# Patient Record
Sex: FEMALE | Race: WHITE | Employment: OTHER | URBAN - METROPOLITAN AREA
[De-identification: names, ages, dates, MRNs, and addresses within clinical notes are randomized per-mention and may not be internally consistent; named-entity substitution may affect disease eponyms.]

---

## 2017-01-18 ENCOUNTER — HOSPITAL ENCOUNTER (OUTPATIENT)
Dept: PHYSICAL THERAPY | Age: 82
Discharge: HOME | End: 2017-01-18
Payer: MEDICARE

## 2017-01-18 PROCEDURE — G8979 MOBILITY GOAL STATUS: HCPCS

## 2017-01-18 PROCEDURE — G8978 MOBILITY CURRENT STATUS: HCPCS

## 2017-01-18 PROCEDURE — 97163 PT EVAL HIGH COMPLEX 45 MIN: CPT

## 2017-01-18 NOTE — PROGRESS NOTES
[facility]  [facility], [address]  Ph [phone]  Fx [phone]    Plan of Care/ Statement of Necessity for Physical Therapy Services    Patient name: David Hancock Start of Care: 2017   Referral source: Homero Germain MD : 1925    Medical Diagnosis: Weakness [R53.1], balance problems Onset Date: 2016   Treatment Diagnosis:  Significant fall risk, possible vestibular problems, generalized weakness, gait instability   Prior Hospitalization: see medical history Provider#: [id number]   Medications: Verified on Patient summary List    Comorbidities: UTI, HTN, visual impairment, hearing impairment, fall risk   Prior Level of Function: ambulating with rollator, pt requires assistance with ADLs except bathing and dressing       The Plan of Care and following information is based on the information from the initial evaluation. Assessment/ key information:    Pt's  reported that pt was hospitalized in 2016 for 4 days then entered rehab facility for 1 month after UTI. Pt reports weakness and unsteady gait, but also reports dizziness and vertigo-like symptoms. During exam today, pt displayed significant fall risk using rollator (Functional Gait Assessment (FGA): 10/30); strength of LEs grossly 4-/5 to 5/5 bilateral LEs. Pt is able to transfer and mobilize on treatment table independently. Pt has reported vertigo-like symptoms in the past several weeks and also reported mild vertigo during head turns while performing FGA. Pt would benefit from a PT assessment with a vestibular trained physical therapist to assess and treat if necessary. Please indicate below order for vestibular evaluation and treatment if you agree and pt will be transferred to the [address] [place] [address] clinic to be evaluated and receive treatment.     Evaluation Complexity History HIGH Complexity :3+ comorbidities / personal factors will impact the outcome/ POC ; Examination MEDIUM Complexity : 3 Standardized tests and measures addressing body structure, function, activity limitation and / or participation in recreation  ;Presentation MEDIUM Complexity : Evolving with changing characteristics  ; Clinical Decision Making Other outcome measures FGA  HIGH   Overall Complexity Rating: HIGH   Problem List: decrease strength, impaired gait/ balance, decrease ADL/ functional abilitiies, decrease activity tolerance, decrease flexibility/ joint mobility and decrease transfer abilities   Treatment Plan may include any combination of the following: Therapeutic exercise, Therapeutic activities, Neuromuscular re-education, Gait/balance training and Other: vestibular evaluation and treatment  Patient / Family readiness to learn indicated by: asking questions, trying to perform skills and interest  Persons(s) to be included in education: patient (P) and family support person (FSP);list   Barriers to Learning/Limitations: None  Patient Goal (s): \"I want to be able to walk and function without fear of falling. \"  Patient Self Reported Health Status: good  Rehabilitation Potential: good    hort Term Goals (To be achieved in 4 weeks)   1) Increase strength of bilateral LEs by 1/3 grade in order to improve ability to transfer from various surfaces to standing without assistance. Status at initial evaluation: strength of LEs grossly 4-/5 to 5/5 bilateral LEs   2) Pt will be independent and compliant with HEP to achieve other goals. Status at initial evaluation: pt is not independent with exercises. 3) Pt will undergo PT vestibular evaluation in order to determine need for vestibular treatment and vestibular treatments will be initiated based on clinical findings and assessment. Status at initial evaluation: awaiting doctor's order for evaluation and treatment.      Long Term Goals (To be achieved in 8 weeks)   1) Pt will be able to demonstrate ability to ambulate with least restrictive assistive device x 200 feet with SBA or better in order to mobilize more independently. Status at initial evaluation: pt ambulating with rollator with SBA   2) Pt will increase strength of bilateral LEs to 4+/5 or better in order to normalize function, climb stairs, and allow pt to independently transfer from various surfaces to standing without assistance. Status at initial evaluation: strength of LEs grossly 4-/5 to 5/5 bilateral LEs   3) Pt will be able to independently climb stairs x14 steps with reciprocal gait pattern with 1-2 hand rails without safety concerns in order to normalize mobility. Status at initial evaluation: pt ambulating with inconsistent reciprocal stepping pattern using 2 hand rails    4) FGA score will increase 4 points in order to indicate significant improvement in dynamic balance and decrease risk of falls. Status at initial evaluation: FGA: 10/30    Frequency / Duration: Patient to be seen 2 times per week for 8 weeks. Patient/ Caregiver education and instruction: Diagnosis, prognosis, self care and activity modification, plan of care   [x]  Plan of care has been reviewed with PTA    G-Codes (GP)  Mobility   Current  CL= 60-79%   Goal  CK= 40-59%  The severity rating is based on clinical judgment and the FGA score. Certification Period: 1/18/17 to 3/18/17  James He PT 1/18/2017 10:43 AM    ________________________________________________________________________    I certify that the above Therapy Services are being furnished while the patient is under my care. I agree with the treatment plan and certify that this therapy is necessary.     Physician's Signature:____________________  Date:____________Time: _________    Please sign and return to [facility]  2 Hasmukh Baez [address], [address] [facility]  Ph [phone]  Fx [phone]

## 2017-01-18 NOTE — PROGRESS NOTES
PT DAILY TREATMENT NOTE - [facility]     Patient Name: Nissa Escalante  Date:2017  : 1925  [x]  Patient  Verified  Payor: [place] MEDICARE / Plan: [place] MEDICARE PART A & B / Product Type: Medicare /    In time:10:20  Out time:11:12  Total Treatment Time (min): 52  Total Timed Codes (min): 0  1:1 Treatment Time ( [address] only): 46   Visit #: 1 of 16    Treatment Area: Weakness [R53.1]    SUBJECTIVE  Pain Level (0-10 scale): 0/10  Any medication changes, allergies to medications, adverse drug reactions, diagnosis change, or new procedure performed?: [x] No    [] Yes (see summary sheet for update)  Subjective functional status/changes:   [] No changes reported  1 year ago vertigo-like symptoms, unsteadiness; 2016 hospitalized for 4 days due to UTI then in rehab facility for 4 weeks   Denies any recent falls, but pt very cautious, denies LEs buckling  Hx: history of UTIs   Job: sedentary lifestyle due to imbalance,  drives, pt dresses and bathes self, but other ADLs provided by . Goals: \"I want to be able to walk and function without fear of falling. \"       OBJECTIVE    Modality rationale:    Min Type Additional Details    [] Estim:  []Unatt       []IFC  []Premod                        []Other:  []w/ice   []w/heat  Position:  Location:    [] Estim: []Att    []TENS instruct  []NMES                    []Other:  []w/US   []w/ice   []w/heat  Position:  Location:    []  Traction: [] Cervical       []Lumbar                       [] Prone          []Supine                       []Intermittent   []Continuous Lbs:  [] before manual  [] after manual    []  Ultrasound: []Continuous   [] Pulsed                           []1MHz   []3MHz W/cm2:  Location:    []  Iontophoresis with dexamethasone         Location: [] Take home patch   [] In clinic    []  Ice     []  heat  []  Ice massage  []  Laser   []  Anodyne Position:  Location:    []  Laser with stim  []  Other:  Position:  Location:    []  Vasopneumatic Device Pressure:       [] lo [] med [] hi   Temperature: [] lo [] med [] hi   [] Skin assessment post-treatment:  []intact []redness- no adverse reaction    []redness  adverse reaction:     52 min [x]Eval                  []Re-Eval        min Therapeutic Exercise:  [] See flow sheet :        min Therapeutic Activity:  []  See flow sheet :         min Neuromuscular Re-education:  []  See flow sheet :        min Manual Therapy:          min Gait Training:  ___ feet with ___ device on level surfaces with ___ level of assist   Rationale: With   [] TE   [] TA   [] neuro   [] other: Patient Education: [x] Review HEP    [] Progressed/Changed HEP based on:   [] positioning   [] body mechanics   [] transfers   [] heat/ice application    [] other:      Other Objective/Functional Measures:   See evaluation and plan of care. Pain Level (0-10 scale) post treatment: 0/10    ASSESSMENT/Changes in Function:    Pt's  reported that pt was hospitalized in November 2016 for 4 days then entered rehab facility for 1 month after UTI. Pt reports weakness and unsteady gait, but also reports dizziness and vertigo-like symptoms. During exam today, pt displayed significant fall risk using rollator (Functional Gait Assessment (FGA): 10/30); strength of LEs grossly 4-/5 to 5/5 bilateral LEs. Pt is able to transfer and mobilize on treatment table independently. Pt has reported vertigo-like symptoms in the past several weeks and also reported mild vertigo during head turns while performing FGA. Pt would benefit from a PT assessment with a vestibular trained physical therapist to assess and treat if necessary. Please indicate below order for vestibular evaluation and treatment if you agree and pt will be transferred to the [address] [place] [address] clinic to be evaluated and receive treatment.     Patient will continue to benefit from skilled PT services to modify and progress therapeutic interventions, address functional mobility deficits, address strength deficits, analyze and cue movement patterns, analyze and modify body mechanics/ergonomics, assess and modify postural abnormalities, address imbalance/dizziness and instruct in home and community integration to attain remaining goals. []  See Plan of Care  []  See progress note/recertification  []  See Discharge Summary         Progress towards goals / Updated goals:  Short Term Goals (To be achieved in 4 weeks)   1) Increase strength of bilateral LEs by 1/3 grade in order to improve ability to transfer from various surfaces to standing without assistance. Status at initial evaluation: strength of LEs grossly 4-/5 to 5/5 bilateral LEs  Current status: not reassessed     2) Pt will be independent and compliant with HEP to achieve other goals. Status at initial evaluation: pt is not independent with exercises. Current status: not reassessed     3) Pt will undergo PT vestibular evaluation in order to determine need for vestibular treatment and vestibular treatments will be initiated based on clinical findings and assessment. Status at initial evaluation: awaiting doctor's order for evaluation and treatment. Current status: not reassessed     Long Term Goals (To be achieved in 8 weeks)   1) Pt will be able to demonstrate ability to ambulate with least restrictive assistive device x 200 feet with SBA or better in order to mobilize more independently. Status at initial evaluation: pt ambulating with rollator with SBA  Current status: not reassessed     2) Pt will increase strength of bilateral LEs to 4+/5 or better in order to normalize function, climb stairs, and allow pt to independently transfer from various surfaces to standing without assistance.   Status at initial evaluation: strength of LEs grossly 4-/5 to 5/5 bilateral LEs  Current status: not reassessed     3) Pt will be able to independently climb stairs x14 steps with reciprocal gait pattern with 1-2 hand rails without safety concerns in order to normalize mobility. Status at initial evaluation: pt ambulating with inconsistent reciprocal stepping pattern using 2 hand rails   Current status: not reassessed     4) FGA score will increase 4 points in order to indicate significant improvement in dynamic balance and decrease risk of falls. Status at initial evaluation: FGA: 10/30  Current status: not reassessed    PLAN  []  Upgrade activities as tolerated     []  Continue plan of care  []  Update interventions per flow sheet       []  Discharge due to:_  [x]  Other: recommending vestibular evaluation and treatment, LE strengthening/conditioning, balance/gait training     Cash Motta, PT 1/18/2017  11:13 AM    No future appointments.

## 2017-01-18 NOTE — PROGRESS NOTES
Physical Therapy Evaluation  Neurologic  1 year ago vertigo-like symptoms, unsteadiness,   Denies any recent falls, but pt very cautious, denies LEs buckling  Hx: history of UTIs,   Job: sedentary lifestyle due to imbalance,  drives, pt dresses and bathes self, but other ADLs provided by . Goals: \"I want to be able to walk and function without fear of falling. \"    Posture: [] Poor    [] Fair    [] Good    Describe:       Gait: [] Normal    [] Abnormal    Device:      Describe:    ROM:                             AROM    PROM   Shoulder Left Right Left Right   Flex       Ext       ABD       ER       IR                AROM    PROM   Knee Left Right Left Right   Ext       Flex                 AROM                           PROM  Hip Left Right Left Right   Flex       Ext       ABD       ER       IR                                                AROM      PROM   Ankle Left Right Left Right   Ext       Flex         Strength (MMT):  Shoulder L (1-5) R (1-5)   Shoulder Flexion     Shoulder Ext     Shoulder ABD     Shoulder ADD     Shoulder IR     Shoulder ER                                               Hip L (1-5) R (1-5)   Hip Flexion 4- 4-   Hip Ext 5 5   Hip ABD 4 4   Hip ADD 5 5   Hip ER 4 4   Hip IR 4 4-     Knee L (1-5) R (1-5)   Knee Flexion 4 4-   Knee Extension 4+ 4+   Ankle PF     Ankle DF 5 5   Other       Tone:    Motor Control:    Sensation:    Reflexes: [] Not Tested   Left Right   Biceps (C5)     Brachioradiais (C6)     Triceps (C7)     Knee Jerk (L4)     Ankle Jerk (SI)       Balance/ Equilibrium:              Left            Right  Tracks Across Midline      Reaches Across Midline           Sitting Balance: Static:  [] Good    [] Fair    [] Poor     Dynamic:   [] Good    [] Fair    [] Poor        Standing Balance: Static:   [] Good    [] Fair    [] Poor     Dynamic:   [] Good    [] Fair    [] Poor        Protective Extension:  [] Present    [] Delayed    [] Absent        Single Leg Stance: Eyes Open  Eyes Closed   L  L    R  R        Functional Mobility      Bed Mobility:      Scooting:        Rolling:       Sit-Supine:      Transfers:       Sit-Stand:       Floor-Stand:       Gait:       Tandem:       Backwards:       Braiding:      Elevations:       Curbs:      Ramps:      Stairs:    Behavior: [] Cooperative    [] Impulsive    [] Agitated    [] Perseverative    [] Confused   Oriented x:    Cognition: [] One Step Commands   [] Multiple Commands   [] Displays Neglect [] R  [] L    Other:       Impaired Judgement: [] Y    [] N      Impaired Vision:  [] Y    [] N      Safety Awareness Deficits  [] Y    [] N      Impaired Hearing  [] Y    [] N      Able to Express Needs [] Y    [] N    Optional Tests:       Dynamic Gait Index (24pt scale): Functional Gait Assessment (30pt scale): 10/30       Pryor Balance Scale (56pt scale):     Other test /comments:

## 2017-01-24 ENCOUNTER — HOSPITAL ENCOUNTER (OUTPATIENT)
Dept: PHYSICAL THERAPY | Age: 82
Discharge: HOME | End: 2017-01-24
Payer: MEDICARE

## 2017-01-24 PROCEDURE — 97530 THERAPEUTIC ACTIVITIES: CPT

## 2017-01-24 PROCEDURE — 97110 THERAPEUTIC EXERCISES: CPT

## 2017-01-24 NOTE — PROGRESS NOTES
PT DAILY TREATMENT NOTE/VESTIBULAR EVAL 3-16    Patient Name: Nissa Escalante  Date:2017  : 1925  [x]  Patient  Verified  Payor: [place] MEDICARE / Plan: VA MEDICARE PART A & B / Product Type: Medicare /    In time:935  Out time:1030  Total Treatment Time (min): 55  Total Timed Codes (min): 55  1:1 Treatment Time ([facility] only): 54   Visit #: 2 of 16    Treatment Area: Dizziness [R42]  Weakness [R53.1]    SUBJECTIVE  Pain Level (0-10 scale): 2 CS  []constant [x]intermittent []improving []worsening []no change since onset    Any medication changes, allergies to medications, adverse drug reactions, diagnosis change, or new procedure performed?: [x] No    [] Yes (see summary sheet for update)  Subjective functional status/changes: hospitalization due to UTI, followed by rehab for 4 weeks and HH PT. Always spend the smith up in [place]. Ambulatory with RW for about 2 years. Fall over the last 2 months. Reports dizziness with head movements, getting up in AM. Has had symptoms for many years. Never been treated for dizziness. Uncertain of steps taking during episodes of dizziness. Uses night light . Some pain with neck movements.         OBJECTIVE/EXAMINATION              30 min Reevaluation         min Therapeutic Exercise:  [] See flow sheet :       25 min Therapeutic Activity:  [x]  See flow sheet :instruction in HEP and POC,balance activities    Rationale: increase ROM, increase strength and improve balance  to improve ambulatory balance      min Neuromuscular Re-education:  []  See flow sheet :                 With   [] TE   [x] TA   [] neuro   [] other: Patient Education: [x] Review HEP    [] Progressed/Changed HEP based on:   [] positioning   [] body mechanics   [] transfers   [] heat/ice application    [] other:      Other Objective/Functional Measures: as per evaluation    Physical Therapy Evaluation - Vestibular    Posture:  [] WNL      [x] Forward head    [x] Protracted shoulders    [] Retracted shoulders  [] Kyphosis:  [] increased   [] decreased   [] Lordosis:   [] increased   [] decreased  Other:    C/S ROM: [] WFL    [x] Limited    Describe:SB 10, Ext 20, Flex 30, rot both 30 deg, increased pain with right rot. Pain with all AROM    Strength: [] WFL    [x] Limited    Describe:gross UE strength 4/5 MMT    Optional Tests:  Sensation:  [x] Intact [x] Diminished    Describe:occasional numbness in both hands due to cold    Proprioception: [x] Intact [] Diminished    Describe:    Coordination Testing:       Disdiadochokinesia [x] WFL    [] Impaired    Describe:       Heel - Apple  [] [facility]    [] Impaired    Describe:       FNF   [] WFL    [] Impaired    Describe: Toe Tap   [x] [facility]    [] Impaired    Describe:    Oculomotor Tests: (Fixation Not Blocked)       Ocular ROM:   [x] WFL    [] Limited    Describe:       Spontaneous Nystag. [x] Neg     [] Pos    [] Left    [] Right       Gaze Holding Nystag. [x] Neg     [] Pos    [] Left    [] Right        Smooth Pursuit  [x] Neg     [] Pos    [] Left    [] Right        Saccades   [x] Neg     [] Pos    [] Left    [] Right        VOR - Slow Head Mvmt [x] Neg     [] Pos    [] Left    [] Right        VOR - Fast Head Mvmt [x] Neg     [] Pos    [] Left    [] Right        Head Thrust  [x] Neg     [] Pos    [] Left    [] Right        Static Visual Acuity [x] Neg     [] Pos    [] Left    [] Right        Dynamic Visual Acuity [x] Neg     [] Pos    [] Left    [] Right     Oculomotor Tests: (Fixation Blocked)       Spontaneous Nystag. [x] Neg     [] Pos    [] Left    [] Right       Gaze Holding Nystag. [x] Neg     [] Pos    [] Left    [] Right        Head-Shaking Nystag.  [x] Neg     [] Pos    [] Left    [] Right    Other Special Tests:       Vertebral Artery Testing [] Neg     [] Pos    [] Left    [] Right       Hallpike-Street Maneuver [x] Neg     [] Pos    [x] Left    [x] Right       Roll Test   [] Neg     [] Pos    [] Left    [] Right       Pryor Balance Scale [] Neg     [] Pos Score:       Dynamic Gait Index [] Neg     [] Pos    Score:       Functional Gait Assess. [] Neg     [x] Pos    Score:10/30    Balance Standard Testing (Eyes Open/Eyes Closed - EO/EC)       Romberg   [x] [facility]    [] Pos    Describe: Increased sway with EC but able to maintain standing for 10 seconds, feet together/EC for max 3 seconds, uneasy with test and foot position          Stand on Foam  [] WFL    [] Pos    Describe:        Standing on Rail  [] WFL    [x] Pos    Describe: Unable to perform       Sharpened Romberg [] WFL    [x] Pos    Describe: Needs assist to maintain position with EO       Single Leg Stand  [] [facility]    [x] Pos    Describe:unable to perform > 1 second     Motion Sensitivity:  [] Neg     [] Pos    Describe:    Computerized Dynamic Posturography:        [x] Not Tested    [] WFL    Score: Other Tests:         Pain Level (0-10 scale) post treatment: 0    ASSESSMENT/Changes in Function: understanding of POC    Patient will continue to benefit from skilled PT services to address ROM deficits, address strength deficits, analyze and cue movement patterns and address imbalance/dizziness to attain remaining goals. []  See Plan of Care  []  See progress note/recertification  []  See Discharge Summary         Progress towards goals / Updated goals:  Good tolerance to activities, no reports of dizziness     hort Term Goals (To be achieved in 4 weeks)  1) Increase strength of bilateral LEs by 1/3 grade in order to improve ability to transfer from various surfaces to standing without assistance. Status at initial evaluation: strength of LEs grossly 4-/5 to 5/5 bilateral LEs  2) Pt will be independent and compliant with HEP to achieve other goals. Status at initial evaluation: pt is not independent with exercises.   3)Improved DHI score to ,or= 30 points as evidence of improved dizziness with ADL  Status at [facility]: [address] [place] [address] Term Goals (To be achieved in 8 weeks)  1) Pt will be able to demonstrate ability to ambulate with least restrictive assistive device x 200 feet with SBA or better in order to mobilize more independently. Status at initial evaluation: pt ambulating with rollator with SBA  2) Pt will increase strength of bilateral LEs to 4+/5 or better in order to normalize function, climb stairs, and allow pt to independently transfer from various surfaces to standing without assistance. Status at initial evaluation: strength of LEs grossly 4-/5 to 5/5 bilateral LEs  3) Pt will be able to independently climb stairs x14 steps with reciprocal gait pattern with 1-2 hand rails without safety concerns in order to normalize mobility. Status at initial evaluation: pt ambulating with inconsistent reciprocal stepping pattern using 2 hand rails   4) FGA score will increase 4 points in order to indicate significant improvement in dynamic balance and decrease risk of falls.   Status at initial evaluation: FGA: 10/30     PLAN  []  Upgrade activities as tolerated     [x]  Continue plan of care  []  Update interventions per flow sheet       []  Discharge due to:_  []  Other:_      Shweta Bush PT 1/24/2017  9:35 AM

## 2017-01-26 ENCOUNTER — HOSPITAL ENCOUNTER (OUTPATIENT)
Dept: PHYSICAL THERAPY | Age: 82
Discharge: HOME | End: 2017-01-26
Payer: MEDICARE

## 2017-01-26 PROCEDURE — 97110 THERAPEUTIC EXERCISES: CPT

## 2017-01-26 PROCEDURE — 97530 THERAPEUTIC ACTIVITIES: CPT

## 2017-01-26 PROCEDURE — 97140 MANUAL THERAPY 1/> REGIONS: CPT

## 2017-01-26 NOTE — PROGRESS NOTES
PT DAILY TREATMENT NOTE - [facility]     Patient Name: Evaristo Young  Date:2017  : 1925  [x]  Patient  Verified  Payor: [place] MEDICARE / Plan: [place] MEDICARE PART A & B / Product Type: Medicare /    In time:5  Out time:11  Total Treatment Time (min): 55  Total Timed Codes (min): 45  1:1 Treatment Time ( [address] only): 39   Visit #: 3 of 16    Treatment Area: Dizziness [R42]  Weakness [R53.1]    SUBJECTIVE  Pain Level (0-10 scale): 0  Any medication changes, allergies to medications, adverse drug reactions, diagnosis change, or new procedure performed?: [x] No    [] Yes (see summary sheet for update)  Subjective functional status/changes:   [x] No changes reported  Some neck pain with movements, dizziness with HEP    OBJECTIVE    Modality rationale: Pain control   Min Type Additional Details    [] Estim:  []Unatt       []IFC  []Premod                        []Other:  []w/ice   []w/heat  Position:  Location:    [] Estim: []Att    []TENS instruct  []NMES                    []Other:  []w/US   []w/ice   []w/heat  Position:  Location:    []  Traction: [] Cervical       []Lumbar                       [] Prone          []Supine                       []Intermittent   []Continuous Lbs:  [] before manual  [] after manual    []  Ultrasound: []Continuous   [] Pulsed                           []1MHz   []3MHz W/cm2:  Location:    []  Iontophoresis with dexamethasone         Location: [] Take home patch   [] In clinic   10 []  Ice     [x]  heat  []  Ice massage  []  Laser   []  Anodyne Position:supine 90/90  Location:CS    []  Laser with stim  []  Other:  Position:  Location:    []  Vasopneumatic Device Pressure:       [] lo [] med [] hi   Temperature: [] lo [] med [] hi   [] Skin assessment post-treatment:  []intact []redness- no adverse reaction    []redness  adverse reaction:      min []Eval                  []Re-Eval       15 min Therapeutic Exercise:  [x] See flow sheet :   Rationale: increase ROM, increase strength and improve balance to improve the patients ability to return to PLOF    15 min Therapeutic Activity:  [x]  See flow sheet :   Rationale: increase ROM, increase strength and improve balance  to improve the patients ability to return to PLOF      min Neuromuscular Re-education:  []  See flow sheet :   Rationale:   to improve the patients ability to     15 min Manual Therapy:  Functional massage and gentle facet gliding to CS in supine for increased mobility and ease to perform vestibular ex   Rationale: decrease pain, increase ROM, increase tissue extensibility, correct positional vertigo and increase postural awareness      min Gait Training:  ___ feet with ___ device on level surfaces with ___ level of assist   Rationale: With   [] TE   [] TA   [] neuro   [] other: Patient Education: [x] Review HEP    [] Progressed/Changed HEP based on:   [] positioning   [] body mechanics   [] transfers   [] heat/ice application    [] other:      Other Objective/Functional Measures: marked stiffness and limited mobility ,in CS,   Deficit in gaze stability, eyes drifting occasionally with VSE ex         Pain Level (0-10 scale) post treatment: 0    ASSESSMENT/Changes in Function: good tolerance to MT    Patient will continue to benefit from skilled PT services to address functional mobility deficits, address ROM deficits, address strength deficits, analyze and address soft tissue restrictions, analyze and cue movement patterns and analyze and modify body mechanics/ergonomics to attain remaining goals.      [x]  See Plan of Care  []  See progress note/recertification  []  See Discharge Summary         Progress towards goals / Updated goals:  Reduction in pain    PLAN  []  Upgrade activities as tolerated     [x]  Continue plan of care  []  Update interventions per flow sheet       []  Discharge due to:_  []  Other:_      Joy Adams, PT 1/26/2017  10:07 AM    Future Appointments  Date Time Provider Department [place]   1/31/2017 9:00 AM ASHU Alvarado THE [facility]   2/2/2017 9:30 AM ASHU Alvarado THE [facility]

## 2017-01-31 ENCOUNTER — APPOINTMENT (OUTPATIENT)
Dept: PHYSICAL THERAPY | Age: 82
End: 2017-01-31
Payer: MEDICARE

## 2017-02-01 ENCOUNTER — HOSPITAL ENCOUNTER (OUTPATIENT)
Dept: PHYSICAL THERAPY | Age: 82
Discharge: HOME | End: 2017-02-01
Payer: MEDICARE

## 2017-02-01 PROCEDURE — 97530 THERAPEUTIC ACTIVITIES: CPT

## 2017-02-01 NOTE — PROGRESS NOTES
PT DAILY TREATMENT NOTE - [facility]     Patient Name: Josh Urban  Date:2017  : 1925  [x]  Patient  Verified  Payor: [place] MEDICARE / Plan: [place] MEDICARE PART A & B / Product Type: Medicare /    In time:200  Out time:245  Total Treatment Time (min): 45  Total Timed Codes (min): 45  1:1 Treatment Time ( [address] only): 39   Visit #: 4 of 16    Treatment Area: Dizziness [R42]  Weakness [R53.1]    SUBJECTIVE  Pain Level (0-10 scale): 0  Any medication changes, allergies to medications, adverse drug reactions, diagnosis change, or new procedure performed?: [x] No    [] Yes (see summary sheet for update)  Subjective functional status/changes:   [] No changes reported  Reports some improvement with strength, has been active with vestibular eye exercises. OBJECTIVE    45 min Therapeutic Activity:  [x]  See flow sheet : standing pertubation's in all directions in standing    Rationale: increase ROM, increase strength and improve coordination            With   [] TE   [] TA   [] neuro   [] other: Patient Education: [x] Review HEP    [] Progressed/Changed HEP based on:   [] positioning   [] body mechanics   [] transfers   [] heat/ice application    [] other:      Other Objective/Functional Measures:    Trouble focusing on object with vertical head movements       Pain Level (0-10 scale) post treatment: 0    ASSESSMENT/Changes in Function:   Some difficulty change direction against pertubation's, required mod verbal cueing to shift weight in proper direction. Patient will continue to benefit from skilled PT services to modify and progress therapeutic interventions, address functional mobility deficits, address ROM deficits and address strength deficits to attain remaining goals.      [x]  See Plan of Care  []  See progress note/recertification  []  See Discharge Summary         Progress towards goals / Updated goals:  short Term Goals (To be achieved in 4 weeks)  1) Increase strength of bilateral LEs by 1/3 grade in order to improve ability to transfer from various surfaces to standing without assistance. Status at initial evaluation: strength of LEs grossly 4-/5 to 5/5 bilateral LEs  2) Pt will be independent and compliant with HEP to achieve other goals. Status at initial evaluation: pt is not independent with exercises. 3)Improved DHI score to ,or= 30 points as evidence of improved dizziness with ADL  Status at [facility]: [facility]  Long Term Goals (To be achieved in 8 weeks)  1) Pt will be able to demonstrate ability to ambulate with least restrictive assistive device x 200 feet with SBA or better in order to mobilize more independently. Status at initial evaluation: pt ambulating with rollator with SBA  2) Pt will increase strength of bilateral LEs to 4+/5 or better in order to normalize function, climb stairs, and allow pt to independently transfer from various surfaces to standing without assistance. Status at initial evaluation: strength of LEs grossly 4-/5 to 5/5 bilateral LEs  3) Pt will be able to independently climb stairs x14 steps with reciprocal gait pattern with 1-2 hand rails without safety concerns in order to normalize mobility. Status at initial evaluation: pt ambulating with inconsistent reciprocal stepping pattern using 2 hand rails   4) FGA score will increase 4 points in order to indicate significant improvement in dynamic balance and decrease risk of falls.   Status at initial evaluation: FGA: 10/30       PLAN  [x]  Upgrade activities as tolerated     [x]  Continue plan of care  []  Update interventions per flow sheet       []  Discharge due to:_  []  Other:_      Ulises [place] PTA 2/1/2017  2:55 PM    Future Appointments  Date Time Provider [place]   2/2/2017 9:30 AM ASHU Velazquez THE [facility]   2/6/2017 10:00 AM ASHU Velazquez [facility]   2/9/2017 11:00 AM DARYL Quispe THE [facility]

## 2017-02-02 ENCOUNTER — HOSPITAL ENCOUNTER (OUTPATIENT)
Dept: PHYSICAL THERAPY | Age: 82
Discharge: HOME | End: 2017-02-02
Payer: MEDICARE

## 2017-02-02 PROCEDURE — 97110 THERAPEUTIC EXERCISES: CPT

## 2017-02-02 PROCEDURE — 97116 GAIT TRAINING THERAPY: CPT

## 2017-02-02 NOTE — PROGRESS NOTES
PT DAILY TREATMENT NOTE - [facility]     Patient Name: Dario Shoemaker  Date:2017  : 1925  [x]  Patient  Verified  Payor: [place] MEDICARE / Plan: [place] MEDICARE PART A & B / Product Type: Medicare /    In time:945  Out time:1030  Total Treatment Time (min): 45  Total Timed Codes (min): 45  1:1 Treatment Time ([address] only): 39   Visit #: 5 of 16    Treatment Area: Dizziness [R42]  Weakness [R53.1]    SUBJECTIVE  Pain Level (0-10 scale): 0  Any medication changes, allergies to medications, adverse drug reactions, diagnosis change, or new procedure performed?: [x] No    [] Yes (see summary sheet for update)  Subjective functional status/changes:     [x] No changes reported      OBJECTIVE        35 min Therapeutic Exercise:  [] See flow sheet :   Rationale: increase ROM, increase strength and improve coordination    10 min Gait Training: focus on Weight shifting and equal stride length with SPC   Rationale: With   [x] TE   [] TA   [] neuro   [] other: Patient Education: [x] Review HEP  EC arms crossed balance  [] Progressed/Changed HEP based on:   [] positioning   [] body mechanics   [] transfers   [] heat/ice application    [] other:      Other Objective/Functional Measures:   Difficulty weight shifting while using SPC, improved with tactile cuing     Pain Level (0-10 scale) post treatment: 0    ASSESSMENT/Changes in Function:   Go understanding of HEP upon review. Min LOB with TE requiring external support. Improved gait pattern with spc with tactile and verbal cuing    Patient will continue to benefit from skilled PT services to modify and progress therapeutic interventions, address functional mobility deficits, address ROM deficits, address strength deficits and analyze and address soft tissue restrictions to attain remaining goals.      [x]  See Plan of Care  []  See progress note/recertification  []  See Discharge Summary         Progress towards goals / Updated goals:  short Term Goals (To be achieved in 4 weeks)  1) Increase strength of bilateral LEs by 1/3 grade in order to improve ability to transfer from various surfaces to standing without assistance. Status at initial evaluation: strength of LEs grossly 4-/5 to 5/5 bilateral LEs    2) Pt will be independent and compliant with HEP to achieve other goals. Status at initial evaluation: pt is not independent with exercises. 3)Improved DHI score to ,or= 30 points as evidence of improved dizziness with ADL  Status at [facility]: [facility]  Long Term Goals (To be achieved in 8 weeks)  1) Pt will be able to demonstrate ability to ambulate with least restrictive assistive device x 200 feet with SBA or better in order to mobilize more independently. Status at initial evaluation: pt ambulating with rollator with SBA    2) Pt will increase strength of bilateral LEs to 4+/5 or better in order to normalize function, climb stairs, and allow pt to independently transfer from various surfaces to standing without assistance. Status at initial evaluation: strength of LEs grossly 4-/5 to 5/5 bilateral LEs    3) Pt will be able to independently climb stairs x14 steps with reciprocal gait pattern with 1-2 hand rails without safety concerns in order to normalize mobility. Status at initial evaluation: pt ambulating with inconsistent reciprocal stepping pattern using 2 hand rails     4) FGA score will increase 4 points in order to indicate significant improvement in dynamic balance and decrease risk of falls.   Status at initial evaluation: FGA: 10/30          PLAN  [x]  Upgrade activities as tolerated     [x]  Continue plan of care  []  Update interventions per flow sheet       []  Discharge due to:_  []  Other:_      Roberth Mae PTA 2/2/2017  10:38 AM    Future Appointments  Date Time Provider [place]   2/6/2017 10:00 AM ASHU Muse THE [facility]   2/9/2017 11:00 AM DARYL Ortiz THE [facility]

## 2017-02-08 ENCOUNTER — APPOINTMENT (OUTPATIENT)
Dept: PHYSICAL THERAPY | Age: 82
End: 2017-02-08
Payer: MEDICARE

## 2017-02-09 ENCOUNTER — APPOINTMENT (OUTPATIENT)
Dept: PHYSICAL THERAPY | Age: 82
End: 2017-02-09
Payer: MEDICARE

## 2017-02-14 ENCOUNTER — APPOINTMENT (OUTPATIENT)
Dept: PHYSICAL THERAPY | Age: 82
End: 2017-02-14
Payer: MEDICARE

## 2017-02-17 ENCOUNTER — APPOINTMENT (OUTPATIENT)
Dept: PHYSICAL THERAPY | Age: 82
End: 2017-02-17
Payer: MEDICARE

## 2017-02-21 ENCOUNTER — HOSPITAL ENCOUNTER (OUTPATIENT)
Dept: PHYSICAL THERAPY | Age: 82
Discharge: HOME | End: 2017-02-21
Payer: MEDICARE

## 2017-02-21 PROCEDURE — G8979 MOBILITY GOAL STATUS: HCPCS

## 2017-02-21 PROCEDURE — 97530 THERAPEUTIC ACTIVITIES: CPT

## 2017-02-21 PROCEDURE — G8978 MOBILITY CURRENT STATUS: HCPCS

## 2017-02-21 PROCEDURE — 97140 MANUAL THERAPY 1/> REGIONS: CPT

## 2017-02-21 NOTE — PROGRESS NOTES
[facility] [address], [address] [place] [address]  Phone: [phone]      Fax:  [phone]    Medicare Progress Report      Patient name: Joan Tsai     Start of Care: 17  Referral source: Yolanda Bennett MD    : 1925  Medical/Treatment Diagnosis: Dizziness [R42]  Weakness [R53.1]  Onset Date:2016  Prior Hospitalization: see medical history   Provider#: [id number]  Comorbidities: UTI, HTN, visual impairment, hearing impairment, fall risk  Prior Level of Function:ambulation with rollator, requires assistance with ADLs except bathing and dressing  Medications: Verified on Patient Summary List    Visits from Start of Care: ambulating with rollator, requires assistance with ADLs except bathing and dressing    Missed Visits: 4  Reporting Period : 17 to 17    Subjective Reports: I am feeling stronger and my hip is not hurting as much any more                            Progress towards goals / Mary Kate Galeas Mrs. Jordon Will is showing slow progress in ambulatory balance, dizziness and general strength. She reports some improvement in subjective dizziness and ambulatory balance. Due to recent episode of acute hip pain she has been missing almost 2 weeks of therapy but she is ready to attend on a regular basis now since her hip pain has improved. She has met 1 of 7 goals. Recommend to continue with present treatment. Short Term Goals (To be achieved in 4 weeks)  1) Increase strength of bilateral LEs by 1/3 grade in order to improve ability to transfer from various surfaces to standing without assistance. Status at initial evaluation: strength of LEs grossly 4-/5 to 5/5 bilateral LEs  Current status: residual LE weakness (4-/5) and difficulty performing sit to stand without use of hands, progressing slowly    2) Pt will be independent and compliant with HEP to achieve other goals.   Status at initial evaluation: pt is not independent with exercises. Current status: daily compliance with HEP, goal met    3)Improved DHI score to ,or= 30 points as evidence of improved dizziness with ADL  Status at [facility]: [address] 56/100  Current status: reports 5% subjective improvement, DHI improved to 52 points, progressing slowly      Long Term Goals (To be achieved in 8 weeks)  1) Pt will be able to demonstrate ability to ambulate with least restrictive assistive device x 200 feet with SBA or better in order to mobilize more independently. Status at initial evaluation: pt ambulating with rollator with SBA  Current status: good endurance and ability to ambulate > 20 feet with CGA or rollator, reports fear of falling, unable to ambulate without assist, reports wall walking at home, progressing    2) Pt will increase strength of bilateral LEs to 4+/5 or better in order to normalize function, climb stairs, and allow pt to independently transfer from various surfaces to standing without assistance. Status at initial evaluation: strength of LEs grossly 4-/5 to 5/5 bilateral LEs  Current status: increased ease with ADL and ambulation, LE strength progressing slowly, gross strength 4-/5, progressing    3) Pt will be able to independently climb stairs x14 steps with reciprocal gait pattern with 1-2 hand rails without safety concerns in order to normalize mobility. Status at initial evaluation: pt ambulating with inconsistent reciprocal stepping pattern using 2 hand rails   Current status: able to perform stair ambulation/14 steps  with verbal cues with reciprocal gait pattern, using both HR, requires SBA for safety, progressing    4) FGA score will increase 4 points in order to indicate significant improvement in dynamic balance and decrease risk of falls.   Status at initial evaluation: FGA: 10/30  Current status: FGA unchanged, goal not met  Key functional changes: ambulatory balance      Problems/ barriers to goal attainment: weakness, chronic balance deficit     Assessment / Recommendations:continue with present treatment    Problem List: pain affecting function, decrease ROM, decrease strength, impaired gait/ balance, decrease ADL/ functional abilitiies, decrease activity tolerance, decrease flexibility/ joint mobility and decrease transfer abilities   Treatment Plan: Therapeutic exercise, Therapeutic activities, Neuromuscular re-education, Physical agent/modality, Gait/balance training, Manual therapy, Patient education and Other: vestibular ex        Frequency / Duration: Patient to be seen 2 times per week for 8 weeks as per initial POC:    G-Codes (GP)  Mobility  [id number] Current  CK= 40-59%   Goal  CJ= 20-39%  Position    Carry    Self Care      The severity rating is based on clinical judgement and the FOTO score.       Billie Kent, PT 2/21/2017 3:35 PM

## 2017-02-21 NOTE — PROGRESS NOTES
PT DAILY TREATMENT NOTE - [facility]     Patient Name: Tevin Moses  Date:2017  : 1925  [x]  Patient  Verified  Payor: [place] MEDICARE / Plan: VA MEDICARE PART A & B / Product Type: Medicare /    In time:11  Out time:1155  Total Treatment Time (min): 55  Total Timed Codes (min): 45  1:1 Treatment Time ( [address] only): 39   Visit #: 6 of 16    Treatment Area: Dizziness [R42]  Weakness [R53.1]    SUBJECTIVE  Pain Level (0-10 scale): 3 right hip  Any medication changes, allergies to medications, adverse drug reactions, diagnosis change, or new procedure performed?: [x] No    [] Yes (see summary sheet for update)  Subjective functional status/changes:     [] No changes reported  Patient reports that all of a sudden her right hip was hurting and she was barely able to get out of bed. Reports feeling better after taking prednisone. Last pill today. Mild residual pain in right buttock region. Due to hip pain she missed several visits. Ready to get back on track  Balance improving. Increased ease with ambulation. Using rollator at home and outside.       OBJECTIVE        30 min Therapeutic Activities:  [x] See flow sheet :reevaluation, static and dynamic balance activities, gait training   Rationale: increase ROM, increase strength and improve coordination    15 min Manual Therapy: including gentle CS distraction and suboccipital muscle release, gentle functional massage   Rationale:muscle relaxation and improved alignment to help with overall spinal alignment/posture during gait activities    10 min Moist Heat application to CS post therapy          With   [x] TE   [] TA   [] neuro   [] other: Patient Education: [x] Review HEP  EC arms crossed balance  [] Progressed/Changed HEP based on:   [] positioning   [] body mechanics   [] transfers   [] heat/ice application    [] other:      Other Objective/Functional Measures:   Needs HHA for ambulation without AD  Marked neck tightness and pain with AROM  Flexed posture with ambulation  FOTO 56/100 progressing  DHI improved from 56 to 52 points indicating minimal improvement in dizziness     Pain Level (0-10 scale) post treatment: 3 hip, 0 CS    ASSESSMENT/Changes in Function:   Patient motivated, mild pain in right hip after supine position    Patient will continue to benefit from skilled PT services to modify and progress therapeutic interventions, address functional mobility deficits, address ROM deficits, address strength deficits and analyze and address soft tissue restrictions to attain remaining goals. [x]  See Plan of Care  [x]  See progress note/recertification  []  See Discharge Summary         Progress towards goals / Roseanna Sanchez Mrs. Tobi Lakhani is showing slow progress in ambulatory balance, dizziness and general strength. She reports some improvement in subjective dizziness and ambulatory balance. Due to recent episode of acute hip pain she has been missing almost 2 weeks of therapy but she is ready to attend on a regular basis now since her hip pain has improved. She has met 1 of 7 goals. Recommend to continue with present treatment. Short Term Goals (To be achieved in 4 weeks)  1) Increase strength of bilateral LEs by 1/3 grade in order to improve ability to transfer from various surfaces to standing without assistance. Status at initial evaluation: strength of LEs grossly 4-/5 to 5/5 bilateral LEs  Current status: residual LE weakness (4-/5) and difficulty performing sit to stand without use of hands, progressing slowly    2) Pt will be independent and compliant with HEP to achieve other goals. Status at initial evaluation: pt is not independent with exercises.   Current status: daily compliance with HEP, goal met    3)Improved DHI score to ,or= 30 points as evidence of improved dizziness with ADL  Status at [facility]: [address] [place] [address] 56/100  Current status: reports 5% subjective improvement, DHI improved to 52 points, progressing slowly      Long Term Goals (To be achieved in 8 weeks)  1) Pt will be able to demonstrate ability to ambulate with least restrictive assistive device x 200 feet with SBA or better in order to mobilize more independently. Status at initial evaluation: pt ambulating with rollator with SBA  Current status: good endurance and ability to ambulate > 20 feet with CGA or rollator, reports fear of falling, unable to ambulate without assist, reports wall walking at home, progressing    2) Pt will increase strength of bilateral LEs to 4+/5 or better in order to normalize function, climb stairs, and allow pt to independently transfer from various surfaces to standing without assistance. Status at initial evaluation: strength of LEs grossly 4-/5 to 5/5 bilateral LEs  Current status: increased ease with ADL and ambulation, LE strength progressing slowly, gross strength 4-/5, progressing    3) Pt will be able to independently climb stairs x14 steps with reciprocal gait pattern with 1-2 hand rails without safety concerns in order to normalize mobility. Status at initial evaluation: pt ambulating with inconsistent reciprocal stepping pattern using 2 hand rails   Current status: able to perform stair ambulation/14 steps  with verbal cues with reciprocal gait pattern, using both HR, requires SBA for safety, progressing    4) FGA score will increase 4 points in order to indicate significant improvement in dynamic balance and decrease risk of falls. Status at initial evaluation: FGA: 10/30  Current status: FGA unchanged, goal not met          PLAN  [x]  Upgrade activities as tolerated     [x]  Continue plan of care with focus on general strength, postural alignment and ambulatory balance  []  Update interventions per flow sheet       []  Discharge due to:_  []  Other:_      Dylan Lazo, PT 2/21/2017  10:38 AM    No future appointments.

## 2017-02-23 ENCOUNTER — HOSPITAL ENCOUNTER (OUTPATIENT)
Dept: PHYSICAL THERAPY | Age: 82
Discharge: HOME | End: 2017-02-23
Payer: MEDICARE

## 2017-02-23 PROCEDURE — 97116 GAIT TRAINING THERAPY: CPT

## 2017-02-23 PROCEDURE — 97110 THERAPEUTIC EXERCISES: CPT

## 2017-02-23 NOTE — PROGRESS NOTES
PT DAILY TREATMENT NOTE - [facility]     Patient Name: Parul Romero  Date:2017  : 1925  [x]  Patient  Verified  Payor: [place] MEDICARE / Plan: [place] MEDICARE PART A & B / Product Type: Medicare /    In time:130  Out time:210  Total Treatment Time (min): 40  Total Timed Codes (min): 40  1:1 Treatment Time ([facility] only): 40   Visit #: 7 of 16    Treatment Area: Dizziness [R42]  Weakness [R53.1]    SUBJECTIVE  Pain Level (0-10 scale): 3hip 0 CS  Any medication changes, allergies to medications, adverse drug reactions, diagnosis change, or new procedure performed?: [x] No    [] Yes (see summary sheet for update)  Subjective functional status/changes:   [] No changes reported  Reports a decrease in hip pain. OBJECTIVE     35 min Therapeutic Exercise:  [] See flow sheet :   Rationale: increase ROM, increase strength, improve coordination and improve balance    10 min Gait:  Focus on stride and equal step length and keeping rollator close to person   Rationale: decrease pain, increase ROM and increase tissue extensibility        With   [] TE   [] TA   [] neuro   [] other: Patient Education: [x] Review HEP    [] Progressed/Changed HEP based on:   [] positioning   [] body mechanics   [] transfers   [] heat/ice application    [] other:      Other Objective/Functional Measures: Moderate cueing for proper gait with Rollator walker. Decreased step length with L LE  Tends to drag feet, flex trunk and reach arms       Pain Level (0-10 scale) post treatment: 0    ASSESSMENT/Changes in Function:   Good tolerance to TE today with decreased hip pain. Difficulty self correcting gait without cueing    Patient will continue to benefit from skilled PT services to modify and progress therapeutic interventions, address functional mobility deficits, address ROM deficits and address strength deficits to attain remaining goals.      [x]  See Plan of Care  []  See progress note/recertification  []  See Discharge Summary Progress towards goals / Updated goals:  Short Term Goals (To be achieved in 4 weeks)  1) Increase strength of bilateral LEs by 1/3 grade in order to improve ability to transfer from various surfaces to standing without assistance. Status at initial evaluation: strength of LEs grossly 4-/5 to 5/5 bilateral LEs  Current status: residual LE weakness (4-/5) and difficulty performing sit to stand without use of hands, progressing slowly     2) Pt will be independent and compliant with HEP to achieve other goals. Status at initial evaluation: pt is not independent with exercises. Current status: daily compliance with HEP, goal met     3)Improved DHI score to ,or= 30 points as evidence of improved dizziness with ADL  Status at [facility]: [address] [place] [address] 56/100  Current status: reports 5% subjective improvement, DHI improved to 52 points, progressing slowly      Long Term Goals (To be achieved in 8 weeks)  1) Pt will be able to demonstrate ability to ambulate with least restrictive assistive device x 200 feet with SBA or better in order to mobilize more independently. Status at initial evaluation: pt ambulating with rollator with SBA  Current status: good endurance and ability to ambulate > 20 feet with CGA or rollator, reports fear of falling, unable to ambulate without assist, reports wall walking at home, progressing     2) Pt will increase strength of bilateral LEs to 4+/5 or better in order to normalize function, climb stairs, and allow pt to independently transfer from various surfaces to standing without assistance. Status at initial evaluation: strength of LEs grossly 4-/5 to 5/5 bilateral LEs  Current status: increased ease with ADL and ambulation, LE strength progressing slowly, gross strength 4-/5, progressing     3) Pt will be able to independently climb stairs x14 steps with reciprocal gait pattern with 1-2 hand rails without safety concerns in order to normalize mobility.   Status at initial evaluation: pt ambulating with inconsistent reciprocal stepping pattern using 2 hand rails   Current status: able to perform stair ambulation/14 steps with verbal cues with reciprocal gait pattern, using both HR, requires SBA for safety, progressing     4) FGA score will increase 4 points in order to indicate significant improvement in dynamic balance and decrease risk of falls.   Status at initial evaluation: FGA: 10/30  Current status: FGA unchanged, goal not met    PLAN  [x]  Upgrade activities as tolerated     [x]  Continue plan of care  []  Update interventions per flow sheet       []  Discharge due to:_  []  Other:_      Jalen Chau PTA 2/23/2017  1:36 PM    Future Appointments  Date Time Provider Niraj Light   2/28/2017 1:00 PM Brenda COOPER [facility]   3/2/2017 2:30 PM Brenda COOPER THE [facility]

## 2017-02-27 ENCOUNTER — HOSPITAL ENCOUNTER (OUTPATIENT)
Dept: PHYSICAL THERAPY | Age: 82
Discharge: HOME | End: 2017-02-27
Payer: MEDICARE

## 2017-02-27 PROCEDURE — 97110 THERAPEUTIC EXERCISES: CPT

## 2017-02-27 NOTE — PROGRESS NOTES
PT DAILY TREATMENT NOTE - [facility]     Patient Name: Dario Shoemaker  Date:2017  : 1925  [x]  Patient  Verified  Payor: Eliza Zambrano / Plan: [place] MEDICARE PART A & B / Product Type: Medicare /    In time:105  Out time:150  Total Treatment Time (min): 45  Total Timed Codes (min): 35  1:1 Treatment Time ([address] only): 39   Visit #: 8 of 16    Treatment Area: Dizziness [R42]  Weakness [R53.1]    SUBJECTIVE  Pain Level (0-10 scale): 3  Any medication changes, allergies to medications, adverse drug reactions, diagnosis change, or new procedure performed?: [x] No    [] Yes (see summary sheet for update)  Subjective functional status/changes:   [] No changes reported  Hip pain persistent but is walking much better. OBJECTIVE      45 min Therapeutic Exercise:  [] See flow sheet :   Rationale: increase ROM, increase strength and improve coordination           With   [] TE   [] TA   [] neuro   [] other: Patient Education: [x] Review HEP    [] Progressed/Changed HEP based on:   [] positioning   [] body mechanics   [] transfers   [] heat/ice application    [] other:      Other Objective/Functional Measures:   none     Pain Level (0-10 scale) post treatment: 3    ASSESSMENT/Changes in Function:   No significant change in gait, requires moderate verbal cuing still in order to take equal step lengths and not to drag feet. Patient will continue to benefit from skilled PT services to modify and progress therapeutic interventions, address functional mobility deficits, address ROM deficits and address strength deficits to attain remaining goals. [x]  See Plan of Care  []  See progress note/recertification  []  See Discharge Summary         Progress towards goals / Updated goals:  Short Term Goals (To be achieved in 4 weeks)  1) Increase strength of bilateral LEs by 1/3 grade in order to improve ability to transfer from various surfaces to standing without assistance.   Status at initial evaluation: strength of LEs grossly 4-/5 to 5/5 bilateral LEs  Current status: residual LE weakness (4-/5) and difficulty performing sit to stand without use of hands, progressing slowly      2) Pt will be independent and compliant with HEP to achieve other goals. Status at initial evaluation: pt is not independent with exercises. Current status: daily compliance with HEP, goal met      3)Improved DHI score to ,or= 30 points as evidence of improved dizziness with ADL  Status at [facility]: [address] [place] [address] 56/100  Current status: reports 5% subjective improvement, DHI improved to 52 points, progressing slowly      Long Term Goals (To be achieved in 8 weeks)  1) Pt will be able to demonstrate ability to ambulate with least restrictive assistive device x 200 feet with SBA or better in order to mobilize more independently. Status at initial evaluation: pt ambulating with rollator with SBA  Current status: good endurance and ability to ambulate > 20 feet with CGA or rollator, reports fear of falling, unable to ambulate without assist, reports wall walking at home, progressing      2) Pt will increase strength of bilateral LEs to 4+/5 or better in order to normalize function, climb stairs, and allow pt to independently transfer from various surfaces to standing without assistance. Status at initial evaluation: strength of LEs grossly 4-/5 to 5/5 bilateral LEs  Current status: increased ease with ADL and ambulation, LE strength progressing slowly, gross strength 4-/5, progressing      3) Pt will be able to independently climb stairs x14 steps with reciprocal gait pattern with 1-2 hand rails without safety concerns in order to normalize mobility.   Status at initial evaluation: pt ambulating with inconsistent reciprocal stepping pattern using 2 hand rails   Current status: able to perform stair ambulation/14 steps with verbal cues with reciprocal gait pattern, using both HR, requires SBA for safety, progressing      4) FGA score will increase 4 points in order to indicate significant improvement in dynamic balance and decrease risk of falls.   Status at initial evaluation: FGA: 10/30  Current status: FGA unchanged, goal not met    PLAN  [x]  Upgrade activities as tolerated     [x]  Continue plan of care  []  Update interventions per flow sheet       []  Discharge due to:_  []  Other:_      Farhad Liu, ASHU 2/27/2017  2:27 PM    Future Appointments  Date Time Provider Niraj Light   3/2/2017 2:30 PM Silvia COOPER THE [facility]

## 2017-02-28 ENCOUNTER — APPOINTMENT (OUTPATIENT)
Dept: PHYSICAL THERAPY | Age: 82
End: 2017-02-28
Payer: MEDICARE

## 2017-03-02 ENCOUNTER — HOSPITAL ENCOUNTER (OUTPATIENT)
Dept: PHYSICAL THERAPY | Age: 82
Discharge: HOME | End: 2017-03-02
Payer: MEDICARE

## 2017-03-02 PROCEDURE — 97110 THERAPEUTIC EXERCISES: CPT

## 2017-03-02 NOTE — PROGRESS NOTES
PT DAILY TREATMENT NOTE - [facility]     Patient Name: Jose A Orozco  Date:3/2/2017  : 1925  [x]  Patient  Verified  Payor: Cody Selby / Plan: VA MEDICARE PART A & B / Product Type: Medicare /    In time:145  Out time:230  Total Treatment Time (min): 45  Total Timed Codes (min): 45  1:1 Treatment Time ( [address] only): 45   Visit #: 9 of 16    Treatment Area: Dizziness [R42]  Weakness [R53.1]    SUBJECTIVE  Pain Level (0-10 scale): 2  Any medication changes, allergies to medications, adverse drug reactions, diagnosis change, or new procedure performed?: [x] No    [] Yes (see summary sheet for update)  Subjective functional status/changes:     [x] No changes reported    OBJECTIVE  45 min Therapeutic Exercise:  [] See flow sheet :   Rationale: increase ROM, increase strength and improve coordination          With   [] TE   [] TA   [] neuro   [] other: Patient Education: [x] Review HEP    [] Progressed/Changed HEP based on:   [] positioning   [] body mechanics   [] transfers   [] heat/ice application    [] other:      Other Objective/Functional Measures:   Cueing still with proper stride and step length  difficulty performing head turns while ambulating      Pain Level (0-10 scale) post treatment: 2    ASSESSMENT/Changes in Function:   Focused on vertical and horizontal head turns while ambulating with rollator    Patient will continue to benefit from skilled PT services to modify and progress therapeutic interventions, address functional mobility deficits, address ROM deficits and address strength deficits to attain remaining goals. [x]  See Plan of Care  []  See progress note/recertification  []  See Discharge Summary         Progress towards goals / Updated goals:  Short Term Goals (To be achieved in 4 weeks)  1) Increase strength of bilateral LEs by 1/3 grade in order to improve ability to transfer from various surfaces to standing without assistance.   Status at initial evaluation: strength of LEs grossly 4-/5 to 5/5 bilateral LEs  Current status: residual LE weakness (4-/5) and difficulty performing sit to stand without use of hands, progressing slowly      2) Pt will be independent and compliant with HEP to achieve other goals. Status at initial evaluation: pt is not independent with exercises. Current status: daily compliance with HEP, goal met      3)Improved DHI score to ,or= 30 points as evidence of improved dizziness with ADL  Status at [facility]: [address] [place] [address] 56/100  Current status: reports 5% subjective improvement, DHI improved to 52 points, progressing slowly      Long Term Goals (To be achieved in 8 weeks)  1) Pt will be able to demonstrate ability to ambulate with least restrictive assistive device x 200 feet with SBA or better in order to mobilize more independently. Status at initial evaluation: pt ambulating with rollator with SBA  Current status: good endurance and ability to ambulate > 20 feet with CGA or rollator, reports fear of falling, unable to ambulate without assist, reports wall walking at home, progressing      2) Pt will increase strength of bilateral LEs to 4+/5 or better in order to normalize function, climb stairs, and allow pt to independently transfer from various surfaces to standing without assistance. Status at initial evaluation: strength of LEs grossly 4-/5 to 5/5 bilateral LEs  Current status: increased ease with ADL and ambulation, LE strength progressing slowly, gross strength 4-/5, progressing      3) Pt will be able to independently climb stairs x14 steps with reciprocal gait pattern with 1-2 hand rails without safety concerns in order to normalize mobility.   Status at initial evaluation: pt ambulating with inconsistent reciprocal stepping pattern using 2 hand rails   Current status: able to perform stair ambulation/14 steps with verbal cues with reciprocal gait pattern, using both HR, requires SBA for safety, progressing      4) FGA score will increase 4 points in order to indicate significant improvement in dynamic balance and decrease risk of falls.   Status at initial evaluation: FGA: 10/30  Current status: FGA unchanged, goal not met    PLAN  [x]  Upgrade activities as tolerated     [x]  Continue plan of care  []  Update interventions per flow sheet       []  Discharge due to:_  []  Other:_      Allan Stover PTA 3/2/2017  2:39 PM    Future Appointments  Date Time Provider Niraj Light   3/7/2017 3:00 PM ASHU Anderson [facility]   3/9/2017 3:30 PM ASHU Anderson [facility]   3/13/2017 2:00 PM Amanda Bond, PT [facility]   3/16/2017 3:00 PM ASHU Anderson THE [facility]

## 2017-03-07 ENCOUNTER — HOSPITAL ENCOUNTER (OUTPATIENT)
Dept: PHYSICAL THERAPY | Age: 82
Discharge: HOME | End: 2017-03-07
Payer: MEDICARE

## 2017-03-07 PROCEDURE — 97110 THERAPEUTIC EXERCISES: CPT

## 2017-03-07 NOTE — PROGRESS NOTES
PT DAILY TREATMENT NOTE - [facility]     Patient Name: Basilio Bocanegra  Date:3/7/2017  : 1925  [x]  Patient  Verified  Payor: Nohemi Bhardwaj / Plan: [place] MEDICARE PART A & B / Product Type: Medicare /    In time:310  Out time:350  Total Treatment Time (min): 40  Total Timed Codes (min): 40  1:1 Treatment Time ( [address] only): 40   Visit #: 10 of 16    Treatment Area: Dizziness [R42]  Weakness [R53.1]    SUBJECTIVE  Pain Level (0-10 scale): 2  Any medication changes, allergies to medications, adverse drug reactions, diagnosis change, or new procedure performed?: [x] No    [] Yes (see summary sheet for update)  Subjective functional status/changes:   [] No changes reported  States not being active with HEP over the past week, has been fatigued    OBJECTIVE      40 min Therapeutic Exercise:  [] See flow sheet :   Rationale: increase ROM, increase strength and improve coordination           With   [] TE   [] TA   [] neuro   [] other: Patient Education: [x] Review HEP    [] Progressed/Changed HEP based on:   [] positioning   [] body mechanics   [] transfers   [] heat/ice application    [] other:      Other Objective/Functional Measures:  Thigh pain with squats  Moderate cuing to maintain focus and form with TE. Frequent rest breaks today       Pain Level (0-10 scale) post treatment: 2    ASSESSMENT/Changes in Function:   Pt sates she will be more active with HEP and use of recumbent bike at home. Pt advised about the importance of consistency with HEP to see improvement in function     Patient will continue to benefit from skilled PT services to modify and progress therapeutic interventions, address functional mobility deficits, address ROM deficits and address strength deficits to attain remaining goals.      [x]  See Plan of Care  []  See progress note/recertification  []  See Discharge Summary         Progress towards goals / Updated goals:  Short Term Goals (To be achieved in 4 weeks)  1) Increase strength of bilateral LEs by 1/3 grade in order to improve ability to transfer from various surfaces to standing without assistance. Status at initial evaluation: strength of LEs grossly 4-/5 to 5/5 bilateral LEs  Current status: residual LE weakness (4-/5) and difficulty performing sit to stand without use of hands, progressing slowly      2) Pt will be independent and compliant with HEP to achieve other goals. Status at initial evaluation: pt is not independent with exercises. Current status: daily compliance with HEP, goal met      3)Improved DHI score to ,or= 30 points as evidence of improved dizziness with ADL  Status at [facility]: [address] [place] [address] 56/100  Current status: reports 5% subjective improvement, DHI improved to 52 points, progressing slowly      Long Term Goals (To be achieved in 8 weeks)  1) Pt will be able to demonstrate ability to ambulate with least restrictive assistive device x 200 feet with SBA or better in order to mobilize more independently. Status at initial evaluation: pt ambulating with rollator with SBA  Current status: good endurance and ability to ambulate > 20 feet with CGA or rollator, reports fear of falling, unable to ambulate without assist, reports wall walking at home, progressing      2) Pt will increase strength of bilateral LEs to 4+/5 or better in order to normalize function, climb stairs, and allow pt to independently transfer from various surfaces to standing without assistance. Status at initial evaluation: strength of LEs grossly 4-/5 to 5/5 bilateral LEs  Current status: increased ease with ADL and ambulation, LE strength progressing slowly, gross strength 4-/5, progressing      3) Pt will be able to independently climb stairs x14 steps with reciprocal gait pattern with 1-2 hand rails without safety concerns in order to normalize mobility.   Status at initial evaluation: pt ambulating with inconsistent reciprocal stepping pattern using 2 hand rails   Current status: able to perform stair ambulation/14 steps with verbal cues with reciprocal gait pattern, using both HR, requires SBA for safety, progressing      4) FGA score will increase 4 points in order to indicate significant improvement in dynamic balance and decrease risk of falls.   Status at initial evaluation: FGA: 10/30  Current status: FGA unchanged, goal not met       PLAN  [x]  Upgrade activities as tolerated     [x]  Continue plan of care  []  Update interventions per flow sheet       []  Discharge due to:_  []  Other:_      Marisa Chun PTA 3/7/2017  5:19 PM    Future Appointments  Date Time Provider Niraj Light   3/9/2017 3:30 PM ASHU [facility]BAM [facility]   3/13/2017 2:00 PM Amanda COOPER THE [facility]   3/16/2017 3:00 PM Marisa Chun PTA [facility]

## 2017-03-09 ENCOUNTER — HOSPITAL ENCOUNTER (OUTPATIENT)
Dept: PHYSICAL THERAPY | Age: 82
Discharge: HOME | End: 2017-03-09
Payer: MEDICARE

## 2017-03-09 PROCEDURE — 97530 THERAPEUTIC ACTIVITIES: CPT

## 2017-03-09 NOTE — PROGRESS NOTES
PT DAILY TREATMENT NOTE - [facility]     Patient Name: Loida Ibarra  Date:3/9/2017  : 1925  [x]  Patient  Verified  Payor: Linder Cheadle / Plan: [place] MEDICARE PART A & B / Product Type: Medicare /    In time:230  Out time:315  Total Treatment Time (min): 45  Total Timed Codes (min): 45  1:1 Treatment Time ([address] only): 45   Visit #: 11 of 16    Treatment Area: Dizziness [R42]  Weakness [R53.1]    SUBJECTIVE  Pain Level (0-10 scale): Any medication changes, allergies to medications, adverse drug reactions, diagnosis change, or new procedure performed?: [x] No    [] Yes (see summary sheet for update)  Subjective functional status/changes:   [] No changes reported  Reports increased use of recumbent bike at home    OBJECTIVE      45 min Therapeutic Act:  [] See flow sheet :   Rationale: increase ROM, increase strength and improve coordination          With   [] TE   [] TA   [] neuro   [] other: Patient Education: [x] Review HEP    [] Progressed/Changed HEP based on:   [] positioning   [] body mechanics   [] transfers   [] heat/ice application    [] other:      Other Objective/Functional Measures:   Improved standing stability  Use of hands with NBOS activties     Pain Level (0-10 scale) post treatment: 0    ASSESSMENT/Changes in Function:   Pt making slow progress towards goals with no significant chnages at this time. Overall decrease hip pain over the past week and imroved coordination with TE. Constant cueing still required for proper gait with rollator. Patient will continue to benefit from skilled PT services to modify and progress therapeutic interventions, address functional mobility deficits, address ROM deficits and address strength deficits to attain remaining goals.      [x]  See Plan of Care  []  See progress note/recertification  []  See Discharge Summary         Progress towards goals / Updated goals:  Short Term Goals (To be achieved in 4 weeks)  1) Increase strength of bilateral LEs by 1/3 grade in order to improve ability to transfer from various surfaces to standing without assistance. Status at initial evaluation: strength of LEs grossly 4-/5 to 5/5 bilateral LEs  Current status: residual LE weakness (4-/5) and difficulty performing sit to stand without use of hands, progressing slowly      2) Pt will be independent and compliant with HEP to achieve other goals. Status at initial evaluation: pt is not independent with exercises. Current status: daily compliance with HEP, goal met      3)Improved DHI score to ,or= 30 points as evidence of improved dizziness with ADL  Status at [facility]: [address] [place] [address] 56/100  Current status: reports 5% subjective improvement, DHI improved to 52 points, progressing slowly      Long Term Goals (To be achieved in 8 weeks)  1) Pt will be able to demonstrate ability to ambulate with least restrictive assistive device x 200 feet with SBA or better in order to mobilize more independently. Status at initial evaluation: pt ambulating with rollator with SBA  Current status: good endurance and ability to ambulate > 20 feet with CGA or rollator, reports fear of falling, unable to ambulate without assist, reports wall walking at home, progressing      2) Pt will increase strength of bilateral LEs to 4+/5 or better in order to normalize function, climb stairs, and allow pt to independently transfer from various surfaces to standing without assistance. Status at initial evaluation: strength of LEs grossly 4-/5 to 5/5 bilateral LEs  Current status: increased ease with ADL and ambulation, LE strength progressing slowly, gross strength 4-/5, progressing      3) Pt will be able to independently climb stairs x14 steps with reciprocal gait pattern with 1-2 hand rails without safety concerns in order to normalize mobility.   Status at initial evaluation: pt ambulating with inconsistent reciprocal stepping pattern using 2 hand rails   Current status: able to perform stair ambulation/14 steps with verbal cues with reciprocal gait pattern, using both HR, requires SBA for safety, progressing      4) FGA score will increase 4 points in order to indicate significant improvement in dynamic balance and decrease risk of falls.   Status at initial evaluation: FGA: 10/30  Current status: FGA unchanged, goal not met    PLAN  [x]  Upgrade activities as tolerated     [x]  Continue plan of care  []  Update interventions per flow sheet       []  Discharge due to:_  []  Other:_      Malorie Gonzalez PTA 3/9/2017  3:21 PM    Future Appointments  Date Time Provider Niraj Light   3/13/2017 2:00 PM [facility]   3/16/2017 3:00 PM [facility]

## 2017-03-13 ENCOUNTER — HOSPITAL ENCOUNTER (OUTPATIENT)
Dept: PHYSICAL THERAPY | Age: 82
Discharge: HOME | End: 2017-03-13
Payer: MEDICARE

## 2017-03-13 PROCEDURE — 97530 THERAPEUTIC ACTIVITIES: CPT

## 2017-03-13 PROCEDURE — 97110 THERAPEUTIC EXERCISES: CPT

## 2017-03-13 PROCEDURE — G8978 MOBILITY CURRENT STATUS: HCPCS

## 2017-03-13 PROCEDURE — G8979 MOBILITY GOAL STATUS: HCPCS

## 2017-03-14 NOTE — PROGRESS NOTES
PT DAILY TREATMENT NOTE - [facility]     Patient Name: Viji Oh  Date:3/13/2017  : 1925  [x]  Patient  Verified  Payor: Germaine Mendoza / Plan: [place] MED[place]ARE PART A & B / Product Type: Medicare /    In time:2  Out time:245  Total Treatment Time (min): 45  Total Timed Codes (min): 45  1:1 Treatment Time ( [address] only): 40   Visit #: 12 of 16    Treatment Area: Dizziness [R42]  Weakness [R53.1]    SUBJECTIVE  Pain Level (0-10 scale): 2  Any medication changes, allergies to medications, adverse drug reactions, diagnosis change, or new procedure performed?: [x] No    [] Yes (see summary sheet for update)  Subjective functional status/changes:   [x] No changes reported  No pain, fear of falling, ambulating with rollator    OBJECTIVE      15 min Therapeutic Exercise:  [x] See flow sheet :   Rationale: increase ROM, increase strength and improve coordination     30 min Therapeutic Activity including reevaluation, balance activities, gait training without AD with min assist including obstacle course           With   [] TE   [] TA   [] neuro   [] other: Patient Education: [x] Review HEP    [] Progressed/Changed HEP based on:   [] positioning   [] body mechanics   [] transfers   [] heat/ice application    [] other:      Other Objective/Functional Measures:  No reports of pain with TA  Needs CGA for most activities,   FOTO 40, regressed but DHI 46 improved       Pain Level (0-10 scale) post treatment: 0    ASSESSMENT/Changes in Function:   Patient motivated, needs assist with obstacle course     Patient will continue to benefit from skilled PT services to modify and progress therapeutic interventions, address functional mobility deficits, address ROM deficits and address strength deficits to attain remaining goals. [x]  See Plan of Care  [x]  See progress note/recertification  []  See Discharge Summary         Progress towards goals / Updated goals:Patient is showing slow progress and has met 2 goals.  I recommend continuation of present treatment to address remaining goals. Short Term Goals (To be achieved in 4 weeks)  1) Increase strength of bilateral LEs by 1/3 grade in order to improve ability to transfer from various surfaces to standing without assistance. Status at initial evaluation: strength of LEs grossly 4-/5 to 5/5 bilateral LEs  Current status: residual LE weakness (4-/5) and difficulty performing sit to stand without use of hands, progressing      2) Pt will be independent and compliant with HEP to achieve other goals. Status at initial evaluation: pt is not independent with exercises. Current status: daily compliance with HEP, goal met      3)Improved DHI score to <or= 30 points as evidence of improved dizziness with ADL  Status at [facility]: [address] [place] [address] 56/100  Current status: DHI 46 points, progressing      Long Term Goals (To be achieved in 8 weeks)  1) Pt will be able to demonstrate ability to ambulate with least restrictive assistive device x 200 feet with SBA or better in order to mobilize more independently. Status at initial evaluation: pt ambulating with rollator with SBA  Current status: good endurance and ability to ambulate > 20 feet with CGA or rollator, reports fear of falling, unable to ambulate without assist, reports wall walking at home, progressing      2) Pt will increase strength of bilateral LEs to 4+/5 or better in order to normalize function, climb stairs, and allow pt to independently transfer from various surfaces to standing without assistance. Status at initial evaluation: strength of LEs grossly 4-/5 to 5/5 bilateral LEs  Current status: increased ease with ADL and ambulation, LE strength progressing slowly, gross strength 4-/5, progressing      3) Pt will be able to independently climb stairs x14 steps with reciprocal gait pattern with 1-2 hand rails without safety concerns in order to normalize mobility.   Status at initial evaluation: pt ambulating with inconsistent reciprocal stepping pattern using 2 hand rails   Current status: able to perform stair ambulation/14 steps with verbal cues with reciprocal gait pattern, using both HR, goal met      4) FGA score will increase 4 points in order to indicate significant improvement in dynamic balance and decrease risk of falls.   Status at initial evaluation: FGA: 10/30  Current status: FGA unchanged, goal not met       PLAN  [x]  Upgrade activities as tolerated     [x]  Continue plan of care for additional 4 weeks to address remaining goals  []  Update interventions per flow sheet       []  Discharge due to:_  []  Other:_      Billie Kent, PT 3/13/2017  5:19 PM    Future Appointments  Date Time Provider Niraj Light   3/16/2017 3:00 PM Margot Peace PTA [facility]

## 2017-03-14 NOTE — PROGRESS NOTES
[facility] [address] [place], [facility] [address]  Phone: [phone]      Fax:  [phone]    Continued Plan of Care/ Re-certification for Physical Therapy Services    Patient name: Joan Tsai Start of Care: 17   Referral source: Yolanda Bennett MD : 1925   Medical/Treatment Diagnosis: Dizziness [R42]  Weakness [R53.1] Onset Date:2016     Prior Hospitalization: see medical history Provider#: [id number]   Medications: Verified on Patient Summary List    Comorbidities: UTI, HTN, visual impairment, hearing impairment, fall risk  Prior Level of Function:ambulation with rollator, requires assistance with ADLs except bathing and dressing    Visits from Start of Care: 12    Missed Visits: 2    The Plan of Care and following information is based on the patient's current status:    Progress towards goals / Updated goals:Patient is showing slow progress and has met 2 goals. I recommend continuation of present treatment to address remaining goals. Short Term Goals (To be achieved in 4 weeks)  1) Increase strength of bilateral LEs by 1/3 grade in order to improve ability to transfer from various surfaces to standing without assistance. Status at initial evaluation: strength of LEs grossly 4-/5 to 5/5 bilateral LEs  Current status: residual LE weakness (4-/5) and difficulty performing sit to stand without use of hands, progressing      2) Pt will be independent and compliant with HEP to achieve other goals. Status at initial evaluation: pt is not independent with exercises.   Current status: daily compliance with HEP, goal met      3)Improved DHI score to <or= 30 points as evidence of improved dizziness with ADL  Status at [facility]: [address] [place] [address] 56/100  Current status: DHI 46 points, progressing      Long Term Goals (To be achieved in 8 weeks)  1) Pt will be able to demonstrate ability to ambulate with least restrictive assistive device x 200 feet with SBA or better in order to mobilize more independently. Status at initial evaluation: pt ambulating with rollator with SBA  Current status: good endurance and ability to ambulate > 20 feet with CGA or rollator, reports fear of falling, unable to ambulate without assist, reports wall walking at home, progressing      2) Pt will increase strength of bilateral LEs to 4+/5 or better in order to normalize function, climb stairs, and allow pt to independently transfer from various surfaces to standing without assistance. Status at initial evaluation: strength of LEs grossly 4-/5 to 5/5 bilateral LEs  Current status: increased ease with ADL and ambulation, LE strength progressing slowly, gross strength 4-/5, progressing      3) Pt will be able to independently climb stairs x14 steps with reciprocal gait pattern with 1-2 hand rails without safety concerns in order to normalize mobility. Status at initial evaluation: pt ambulating with inconsistent reciprocal stepping pattern using 2 hand rails   Current status: able to perform stair ambulation/14 steps with verbal cues with reciprocal gait pattern, using both HR, goal met      4) FGA score will increase 4 points in order to indicate significant improvement in dynamic balance and decrease risk of falls.   Status at initial evaluation: FGA: 10/30  Current status: FGA unchanged, goal not met    Key functional changes: increased ease with transfers      Problems/ barriers to goal attainment: fear of falling, fall risk, deficit in ambulatory balance     Problem List: impaired gait/ balance, decrease ADL/ functional abilitiies, decrease activity tolerance, decrease flexibility/ joint mobility and decrease transfer abilities    Treatment Plan: Therapeutic exercise, Therapeutic activities, Neuromuscular re-education, Physical agent/modality, Gait/balance training, Manual therapy and Patient education         Frequency / Duration: Patient to be seen 2 times per week for 4 weeks:    Assessment / Recommendations:continue with present treatment, focus on ambulatory balance and general strength    G-Codes (GP)  Mobility  [id number] Current  CL= 60-79%   Goal  CJ= 20-39%  Position    Carry    Self Care      The severity rating is based on clinical judgment and the FOTO score. Certification Period: 3/18/17-4/17/17    Isabelle Quach, PT 3/13/2017 9:50 PM    ________________________________________________________________________  I certify that the above Therapy Services are being furnished while the patient is under my care. I agree with the treatment plan and certify that this therapy is necessary. [] I have read the above and request that my patient continue as recommended. [] I have read the above report and request that my patient continue therapy with the following changes/special instructions: ______________________________________  [] I have read the above report and request that my patient be discharged from therapy    Physician's Signature:_______________________________Date:___________Time:__________    Please sign and return to   [facility] in [address]  Phone: [phone]      Fax:  [phone]

## 2017-03-16 ENCOUNTER — HOSPITAL ENCOUNTER (OUTPATIENT)
Dept: PHYSICAL THERAPY | Age: 82
Discharge: HOME | End: 2017-03-16
Payer: MEDICARE

## 2017-03-16 PROCEDURE — 97530 THERAPEUTIC ACTIVITIES: CPT

## 2017-03-16 NOTE — PROGRESS NOTES
PT DAILY TREATMENT NOTE - [facility]     Patient Name: Josh Urban  Date:3/16/2017  : 1925  [x]  Patient  Verified  Payor: [place] MEDICARE / Plan: [place] MEDICARE PART A & B / Product Type: Medicare /    In time:300  Out time:355  Total Treatment Time (min): 55  Total Timed Codes (min): 55  1:1 Treatment Time ([facility] only): 40   Visit #: 13 of 16    Treatment Area: Dizziness [R42]  Weakness [R53.1]    SUBJECTIVE  Pain Level (0-10 scale): 0  Any medication changes, allergies to medications, adverse drug reactions, diagnosis change, or new procedure performed?: [x] No    [] Yes (see summary sheet for update)  Subjective functional status/changes:   [] No changes reported  Has been active with Vestibular exercises     OBJECTIVE    55 min Therapeutic Activity:  []  See flow sheet :   Rationale: increase ROM, increase strength and improve coordination           With   [] TE   [] TA   [] neuro   [] other: Patient Education: [x] Review HEP    [] Progressed/Changed HEP based on:   [] positioning   [] body mechanics   [] transfers   [] heat/ice application    [] other:      Other Objective/Functional Measures:   External support required for negotiating obstacles     Pain Level (0-10 scale) post treatment: 0    ASSESSMENT/Changes in Function:   Showing overall improved coordination  with balance related activities    Patient will continue to benefit from skilled PT services to modify and progress therapeutic interventions, address functional mobility deficits, address ROM deficits and address strength deficits to attain remaining goals. [x]  See Plan of Care  []  See progress note/recertification  []  See Discharge Summary         Progress towards goals / Updated goals:  Short Term Goals (To be achieved in 4 weeks)  1) Increase strength of bilateral LEs by 1/3 grade in order to improve ability to transfer from various surfaces to standing without assistance.   Status at initial evaluation: strength of LEs grossly 4-/5 to 5/5 bilateral LEs  Current status: residual LE weakness (4-/5) and difficulty performing sit to stand without use of hands, progressing      2) Pt will be independent and compliant with HEP to achieve other goals. Status at initial evaluation: pt is not independent with exercises. Current status: daily compliance with HEP, goal met      3)Improved DHI score to <or= 30 points as evidence of improved dizziness with ADL  Status at [facility]: [address] [place] [address] 56/100  Current status: DHI 46 points, progressing      Long Term Goals (To be achieved in 8 weeks)  1) Pt will be able to demonstrate ability to ambulate with least restrictive assistive device x 200 feet with SBA or better in order to mobilize more independently. Status at initial evaluation: pt ambulating with rollator with SBA  Current status: good endurance and ability to ambulate > 20 feet with CGA or rollator, reports fear of falling, unable to ambulate without assist, reports wall walking at home, progressing      2) Pt will increase strength of bilateral LEs to 4+/5 or better in order to normalize function, climb stairs, and allow pt to independently transfer from various surfaces to standing without assistance. Status at initial evaluation: strength of LEs grossly 4-/5 to 5/5 bilateral LEs  Current status: increased ease with ADL and ambulation, LE strength progressing slowly, gross strength 4-/5, progressing      3) Pt will be able to independently climb stairs x14 steps with reciprocal gait pattern with 1-2 hand rails without safety concerns in order to normalize mobility. Status at initial evaluation: pt ambulating with inconsistent reciprocal stepping pattern using 2 hand rails   Current status: able to perform stair ambulation/14 steps with verbal cues with reciprocal gait pattern, using both HR, goal met      4) FGA score will increase 4 points in order to indicate significant improvement in dynamic balance and decrease risk of falls.   Status at initial evaluation: FGA: 10/30  Current status: FGA unchanged, goal not met     Key functional changes: increased ease with transfers      Problems/ barriers to goal attainment: fear of falling, fall risk, deficit in ambulatory balance      Problem List: impaired gait/ balance, decrease ADL/ functional abilitiies, decrease activity tolerance, decrease flexibility/ joint mobility and decrease transfer abilities     Treatment Plan: Therapeutic exercise, Therapeutic activities, Neuromuscular re-education, Physical agent/modality, Gait/balance training, Manual therapy and Patient education     PLAN  [x]  Upgrade activities as tolerated     [x]  Continue plan of care  []  Update interventions per flow sheet       []  Discharge due to:_  []  Other:_      Marisa Harness, PTA 3/16/2017  4:18 PM    Future Appointments  Date Time Provider Niraj Light   3/21/2017 1:00 PM Marisa Harness, PTA [facility]   3/23/2017 2:30 PM Marisa Harness, PTA [facility]   3/27/2017 1:30 PM Marisa Harness, PTA [facility]   3/30/2017 2:30 PM Marisa Harness, PTA [facility]

## 2017-03-21 ENCOUNTER — HOSPITAL ENCOUNTER (OUTPATIENT)
Dept: PHYSICAL THERAPY | Age: 82
Discharge: HOME | End: 2017-03-21
Payer: MEDICARE

## 2017-03-21 PROCEDURE — 97110 THERAPEUTIC EXERCISES: CPT

## 2017-03-21 NOTE — PROGRESS NOTES
PT DAILY TREATMENT NOTE - [facility]     Patient Name: Austin Almendarez  Date:3/21/2017  : 1925  [x]  Patient  Verified  Payor: [place] MEDICARE / Plan: [place] MEDICARE PART A & B / Product Type: Medicare /    In time:110  Out time:155  Total Treatment Time (min): 45  Total Timed Codes (min): 45  1:1 Treatment Time ( [address] only): 39   Visit #: 14 of 16    Treatment Area: Dizziness [R42]  Weakness [R53.1]    SUBJECTIVE  Pain Level (0-10 scale): 0  Any medication changes, allergies to medications, adverse drug reactions, diagnosis change, or new procedure performed?: [x] No    [] Yes (see summary sheet for update)  Subjective functional status/changes:   [] No changes reported  Reports not being very active at home. Feels generally fatigued. \"feel like matthew take 3 steps forward and 2 steps back\"    OBJECTIVE    []redness  adverse reaction:       45 min Therapeutic Exercise:  [] See flow sheet :   Rationale: increase ROM, increase strength and improve coordination    With   [] TE   [] TA   [] neuro   [] other: Patient Education: [x] Review HEP    [] Progressed/Changed HEP based on:   [] positioning   [] body mechanics   [] transfers   [] heat/ice application    [] other:      Other Objective/Functional Measures:   Requires use of hands with most WS TE       Pain Level (0-10 scale) post treatment: 0    ASSESSMENT/Changes in Function:   No pain with TE. Increased reps today with no adverse effects. LOB still with NBOS MSR    Patient will continue to benefit from skilled PT services to modify and progress therapeutic interventions, address functional mobility deficits, address ROM deficits and address strength deficits to attain remaining goals.      [x]  See Plan of Care  []  See progress note/recertification  []  See Discharge Summary         Progress towards goals / Updated goals:  Short Term Goals (To be achieved in 4 weeks)  1) Increase strength of bilateral LEs by 1/3 grade in order to improve ability to transfer from various surfaces to standing without assistance. Status at initial evaluation: strength of LEs grossly 4-/5 to 5/5 bilateral LEs  Current status: residual LE weakness (4-/5) and difficulty performing sit to stand without use of hands, progressing      2) Pt will be independent and compliant with HEP to achieve other goals. Status at initial evaluation: pt is not independent with exercises. Current status: daily compliance with HEP, goal met      3)Improved DHI score to <or= 30 points as evidence of improved dizziness with ADL  Status at [facility]: [address] [place] [address] 56/100  Current status: DHI 46 points, progressing      Long Term Goals (To be achieved in 8 weeks)  1) Pt will be able to demonstrate ability to ambulate with least restrictive assistive device x 200 feet with SBA or better in order to mobilize more independently. Status at initial evaluation: pt ambulating with rollator with SBA  Current status: good endurance and ability to ambulate > 20 feet with CGA or rollator, reports fear of falling, unable to ambulate without assist, reports wall walking at home, progressing      2) Pt will increase strength of bilateral LEs to 4+/5 or better in order to normalize function, climb stairs, and allow pt to independently transfer from various surfaces to standing without assistance. Status at initial evaluation: strength of LEs grossly 4-/5 to 5/5 bilateral LEs  Current status: increased ease with ADL and ambulation, LE strength progressing slowly, gross strength 4-/5, progressing, no further change      3) Pt will be able to independently climb stairs x14 steps with reciprocal gait pattern with 1-2 hand rails without safety concerns in order to normalize mobility.   Status at initial evaluation: pt ambulating with inconsistent reciprocal stepping pattern using 2 hand rails   Current status: able to perform stair ambulation/14 steps with verbal cues with reciprocal gait pattern, using both HR, goal met      4) FGA score will increase 4 points in order to indicate significant improvement in dynamic balance and decrease risk of falls.   Status at initial evaluation: FGA: 10/30  Current status: FGA unchanged, goal not met      Key functional changes: increased ease with transfers       Problems/ barriers to goal attainment: fear of falling, fall risk, deficit in ambulatory balance       Problem List: impaired gait/ balance, decrease ADL/ functional abilitiies, decrease activity tolerance, decrease flexibility/ joint mobility and decrease transfer abilities      Treatment Plan: Therapeutic exercise, Therapeutic activities, Neuromuscular re-education, Physical agent/modality, Gait/balance training, Manual therapy and Patient education     PLAN  [x]  Upgrade activities as tolerated     [x]  Continue plan of care  []  Update interventions per flow sheet       []  Discharge due to:_  []  Other:_      Viviana Leon PTA 3/21/2017  1:13 PM    Future Appointments  Date Time Provider Niraj Light   3/23/2017 2:30 PM ASHU [address] [facility]   3/27/2017 1:30 PM ASHU [address] [facility]   3/30/2017 2:30 PM ASUH [address] [facility]

## 2017-03-23 ENCOUNTER — APPOINTMENT (OUTPATIENT)
Dept: PHYSICAL THERAPY | Age: 82
End: 2017-03-23
Payer: MEDICARE

## 2017-03-27 ENCOUNTER — APPOINTMENT (OUTPATIENT)
Dept: PHYSICAL THERAPY | Age: 82
End: 2017-03-27
Payer: MEDICARE

## 2017-03-28 ENCOUNTER — APPOINTMENT (OUTPATIENT)
Dept: PHYSICAL THERAPY | Age: 82
End: 2017-03-28
Payer: MEDICARE

## 2017-03-30 ENCOUNTER — HOSPITAL ENCOUNTER (OUTPATIENT)
Dept: PHYSICAL THERAPY | Age: 82
Discharge: HOME | End: 2017-03-30
Payer: MEDICARE

## 2017-03-30 PROCEDURE — 97110 THERAPEUTIC EXERCISES: CPT

## 2017-03-30 NOTE — PROGRESS NOTES
PT DAILY TREATMENT NOTE - [facility]     Patient Name: Alka Dsouza  Date:3/30/2017  : 1925  [x]  Patient  Verified  Payor: [place] MEDICARE / Plan: [place] MEDICARE PART A & B / Product Type: Medicare /    In time:230  Out time:315  Total Treatment Time (min): 44  Total Timed Codes (min): 45  1:1 Treatment Time ([address] only): 39   Visit #: 15 of 16    Treatment Area: Dizziness [R42]  Weakness [R53.1]    SUBJECTIVE  Pain Level (0-10 scale): 0  Any medication changes, allergies to medications, adverse drug reactions, diagnosis change, or new procedure performed?: [x] No    [] Yes (see summary sheet for update)  Subjective functional status/changes:   [] No changes reported  Partially active with HEP. OBJECTIVE    45 min Therapeutic Exercise:  [] See flow sheet :   Rationale: increase ROM, increase strength and improve coordination           With   [] TE   [] TA   [] neuro   [] other: Patient Education: [x] Review HEP    [] Progressed/Changed HEP based on:   [] positioning   [] body mechanics   [] transfers   [] heat/ice application    [] other:      Other Objective/Functional Measures: Moderate verbal cuing with ambulation SPC and CGA. Decrease step length with LE. Cuing  Not to drag feet    Pain Level (0-10 scale) post treatment: 0    ASSESSMENT/Changes in Function:   Pt showing per poor carry over as of late with verbal cuing during gait. Has been inconsistent with HEP. Patient will continue to benefit from skilled PT services to modify and progress therapeutic interventions, address functional mobility deficits and address ROM deficits to attain remaining goals. [x]  See Plan of Care  []  See progress note/recertification  []  See Discharge Summary         Progress towards goals / Updated goals:  Short Term Goals (To be achieved in 4 weeks)  1) Increase strength of bilateral LEs by 1/3 grade in order to improve ability to transfer from various surfaces to standing without assistance.   Status at initial evaluation: strength of LEs grossly 4-/5 to 5/5 bilateral LEs  Current status: residual LE weakness (4-/5) and difficulty performing sit to stand without use of hands, progressing      2) Pt will be independent and compliant with HEP to achieve other goals. Status at initial evaluation: pt is not independent with exercises. Current status: daily compliance with HEP, goal met      3)Improved DHI score to <or= 30 points as evidence of improved dizziness with ADL  Status at [facility]: [address] [place] [address] 56/100  Current status: DHI 46 points, progressing      Long Term Goals (To be achieved in 8 weeks)  1) Pt will be able to demonstrate ability to ambulate with least restrictive assistive device x 200 feet with SBA or better in order to mobilize more independently. Status at initial evaluation: pt ambulating with rollator with SBA  Current status: good endurance and ability to ambulate > 20 feet with CGA or rollator, reports fear of falling, unable to ambulate without assist, reports wall walking at home, progressing      2) Pt will increase strength of bilateral LEs to 4+/5 or better in order to normalize function, climb stairs, and allow pt to independently transfer from various surfaces to standing without assistance. Status at initial evaluation: strength of LEs grossly 4-/5 to 5/5 bilateral LEs  Current status: increased ease with ADL and ambulation, LE strength progressing slowly, gross strength 4-/5, progressing, no further change      3) Pt will be able to independently climb stairs x14 steps with reciprocal gait pattern with 1-2 hand rails without safety concerns in order to normalize mobility.   Status at initial evaluation: pt ambulating with inconsistent reciprocal stepping pattern using 2 hand rails   Current status: able to perform stair ambulation/14 steps with verbal cues with reciprocal gait pattern, using both HR, goal met      4) FGA score will increase 4 points in order to indicate significant improvement in dynamic balance and decrease risk of falls.   Status at initial evaluation: FGA: 10/30  Current status: FGA unchanged, goal not met      Key functional changes: increased ease with transfers       Problems/ barriers to goal attainment: fear of falling, fall risk, deficit in ambulatory balance       Problem List: impaired gait/ balance, decrease ADL/ functional abilitiies, decrease activity tolerance, decrease flexibility/ joint mobility and decrease transfer abilities      Treatment Plan: Therapeutic exercise, Therapeutic activities, Neuromuscular re-education, Physical agent/modality, Gait/balance training, Manual therapy and Patient education        PLAN  [x]  Upgrade activities as tolerated     [x]  Continue plan of care  []  Update interventions per flow sheet       []  Discharge due to:_  []  Other:_      Ajit Dunn PTA 3/30/2017  4:08 PM    Future Appointments  Date Time Provider Niraj Light   4/3/2017 2:30 PM ASHU BhatBAM [facility]   4/10/2017 1:30 PM DARYL SnyderHPBAM [facility]   4/13/2017 2:30 PM Amanda Lee, DARYL RESENDIZHPBAM [facility]

## 2017-04-03 ENCOUNTER — HOSPITAL ENCOUNTER (OUTPATIENT)
Dept: PHYSICAL THERAPY | Age: 82
Discharge: HOME | End: 2017-04-03
Payer: MEDICARE

## 2017-04-03 PROCEDURE — G8978 MOBILITY CURRENT STATUS: HCPCS

## 2017-04-03 PROCEDURE — 97110 THERAPEUTIC EXERCISES: CPT

## 2017-04-03 PROCEDURE — G8979 MOBILITY GOAL STATUS: HCPCS

## 2017-04-10 ENCOUNTER — HOSPITAL ENCOUNTER (OUTPATIENT)
Dept: PHYSICAL THERAPY | Age: 82
Discharge: HOME | End: 2017-04-10
Payer: MEDICARE

## 2017-04-10 PROCEDURE — 97110 THERAPEUTIC EXERCISES: CPT

## 2017-04-10 PROCEDURE — 97116 GAIT TRAINING THERAPY: CPT

## 2017-04-10 NOTE — PROGRESS NOTES
PT DAILY TREATMENT NOTE - [facility]     Patient Name: Calli Peguero  Date:4/10/2017  : 1925  [x]  Patient  Verified  Payor: [place] MEDICARE / Plan: [place] MEDICARE PART A & B / Product Type: Medicare /    In time:1:32  Out time:2:24  Total Treatment Time (min): 52  Total Timed Codes (min): 52  1:1 Treatment Time ( [address] only): 46   Visit #: 17 of 20    Treatment Area: Dizziness [R42]  Weakness [R53.1]    SUBJECTIVE  Pain Level (0-10 scale): 0/10  Any medication changes, allergies to medications, adverse drug reactions, diagnosis change, or new procedure performed?: [x] No    [] Yes (see summary sheet for update)  Subjective functional status/changes:   [] No changes reported  \"Im fine, just a tad dizzy\"     OBJECTIVE    Modality rationale:    Min Type Additional Details    [] Estim:  []Unatt       []IFC  []Premod                        []Other:  []w/ice   []w/heat  Position:  Location:    [] Estim: []Att    []TENS instruct  []NMES                    []Other:  []w/US   []w/ice   []w/heat  Position:  Location:    []  Traction: [] Cervical       []Lumbar                       [] Prone          []Supine                       []Intermittent   []Continuous Lbs:  [] before manual  [] after manual    []  Ultrasound: []Continuous   [] Pulsed                           []1MHz   []3MHz W/cm2:  Location:    []  Iontophoresis with dexamethasone         Location: [] Take home patch   [] In clinic    []  Ice     []  heat  []  Ice massage  []  Laser   []  Anodyne Position:  Location:    []  Laser with stim  []  Other:  Position:  Location:    []  Vasopneumatic Device Pressure:       [] lo [] med [] hi   Temperature: [] lo [] med [] hi   [] Skin assessment post-treatment:  []intact []redness- no adverse reaction    []redness  adverse reaction:      min []Eval                  []Re-Eval       27  1:1 min Therapeutic Exercise:  [] See flow sheet :   Rationale: increase ROM, increase strength, improve coordination, improve balance and increase proprioception to improve the patients ability to safely and more independently perform ADLs      min Therapeutic Activity:  []  See flow sheet :         min Neuromuscular Re-education:  []  See flow sheet :        min Manual Therapy:         25  1:1 min Gait Trainin feet with rollator on level surfaces with SBA level of assist   Rationale: With   [] TE   [] TA   [] neuro   [] other: Patient Education: [x] Review HEP    [] Progressed/Changed HEP based on:   [] positioning   [] body mechanics   [] transfers   [] heat/ice application    [] other:      Other Objective/Functional Measures:   LE strength  Hip flexion: 4-/5 bilaterally  Hip extension: 4/5 bilaterally  Hip adduction: 5/5 bilaterally  Hip abduction: 4+/5 bilaterally  Knee flexion: 4+/5 on right, 4-/5 on left  Knee extension: 4+/5 bilaterally  Ankle DF: 4+/5 bilaterally  Ankle PF: 4/5 on right, 4-/5 on left      Pain Level (0-10 scale) post treatment: 0/10    ASSESSMENT/Changes in Function:   Pt required several seated rest breaks to complete session. During gait training, she required VCs to stay within the base of support of her rollator. She also required VCs to avoid obstacles and maintain upright posture. Patient will continue to benefit from skilled PT services to modify and progress therapeutic interventions, address functional mobility deficits and address ROM deficits to attain remaining goals.      [] See Plan of Care  [x] See progress note/recertification  [] See Discharge Summary      Progress towards goals / Updated goals:   Long Term Goals (To be achieved in 4 weeks)  1) Increase strength of bilateral LEs by 1/3 grade in order to improve ability to transfer from various surfaces to standing without assistance.   Status at initial evaluation: strength of LEs grossly 4-/5 to 5/5 bilateral LEs  Status at last progress note (3/13/17): residual LE weakness (4-/5) and difficulty performing sit to stand without use of hands, progressing  Current Status: Progressing ( Hip flexion: 4-/5 bilaterally, Hip extension: 4/5 bilaterally, Hip adduction: 5/5 bilaterally, Hip abduction: 4+/5 bilaterally, Knee flexion: 4+/5 on right, 4-/5 on left, Knee extension: 4+/5 bilaterally, Ankle DF: 4+/5 bilaterally, Ankle PF: 4/5 on right, 4-/5 on left) 4/10/17        2)Improved DHI score to <or= 30 points as evidence of improved dizziness with ADL  Status at Eval: [address] [place] [address] 56/100  Status at last progress note (3/13/17): [address] [place] [address] 46 points, progressing  Current status: not reassessed       3) Pt will be able to demonstrate ability to ambulate with least restrictive assistive device x 200 feet with SBA or better in order to mobilize more independently. Status at initial evaluation: pt ambulating with rollator with SBA  Status at last progress note (3/13/17): good endurance and ability to ambulate > 20 feet with CGA or rollator, reports fear of falling, unable to ambulate without assist, reports wall walking at home, progressing  Current Status: MET (pt able to ambulate 230 feet using a rollator and SBA) 4/10/17       4) Pt will increase strength of bilateral LEs to 4+/5 or better in order to normalize function, climb stairs, and allow pt to independently transfer from various surfaces to standing without assistance.   Status at initial evaluation: strength of LEs grossly 4-/5 to 5/5 bilateral LEs  Status at last progress note (3/13/17): increased ease with ADL and ambulation, LE strength progressing slowly, gross strength 4-/5, progressing, no further change  Current Status: Progressing ( Hip flexion: 4-/5 bilaterally, Hip extension: 4/5 bilaterally, Hip adduction: 5/5 bilaterally, Hip abduction: 4+/5 bilaterally, Knee flexion: 4+/5 on right, 4-/5 on left, Knee extension: 4+/5 bilaterally, Ankle DF: 4+/5 bilaterally, Ankle PF: 4/5 on right, 4-/5 on left) 4/10/17        5) FGA score will increase 4 points in order to indicate significant improvement in dynamic balance and decrease risk of falls. Status at initial evaluation: FGA: 10/30  Status at last progress note (3/13/17):  FGA unchanged, goal not met  Current Status: not reassessed     PLAN  []  Upgrade activities as tolerated     [x]  Continue plan of care  []  Update interventions per flow sheet       []  Discharge due to:_  []  Other:_      Daniel Perez 4/10/2017  2:36 PM    Future Appointments  Date Time Provider Niraj Light   4/13/2017 2:30 PM Ty Moody PT [facility]

## 2017-04-13 ENCOUNTER — HOSPITAL ENCOUNTER (OUTPATIENT)
Dept: PHYSICAL THERAPY | Age: 82
Discharge: HOME | End: 2017-04-13
Payer: MEDICARE

## 2017-04-13 PROCEDURE — G8978 MOBILITY CURRENT STATUS: HCPCS

## 2017-04-13 PROCEDURE — G8979 MOBILITY GOAL STATUS: HCPCS

## 2017-04-13 PROCEDURE — 97530 THERAPEUTIC ACTIVITIES: CPT

## 2017-04-13 NOTE — PROGRESS NOTES
PT DAILY TREATMENT NOTE - [facility]     Patient Name: Melburn Do  Date:2017  : 1925  [x]  Patient  Verified  Payor: [place] MEDICARE / Plan: VA MEDICARE PART A & B / Product Type: Medicare /    In time:235  Out time:345  Total Treatment Time (min): 70  Total Timed Codes (min): 70  1:1 Treatment Time ( [address] only): 46   Visit #: 17 of 20    Treatment Area: Dizziness [R42]  Weakness [R53.1]    SUBJECTIVE  Pain Level (0-10 scale): 0/10  Any medication changes, allergies to medications, adverse drug reactions, diagnosis change, or new procedure performed?: [x] No    [] Yes (see summary sheet for update)  Subjective functional status/changes:   [] No changes reported  \"Im fine, just a tad dizzy\"     OBJECTIVE    Modality rationale:    Min Type Additional Details    [] Estim:  []Unatt       []IFC  []Premod                        []Other:  []w/ice   []w/heat  Position:  Location:    [] Estim: []Att    []TENS instruct  []NMES                    []Other:  []w/US   []w/ice   []w/heat  Position:  Location:    []  Traction: [] Cervical       []Lumbar                       [] Prone          []Supine                       []Intermittent   []Continuous Lbs:  [] before manual  [] after manual    []  Ultrasound: []Continuous   [] Pulsed                           []1MHz   []3MHz W/cm2:  Location:    []  Iontophoresis with dexamethasone         Location: [] Take home patch   [] In clinic    []  Ice     []  heat  []  Ice massage  []  Laser   []  Anodyne Position:  Location:    []  Laser with stim  []  Other:  Position:  Location:    []  Vasopneumatic Device Pressure:       [] lo [] med [] hi   Temperature: [] lo [] med [] hi   [] Skin assessment post-treatment:  []intact []redness- no adverse reaction    []redness  adverse reaction:      min []Eval                  []Re-Eval       40 min Therapeutic Exercise:  [] See flow sheet :   Rationale: increase ROM, increase strength, improve coordination, improve balance and increase proprioception to improve the patients ability to safely and more independently perform ADLs     30 min Therapeutic Activity:  [x]  See flow sheet :balance activities, gait training, transfer training         min Neuromuscular Re-education:  []  See flow sheet :        min Manual Therapy:                   With   [] TE   [] TA   [] neuro   [] other: Patient Education: [x] Review HEP    [] Progressed/Changed HEP based on:   [] positioning   [] body mechanics   [] transfers   [] heat/ice application    [] other:      Other Objective/Functional Measures:   LE strength  Hip flexion: 4-/5 bilaterally  Hip extension: 4/5 bilaterally  Hip adduction: 5/5 bilaterally  Hip abduction: 4+/5 bilaterally  Knee flexion: 4+/5 on right, 4-/5 on left  Knee extension: 4+/5 bilaterally  Ankle DF: 4+/5 bilaterally  Ankle PF: 4/5 on right, 4-/5 on left      Pain Level (0-10 scale) post treatment: 0/10    ASSESSMENT/Changes in Function:   Pt required several seated rest breaks to complete session. During gait training, she required VCs to stay within the base of support of her rollator. She also required VCs to avoid obstacles and maintain upright posture. Patient will continue to benefit from skilled PT services to modify and progress therapeutic interventions, address functional mobility deficits and address ROM deficits to attain remaining goals.      [] See Plan of Care  [x] See progress note/recertification  [] See Discharge Summary      Progress towards goals / Updated goals:   Long Term Goals (To be achieved in 4 weeks)  1) Increase strength of bilateral LEs by 1/3 grade in order to improve ability to transfer from various surfaces to standing without assistance.   Status at initial evaluation: strength of LEs grossly 4-/5 to 5/5 bilateral LEs  Status at last progress note (3/13/17): residual LE weakness (4-/5) and difficulty performing sit to stand without use of hands, progressing  Current Status: Progressing ( Hip flexion: 4-/5 bilaterally, Hip extension: 4/5 bilaterally, Hip adduction: 5/5 bilaterally, Hip abduction: 4+/5 bilaterally, Knee flexion: 4+/5 on right, 4-/5 on left, Knee extension: 4+/5 bilaterally, Ankle DF: 4+/5 bilaterally, Ankle PF: 4/5 on right, 4-/5 on left) 4/10/17        2)Improved DHI score to <or= 30 points as evidence of improved dizziness with ADL  Status at Eval: [address] [place] [address] 56/100  Status at last progress note (3/13/17): [address] [place] [address] 46 points, progressing  Current status: not reassessed       3) Pt will be able to demonstrate ability to ambulate with least restrictive assistive device x 200 feet with SBA or better in order to mobilize more independently. Status at initial evaluation: pt ambulating with rollator with SBA  Status at last progress note (3/13/17): good endurance and ability to ambulate > 20 feet with CGA or rollator, reports fear of falling, unable to ambulate without assist, reports wall walking at home, progressing  Current Status: MET (pt able to ambulate 230 feet using a rollator and SBA) 4/10/17       4) Pt will increase strength of bilateral LEs to 4+/5 or better in order to normalize function, climb stairs, and allow pt to independently transfer from various surfaces to standing without assistance. Status at initial evaluation: strength of LEs grossly 4-/5 to 5/5 bilateral LEs  Status at last progress note (3/13/17): increased ease with ADL and ambulation, LE strength progressing slowly, gross strength 4-/5, progressing, no further change  Current Status: Progressing ( Hip flexion: 4-/5 bilaterally, Hip extension: 4/5 bilaterally, Hip adduction: 5/5 bilaterally, Hip abduction: 4+/5 bilaterally, Knee flexion: 4+/5 on right, 4-/5 on left, Knee extension: 4+/5 bilaterally, Ankle DF: 4+/5 bilaterally, Ankle PF: 4/5 on right, 4-/5 on left) 4/10/17        5) FGA score will increase 4 points in order to indicate significant improvement in dynamic balance and decrease risk of falls.   Status at initial evaluation: FGA: 10/30  Status at last progress note (3/13/17): FGA unchanged, goal not met  Current Status: not reassessed     PLAN  []  Upgrade activities as tolerated     [x]  Continue plan of care  []  Update interventions per flow sheet       []  Discharge due to:_  []  Other:_      Pham Bain, PT 4/13/2017  2:36 PM    No future appointments.

## 2017-04-18 ENCOUNTER — APPOINTMENT (OUTPATIENT)
Dept: PHYSICAL THERAPY | Age: 82
End: 2017-04-18
Payer: MEDICARE

## 2017-04-20 ENCOUNTER — APPOINTMENT (OUTPATIENT)
Dept: PHYSICAL THERAPY | Age: 82
End: 2017-04-20
Payer: MEDICARE

## 2017-04-24 ENCOUNTER — HOSPITAL ENCOUNTER (OUTPATIENT)
Dept: PHYSICAL THERAPY | Age: 82
Discharge: HOME | End: 2017-04-24
Payer: MEDICARE

## 2017-04-24 PROCEDURE — 97530 THERAPEUTIC ACTIVITIES: CPT

## 2017-04-24 NOTE — PROGRESS NOTES
PT DAILY TREATMENT NOTE - [facility]     Patient Name: Abdulkadir Nguyen  Date:2017  : 1925  [x]  Patient  Verified  Payor: [place] MEDICARE / Plan: [place] MEDICARE PART A & B / Product Type: Medicare /    In time:1:25  Out time:225  Total Treatment Time (min): 60  Total Timed Codes (min): 60  1:1 Treatment Time ( only): 30   Visit #: 19 of 20 + 6    Treatment Area: Dizziness [R42]  Weakness [R53.1]    SUBJECTIVE  Pain Level (0-10 scale): 0/10  Any medication changes, allergies to medications, adverse drug reactions, diagnosis change, or new procedure performed?: [x] No    [] Yes (see summary sheet for update)  Subjective functional status/changes:   [x] No changes reported  I have been feeling weak since I was sick    OBJECTIVE    Modality rationale:    Min Type Additional Details    [] Estim:  []Unatt       []IFC  []Premod                        []Other:  []w/ice   []w/heat  Position:  Location:    [] Estim: []Att    []TENS instruct  []NMES                    []Other:  []w/US   []w/ice   []w/heat  Position:  Location:    []  Traction: [] Cervical       []Lumbar                       [] Prone          []Supine                       []Intermittent   []Continuous Lbs:  [] before manual  [] after manual    []  Ultrasound: []Continuous   [] Pulsed                           []1MHz   []3MHz W/cm2:  Location:    []  Iontophoresis with dexamethasone         Location: [] Take home patch   [] In clinic    []  Ice     []  heat  []  Ice massage  []  Laser   []  Anodyne Position:  Location:    []  Laser with stim  []  Other:  Position:  Location:    []  Vasopneumatic Device Pressure:       [] lo [] med [] hi   Temperature: [] lo [] med [] hi   [] Skin assessment post-treatment:  []intact []redness- no adverse reaction    []redness  adverse reaction:      min []Eval                  []Re-Eval       30 min Therapeutic Exercise:  [] See flow sheet :   Rationale: increase ROM, increase strength, improve coordination, improve balance and increase proprioception to improve the patients ability to safely and more independently perform ADLs     30 min Therapeutic Activity:  [x]  See flow sheet :ambulatory balance, static balance activities,          min Neuromuscular Re-education:  []  See flow sheet :        min Manual Therapy:                   With   [] TE   [] TA   [] neuro   [] other: Patient Education: [x] Review HEP    [] Progressed/Changed HEP based on:   [] positioning   [] body mechanics   [] transfers   [] heat/ice application    [] other:      Other Objective/Functional Measures:   LE strength  Hip flexion: 4-/5 bilaterally  Hip extension: 4/5 bilaterally  Hip adduction: 5/5 bilaterally  Hip abduction: 4+/5 bilaterally  Knee flexion: 4+/5 on right, 4-/5 on left  Knee extension: 4+/5 bilaterally  Ankle DF: 4+/5 bilaterally  Ankle PF: 4/5 on right, 4-/5 on left   Still walking with RW  Mild difficulty with bed transfers but still I  CS stiffness, no significant pain   strength testing: right 25#, left 33#     Pain Level (0-10 scale) post treatment: 0/10    ASSESSMENT/Changes in Function:   Pt required several seated rest breaks to complete session. During gait training, she required VCs to stay within the base of support of her rollator. She also required VCs to avoid obstacles and maintain upright posture. Patient will continue to benefit from skilled PT services to modify and progress therapeutic interventions, address functional mobility deficits and address ROM deficits to attain remaining goals.      [] See Plan of Care  [x] See progress note/recertification  [] See Discharge Summary      Progress towards goals / Updated goals:   Long Term Goals (To be achieved in 4 weeks)  1) Increase strength of bilateral LEs by 1/3 grade in order to improve ability to transfer from various surfaces to standing without assistance.   Status at initial evaluation: strength of LEs grossly 4-/5 to 5/5 bilateral LEs  Status at last progress note (3/13/17): residual LE weakness (4-/5) and difficulty performing sit to stand without use of hands, progressing  Current Status: Progressing ( Hip flexion: 4-/5 bilaterally, Hip extension: 4/5 bilaterally, Hip adduction: 5/5 bilaterally, Hip abduction: 4+/5 bilaterally, Knee flexion: 4+/5 on right, 4-/5 on left, Knee extension: 4+/5 bilaterally, Ankle DF: 4+/5 bilaterally, Ankle PF: 4/5 on right, 4-/5 on left) 4/10/17        2)Improved DHI score to <or= 30 points as evidence of improved dizziness with ADL  Status at Eval: [address] [place] [address] 56/100  Status at last progress note (3/13/17): [address] [place] [address] 46 points, progressing  Recertification: DHI 52, slight regression       3) Pt will be able to demonstrate ability to ambulate with least restrictive assistive device x 200 feet with SBA or better in order to mobilize more independently. Status at initial evaluation: pt ambulating with rollator with SBA  Status at last progress note (3/13/17): good endurance and ability to ambulate > 20 feet with CGA or rollator, reports fear of falling, unable to ambulate without assist, reports wall walking at home, progressing  Current Status: MET (pt able to ambulate 230 feet using a rollator and SBA) 4/10/17       4) Pt will increase strength of bilateral LEs to 4+/5 or better in order to normalize function, climb stairs, and allow pt to independently transfer from various surfaces to standing without assistance.   Status at initial evaluation: strength of LEs grossly 4-/5 to 5/5 bilateral LEs  Status at last progress note (3/13/17): increased ease with ADL and ambulation, LE strength progressing slowly, gross strength 4-/5, progressing, no further change  Current Status: Progressing ( Hip flexion: 4-/5 bilaterally, Hip extension: 4/5 bilaterally, Hip adduction: 5/5 bilaterally, Hip abduction: 4+/5 bilaterally, Knee flexion: 4+/5 on right, 4-/5 on left, Knee extension: 4+/5 bilaterally, Ankle DF: 4+/5 bilaterally, Ankle PF: 4/5 on right, 4-/5 on left) 4/10/17        5) FGA score will increase 4 points in order to indicate significant improvement in dynamic balance and decrease risk of falls. Status at initial evaluation: FGA: 10/30  Status at last progress note (3/13/17):  FGA unchanged, goal not met  Recert.: NT    PLAN  []  Upgrade activities as tolerated     [x]  Continue plan of care  []  Update interventions per flow sheet       []  Discharge due to:_  []  Other:_      Alexandra Montilla, PT 4/24/2017  2:36 PM    Future Appointments  Date Time Provider Niraj Light   4/27/2017 2:00 PM ASHU Jack THE [facility]

## 2017-04-27 ENCOUNTER — HOSPITAL ENCOUNTER (OUTPATIENT)
Dept: PHYSICAL THERAPY | Age: 82
Discharge: HOME | End: 2017-04-27
Payer: MEDICARE

## 2017-04-27 PROCEDURE — 97110 THERAPEUTIC EXERCISES: CPT

## 2017-04-27 NOTE — PROGRESS NOTES
PT DAILY TREATMENT NOTE - [facility]     Patient Name: Rob López  Date:2017  : 1925  [x]  Patient  Verified  Payor: VA MEDICARE / Plan: VA MEDICARE PART A & B / Product Type: Medicare /    In time:215  Out time:300  Total Treatment Time (min): 45  Total Timed Codes (min): 45  1:1 Treatment Time ([address] only): 39   Visit #: 20 of 20    Treatment Area: Dizziness [R42]  Weakness [R53.1]    SUBJECTIVE  Pain Level (0-10 scale): 0  Any medication changes, allergies to medications, adverse drug reactions, diagnosis change, or new procedure performed?: [x] No    [] Yes (see summary sheet for update)  Subjective functional status/changes:   [] No changes reported  Still low energy, feeling weak and easily fatigued    OBJECTIVE        45 min Therapeutic Exercise:  [] See flow sheet :   Rationale: increase ROM, increase strength and improve coordination           With   [] TE   [] TA   [] neuro   [] other: Patient Education: [x] Review HEP    [] Progressed/Changed HEP based on:   [] positioning   [] body mechanics   [] transfers   [] heat/ice application    [] other:      Other Objective/Functional Measures:   none     Pain Level (0-10 scale) post treatment: 0    ASSESSMENT/Changes in Function  No progress towards goals, constant verbal cuing for standing activities, difficulty coordinating movements. Chief c/o fatigue    Patient will continue to benefit from skilled PT services to modify and progress therapeutic interventions, address functional mobility deficits, address ROM deficits and address strength deficits to attain remaining goals. [x]  See Plan of Care  []  See progress note/recertification  []  See Discharge Summary         Progress towards goals / Updated goals:  Long Term Goals (To be achieved in 4 weeks)  1) Increase strength of bilateral LEs by 1/3 grade in order to improve ability to transfer from various surfaces to standing without assistance.   Status at initial evaluation: strength of LEs grossly 4-/5 to 5/5 bilateral LEs  Status at last progress note (3/13/17): residual LE weakness (4-/5) and difficulty performing sit to stand without use of hands, progressing  Current Status: Progressing ( Hip flexion: 4-/5 bilaterally, Hip extension: 4/5 bilaterally, Hip adduction: 5/5 bilaterally, Hip abduction: 4+/5 bilaterally, Knee flexion: 4+/5 on right, 4-/5 on left, Knee extension: 4+/5 bilaterally, Ankle DF: 4+/5 bilaterally, Ankle PF: 4/5 on right, 4-/5 on left) 4/10/17       2)Improved DHI score to <or= 30 points as evidence of improved dizziness with ADL  Status at Eval: [address] [place] [address] 56/100  Status at last progress note (3/13/17): [address] [place] [address] 46 points, progressing  Current status: not reassessed       3) Pt will be able to demonstrate ability to ambulate with least restrictive assistive device x 200 feet with SBA or better in order to mobilize more independently. Status at initial evaluation: pt ambulating with rollator with SBA  Status at last progress note (3/13/17): good endurance and ability to ambulate > 20 feet with CGA or rollator, reports fear of falling, unable to ambulate without assist, reports wall walking at home, progressing  Current Status: MET (pt able to ambulate 230 feet using a rollator and SBA) 4/10/17       4) Pt will increase strength of bilateral LEs to 4+/5 or better in order to normalize function, climb stairs, and allow pt to independently transfer from various surfaces to standing without assistance.   Status at initial evaluation: strength of LEs grossly 4-/5 to 5/5 bilateral LEs  Status at last progress note (3/13/17): increased ease with ADL and ambulation, LE strength progressing slowly, gross strength 4-/5, progressing, no further change  Current Status: Progressing ( Hip flexion: 4-/5 bilaterally, Hip extension: 4/5 bilaterally, Hip adduction: 5/5 bilaterally, Hip abduction: 4+/5 bilaterally, Knee flexion: 4+/5 on right, 4-/5 on left, Knee extension: 4+/5 bilaterally, Ankle DF: 4+/5 bilaterally, Ankle PF: 4/5 on right, 4-/5 on left) 4/10/17       5) FGA score will increase 4 points in order to indicate significant improvement in dynamic balance and decrease risk of falls. Status at initial evaluation: FGA: 10/30  Status at last progress note (3/13/17): FGA unchanged, goal not met  Current Status: not reassessed        PLAN  [x]  Upgrade activities as tolerated     [x]  Continue plan of care  []  Update interventions per flow sheet       []  Discharge due to:_  []  Other:_      Anila Asencio, ASHU 4/27/2017  3:38 PM    No future appointments.

## 2017-05-11 ENCOUNTER — HOSPITAL ENCOUNTER (OUTPATIENT)
Dept: PHYSICAL THERAPY | Age: 82
Discharge: HOME | End: 2017-05-11
Payer: MEDICARE

## 2017-05-11 PROCEDURE — 97164 PT RE-EVAL EST PLAN CARE: CPT

## 2017-05-11 PROCEDURE — G8979 MOBILITY GOAL STATUS: HCPCS

## 2017-05-11 PROCEDURE — 97530 THERAPEUTIC ACTIVITIES: CPT

## 2017-05-11 PROCEDURE — G8978 MOBILITY CURRENT STATUS: HCPCS

## 2017-05-11 NOTE — PROGRESS NOTES
PT DAILY TREATMENT NOTE - [facility]     Patient Name: Bashir Latham  Date:2017  : 1925  [x]  Patient  Verified  Payor: VA MEDICARE / Plan: VA MEDICARE PART A & B / Product Type: Medicare /    In time:233  Out time:348  Total Treatment Time (min): 75  Total Timed Codes (min): 60  1:1 Treatment Time ( [address] only): 30   Visit #: 21 of 20 + 21    Treatment Area: Dizziness [R42]  Weakness [R53.1]    SUBJECTIVE  Pain Level (0-10 scale): 0  Any medication changes, allergies to medications, adverse drug reactions, diagnosis change, or new procedure performed?: [x] No    [] Yes (see summary sheet for update)  Subjective functional status/changes:   [] No changes reported  Would like to continue with PT. Reports overall improvement, increased ease with ambulation and balance. Still needs to improve transfers, walking,   \"Nothing is easy\"    OBJECTIVE        30 min Therapeutic Exercise:  [x] See flow sheet :   Rationale: increase ROM, increase strength and improve coordination     15 min Therapeutic Activities including static and dynamic balance training, ankle strategies, gait activities, transfer training    15 min Reevaluation of function, strength, balance, FOTO          With   [] TE   [] TA   [] neuro   [] other: Patient Education: [x] Review HEP    [] Progressed/Changed HEP based on:   [] positioning   [] body mechanics   [] transfers   [] heat/ice application    [] other:      Other Objective/Functional Measures:   FOTO overall improved from 43 to 50/100  Walking with rollator, wall walks at home  FGA: NT due to deterioration of gait       Pain Level (0-10 scale) post treatment: 0    ASSESSMENT/Changes in Function  No progress towards goals, constant verbal cuing for standing activities, difficulty coordinating movements.  Chief c/o fatigue    Patient will continue to benefit from skilled PT services to modify and progress therapeutic interventions, address functional mobility deficits, address ROM deficits and address strength deficits to attain remaining goals. [x]  See Plan of Care  [x]  See progress note/recertification  []  See Discharge Summary         Progress towards goals / Updated goals:Mrs. Elmo Kamara has not been able to attend from 4/27/17 until 5/11/17 and is presenting with overall reduced ambulatory balance and subjective feeling of fatigue/weakness. She has met 1 of 6 LTG's and her self reported FOTO score has improved from 40 to 50/100 points indicating improved function. I recommend continuation of therapy to address residual deficits in strength and ambulatory balance but would highly recommend for her to attend therapy 2x weekly to assure functional progress. Long Term Goals (To be achieved in 4 weeks)  1) Increase strength of bilateral LEs by 1/3 grade in order to improve ability to transfer from various surfaces to standing without assistance. Status at initial evaluation: strength of LEs grossly 4-/5 to 5/5 bilateral LEs  Status at last progress note (3/13/17): residual LE weakness (4-/5) and difficulty performing sit to stand without use of hands, progressing  Current Status: Patient continues with functional LE strength deficit and difficulty with sit to stand transfers, improved performance post facilitation of improved movement pattern with transfer task, progressing       2)Improved DHI score to <or= 30 points as evidence of improved dizziness with ADL  Status at Eval: [facility] [address] 56/100  Status at last progress note (3/13/17): [facility] [address] 46 points, progressing  Current status: DHI 50, minimal improvement, sometimes spinning with sit to supine transfer, slowing down process helps, progressing      3) Pt will be able to demonstrate ability to ambulate with least restrictive assistive device x 200 feet with SBA or better in order to mobilize more independently.   Status at initial evaluation: pt ambulating with rollator with SBA  Status at last progress note (3/13/17): good endurance and ability to ambulate > 20 feet with CGA or rollator, reports fear of falling, unable to ambulate without assist, reports wall walking at home, progressing  Current Status: MET (pt able to ambulate 230 feet using a rollator and SBA) 4/10/17       4) Pt will increase strength of bilateral LEs to 4+/5 or better in order to normalize function, climb stairs, and allow pt to independently transfer from various surfaces to standing without assistance. Status at initial evaluation: strength of LEs grossly 4-/5 to 5/5 bilateral LEs  Status at last progress note (3/13/17): increased ease with ADL and ambulation, LE strength progressing slowly, gross strength 4-/5, progressing, no further change  Current Status: Progressing ( Hip flexion: 4-/5 bilaterally, Hip extension: 4/5 bilaterally, Hip adduction: 5/5 bilaterally, Hip abduction: 4+/5 bilaterally, Knee flexion: 4+/5 on right, 4-/5 on left, Knee extension: 4+/5 bilaterally, Ankle DF: 4+/5 bilaterally, Ankle PF: 4/5 on right, 4-/5 on left) 4/10/17  Current status: LE strength ranging from 4- to 5/5 , no significant progress      5) FGA score will increase 4 points (>or= 14/30) in order to indicate significant improvement in dynamic balance and decrease risk of falls. Status at initial evaluation: FGA: 10/30  Status at last progress note (3/13/17): FGA unchanged, goal not met  Current status:Not tested today due to patient being very unsteady, retest NV    6. Improved SLS ability to >or= 3/10 for increased ease with negotiation of stairs/curb, return to normal gait pattern with ambulation on stairs using HR   Current status as of 5/10/17:SLS 1 second, frequent LOB, no progress      PLAN  [x]  Upgrade activities as tolerated     [x]  Continue plan of care  []  Update interventions per flow sheet       []  Discharge due to:_  []  Other:_      Yvonne Vang, PT 5/11/2017  3:38 PM    Future Appointments  Date Time Provider Niraj Light   5/12/2017 1:30 [facility] 5/16/2017 2:[facility]   5/24/2017 2:30 PM DARYL Lyn THE [facility]   5/26/2017 2:00 PM DARYL Fried THE [facility]

## 2017-05-12 ENCOUNTER — HOSPITAL ENCOUNTER (OUTPATIENT)
Dept: PHYSICAL THERAPY | Age: 82
Discharge: HOME | End: 2017-05-12
Payer: MEDICARE

## 2017-05-12 PROCEDURE — 97110 THERAPEUTIC EXERCISES: CPT

## 2017-05-12 PROCEDURE — 97112 NEUROMUSCULAR REEDUCATION: CPT

## 2017-05-12 NOTE — PROGRESS NOTES
PT DAILY TREATMENT NOTE - [facility]    Patient Name: Daisy Centeno  Date:2017  : 1925  [x]  Patient  Verified  Payor: VA MEDICARE / Plan: VA MEDICARE PART A & B / Product Type: Medicare /    In time:1:28  Out time:2:32  Total Treatment Time (min): 64  Total Timed Codes (min): 64  1:1 Treatment Time ([facility] only): 50   Visit #: 21 of 21 +12 = 33    Treatment Area: Dizziness [R42]  Weakness [R53.1]    SUBJECTIVE  Pain Level (0-10 scale): 4/10  Any medication changes, allergies to medications, adverse drug reactions, diagnosis change, or new procedure performed?: [x] No    [] Yes (see summary sheet for update)  Subjective functional status/changes:   [] No changes reported  \"I fell kind of extra off today. It might be the weather. \"    OBJECTIVE      44 min Therapeutic Exercise:  [x] See flow sheet :   Rationale: increase ROM, increase strength and improve coordination to improve the patients ability to amb safely throughout daily activities. 20 min Neuromuscular Re-education:  [x]  See flow sheet :   Rationale: increase ROM, increase strength and improve coordination  to improve the patients ability to amb safely throughout daily activities. With   [] TE   [] TA   [] neuro   [] other: Patient Education: [x] Review HEP    [] Progressed/Changed HEP based on:   [] positioning   [] body mechanics   [] transfers   [] heat/ice application    [] other:      Other Objective/Functional Measures:      Pain Level (0-10 scale) post treatment: 0/10    ASSESSMENT/Changes in Function: Pt requires CGA and VC for proper sit-stand transfer strategies. Pt was able to amb with 180 feet with SPC and CGA. Pt was able to perform 3-point step through without VC. Pt reported greatly increased fatigue post tx.      Patient will continue to benefit from skilled PT services to modify and progress therapeutic interventions, address functional mobility deficits, address ROM deficits, address strength deficits, analyze and address soft tissue restrictions, analyze and cue movement patterns and analyze and modify body mechanics/ergonomics to attain remaining goals. []  See Plan of Care  []  See progress note/recertification  []  See Discharge Summary         Progress towards goals / Updated goals:  Long Term Goals (To be achieved in 4 weeks)  1) Increase strength of bilateral LEs by 1/3 grade in order to improve ability to transfer from various surfaces to standing without assistance. Status at initial evaluation: strength of LEs grossly 4-/5 to 5/5 bilateral LEs  Status at last progress note (3/13/17): residual LE weakness (4-/5) and difficulty performing sit to stand without use of hands, progressing  Current Status: Progressing ( Hip flexion: 4-/5 bilaterally, Hip extension: 4/5 bilaterally, Hip adduction: 5/5 bilaterally, Hip abduction: 4+/5 bilaterally, Knee flexion: 4+/5 on right, 4-/5 on left, Knee extension: 4+/5 bilaterally, Ankle DF: 4+/5 bilaterally, Ankle PF: 4/5 on right, 4-/5 on left) 4/10/17       2)Improved DHI score to <or= 30 points as evidence of improved dizziness with ADL  Status at Eval: [address] [place] [address] 56/100  Status at last progress note (3/13/17): [address] [place] [address] 46 points, progressing  Current status: not reassessed       3) Pt will be able to demonstrate ability to ambulate with least restrictive assistive device x 200 feet with SBA or better in order to mobilize more independently.   Status at initial evaluation: pt ambulating with rollator with SBA  Status at last progress note (3/13/17): good endurance and ability to ambulate > 20 feet with CGA or rollator, reports fear of falling, unable to ambulate without assist, reports wall walking at home, progressing  Current Status: MET (pt able to ambulate 230 feet using a rollator and SBA) 4/10/17       4) Pt will increase strength of bilateral LEs to 4+/5 or better in order to normalize function, climb stairs, and allow pt to independently transfer from various surfaces to standing without assistance. Status at initial evaluation: strength of LEs grossly 4-/5 to 5/5 bilateral LEs  Status at last progress note (3/13/17): increased ease with ADL and ambulation, LE strength progressing slowly, gross strength 4-/5, progressing, no further change  Current Status: Progressing ( Hip flexion: 4-/5 bilaterally, Hip extension: 4/5 bilaterally, Hip adduction: 5/5 bilaterally, Hip abduction: 4+/5 bilaterally, Knee flexion: 4+/5 on right, 4-/5 on left, Knee extension: 4+/5 bilaterally, Ankle DF: 4+/5 bilaterally, Ankle PF: 4/5 on right, 4-/5 on left) 4/10/17       5) FGA score will increase 4 points in order to indicate significant improvement in dynamic balance and decrease risk of falls. Status at initial evaluation: FGA: 10/30  Status at last progress note (3/13/17):  FGA unchanged, goal not met  Current Status: not reassessed     PLAN  []  Upgrade activities as tolerated     []  Continue plan of care  []  Update interventions per flow sheet       []  Discharge due to:_  []  Other:_      Roseline Salines 5/12/2017  2:43 PM

## 2017-05-16 ENCOUNTER — HOSPITAL ENCOUNTER (OUTPATIENT)
Dept: PHYSICAL THERAPY | Age: 82
Discharge: HOME | End: 2017-05-16
Payer: MEDICARE

## 2017-05-16 PROCEDURE — G8978 MOBILITY CURRENT STATUS: HCPCS

## 2017-05-16 PROCEDURE — G8979 MOBILITY GOAL STATUS: HCPCS

## 2017-05-16 PROCEDURE — 97530 THERAPEUTIC ACTIVITIES: CPT

## 2017-05-16 NOTE — PROGRESS NOTES
[facility]. Meghan Olivas, [address] [address]  Phone: [phone] Fax: [phone]    Continued Plan of Care/ Re-certification for Physical Therapy Services    Patient name: Teo Parikh Start of Care: 17   Referral source: Virginia Boudreaux MD : 1925   Medical/Treatment Diagnosis: Dizziness [R42]  Weakness [R53.1] Onset Date: 2016     Prior Hospitalization: see medical history Provider#: [id number]   Medications: Verified on Patient Summary List    Comorbidities: UTI, HTN, visual impairment, hearing impairment, fall risk  Prior Level of Function:ambulation with rollator, requires assistance with ADLs except bathing and dressing  Visits from Start of Care: 23    Missed Visits: 3    The Plan of Care and following information is based on the patient's current status:  Long Term Goals (To be achieved in 4 weeks)  1) Increase strength of bilateral LEs by 1/3 grade in order to improve ability to transfer from various surfaces to standing without assistance. Status at initial evaluation: strength of LEs grossly 4-/5 to 5/5 bilateral LEs  Status at last progress note (3/13/17): residual LE weakness (4-/5) and difficulty performing sit to stand without use of hands, progressing  Current Status: Progressing ( Hip flexion: 4-/5 bilaterally, Hip extension: 4/5 bilaterally, Hip adduction: 5/5 bilaterally, Hip abduction: 4+/5 bilaterally, Knee flexion: 4+/5 on right, 4-/5 on left, Knee extension: 4+/5 bilaterally, Ankle DF: 4+/5 bilaterally, Ankle PF: 4/5 on right, 4-/5 on left)      2)Improved DHI score to <or= 30 points as evidence of improved dizziness with ADL  Status at Eval: [address] [place] [address] 56/100  Status at last progress note (3/13/17): [address] [place] [address] 46 points, progressing  Current status: not reassessed       3) Pt will be able to demonstrate ability to ambulate with least restrictive assistive device x 200 feet with SBA or better in order to mobilize more independently.   Status at initial evaluation: pt ambulating with rollator with SBA  Status at last progress note (3/13/17): good endurance and ability to ambulate > 20 feet with CGA or rollator, reports fear of falling, unable to ambulate without assist, reports wall walking at home, progressing  Current Status: MET (pt able to ambulate 230 feet using a rollator and SBA)      4) Pt will increase strength of bilateral LEs to 4+/5 or better in order to normalize function, climb stairs, and allow pt to independently transfer from various surfaces to standing without assistance. Status at initial evaluation: strength of LEs grossly 4-/5 to 5/5 bilateral LEs  Status at last progress note (3/13/17): increased ease with ADL and ambulation, LE strength progressing slowly, gross strength 4-/5, progressing, no further change  Current Status: Progressing ( Hip flexion: 4-/5 bilaterally, Hip extension: 4/5 bilaterally, Hip adduction: 5/5 bilaterally, Hip abduction: 4+/5 bilaterally, Knee flexion: 4+/5 on right, 4-/5 on left, Knee extension: 4+/5 bilaterally, Ankle DF: 4+/5 bilaterally, Ankle PF: 4/5 on right, 4-/5 on left)      5) FGA score will increase 4 points in order to indicate significant improvement in dynamic balance and decrease risk of falls. Status at initial evaluation: FGA: 10/30  Status at last progress note (3/13/17): FGA unchanged, goal not met  Current Status: FGA: 11/30 (improved 1 point since [facility])     Key functional changes:    Pt's FGA score improved only 1 point since [facility]. Pt reported feeling increased fatigue today at start of treatment. Pt displayed 3-4 episodes of sudden stopping of LEs when ambulating. Pt's progress has been very slow due to pt attending only 3 PT treatments over the past 4 weeks. Pt has been reporting increased difficulty ambulating with increased fatigue over the past 4 weeks. Pt has shown little improvement requiring constant verbal cuing for standing activities, difficulty coordinating movements. Problems/ barriers to goal attainment: lack of consistent attendance     Problem List: impaired gait/ balance, decrease ADL/ functional abilitiies, decrease activity tolerance, decrease flexibility/ joint mobility and decrease transfer abilities     Treatment Plan: Therapeutic exercise, Therapeutic activities, Neuromuscular re-education, Physical agent/modality, Gait/balance training, Manual therapy and Patient education     Patient Goal (s) has been updated and includes: \"I want to be able to walk and function without fear of falling. \"     Goals for this certification period to be accomplished in 4 weeks:   1) Continue with unmet goals above. Frequency / Duration: Patient to be seen 2 times per week for 4 weeks:    Assessment / Recommendations:   Pt may benefit from additional skilled PT emphasizing compliance with PT attendance. G-Codes (GP)  Mobility  [id number] Current  CK= 40-59%  [id number] Goal  CJ= 20-39%  The severity rating is based on clinical judgment and the FOTO score. Certification Period: 5/17/17 to 6/15/17    Geena Eastman, PT 5/16/2017 6:48 PM    ________________________________________________________________________  I certify that the above Therapy Services are being furnished while the patient is under my care. I agree with the treatment plan and certify that this therapy is necessary. [] I have read the above and request that my patient continue as recommended. [] I have read the above report and request that my patient continue therapy with the following changes/special instructions: _______________________________________  [] I have read the above report and request that my patient be discharged from therapy    Physician's Signature:________________________________Date:___________Time:__________    Please sign and return to [facility].  Gwendalyn [address]  Phone: [phone] Fax: [phone]

## 2017-05-16 NOTE — PROGRESS NOTES
PT DAILY TREATMENT NOTE - [facility]     Patient Name: Sadiq Denson  Date:2017  : 1925  [x]  Patient  Verified  [place]or: [place] MED[place] / Plan: [place] MEDICARE PART A & B / Product Type: Medicare /    In time:2:38  Out time:3:15  Total Treatment Time (min): 37  Total Timed Codes (min): 37  1:1 Treatment Time ( [address] only): 40   Visit #: 22 of 33    Treatment Area: Dizziness [R42]  Weakness [R53.1]    SUBJECTIVE  Pain Level (0-10 scale): 0/10  Any medication changes, allergies to medications, adverse drug reactions, diagnosis change, or new procedure performed?: [x] No    [] Yes (see summary sheet for update)  Subjective functional status/changes:   [] No changes reported  \"I'm feeling really tired and I forgot my hearing aids. \"    OBJECTIVE    Modality rationale:    Min Type Additional Details    [] Estim:  []Unatt       []IFC  []Premod                        []Other:  []w/ice   []w/heat  Position:  Location:    [] Estim: []Att    []TENS instruct  []NMES                    []Other:  []w/US   []w/ice   []w/heat  Position:  Location:    []  Traction: [] Cervical       []Lumbar                       [] Prone          []Supine                       []Intermittent   []Continuous Lbs:  [] before manual  [] after manual    []  Ultrasound: []Continuous   [] Pulsed                           []1MHz   []3MHz W/cm2:  Location:    []  Iontophoresis with dexamethasone         Location: [] Take home patch   [] In clinic    []  Ice     []  heat  []  Ice massage  []  Laser   []  Anodyne Position:  Location:    []  Laser with stim  []  Other:  Position:  Location:    []  Vasopneumatic Device Pressure:       [] lo [] med [] hi   Temperature: [] lo [] med [] hi   [] Skin assessment post-treatment:  []intact []redness- no adverse reaction    []redness  adverse reaction:      min []Eval                  []Re-Eval        min Therapeutic Exercise:  [x] See flow sheet :       37 min Therapeutic Activity:  []  See flow sheet :  FGA Rationale: increase strength, improve coordination, improve balance and increase proprioception to improve the patients ability to mobilize independently and safely. min Neuromuscular Re-education:  []  See flow sheet :        min Manual Therapy:          min Gait Training:  ___ feet with ___ device on level surfaces with ___ level of assist   Rationale: With   [] TE   [] TA   [] neuro   [] other: Patient Education: [x] Review HEP    [] Progressed/Changed HEP based on:   [] positioning   [] body mechanics   [] transfers   [] heat/ice application    [] other:      Other Objective/Functional Measures:   FGA: 11/30     Pain Level (0-10 scale) post treatment: 0/10    ASSESSMENT/Changes in Function:    Pt's FGA score improved only 1 point since [facility]. Pt reported feeling increased fatigue today at start of treatment. Pt displayed 3-4 episodes of sudden stopping of LEs when ambulating. Pt's progress has been very slow due to pt attending only 3 PT treatments over the past 4 weeks. Pt has been reporting increased difficulty ambulating with increased fatigue over the past 4 weeks. Pt has shown little improvement requiring constant verbal cuing for standing activities, difficulty coordinating movements. Pt may benefit from additional skilled PT emphasizing compliance with PT attendance. Patient will continue to benefit from skilled PT services to modify and progress therapeutic interventions, address functional mobility deficits, address ROM deficits, address strength deficits, analyze and address soft tissue restrictions, analyze and cue movement patterns and analyze and modify body mechanics/ergonomics to attain remaining goals.      []  See Plan of Care  []  See progress note/recertification  []  See Discharge Summary         Progress towards goals / Updated goals:  Long Term Goals (To be achieved in 4 weeks)  1) Increase strength of bilateral LEs by 1/3 grade in order to improve ability to transfer from various surfaces to standing without assistance. Status at initial evaluation: strength of LEs grossly 4-/5 to 5/5 bilateral LEs  Status at last progress note (3/13/17): residual LE weakness (4-/5) and difficulty performing sit to stand without use of hands, progressing  Current Status: Progressing ( Hip flexion: 4-/5 bilaterally, Hip extension: 4/5 bilaterally, Hip adduction: 5/5 bilaterally, Hip abduction: 4+/5 bilaterally, Knee flexion: 4+/5 on right, 4-/5 on left, Knee extension: 4+/5 bilaterally, Ankle DF: 4+/5 bilaterally, Ankle PF: 4/5 on right, 4-/5 on left) 4/10/17       2)Improved DHI score to <or= 30 points as evidence of improved dizziness with ADL  Status at Eval: [address] [place] [address] 56/100  Status at last progress note (3/13/17): [address] [place] [address] 46 points, progressing  Current status: not reassessed       3) Pt will be able to demonstrate ability to ambulate with least restrictive assistive device x 200 feet with SBA or better in order to mobilize more independently. Status at initial evaluation: pt ambulating with rollator with SBA  Status at last progress note (3/13/17): good endurance and ability to ambulate > 20 feet with CGA or rollator, reports fear of falling, unable to ambulate without assist, reports wall walking at home, progressing  Current Status: MET (pt able to ambulate 230 feet using a rollator and SBA) 4/10/17       4) Pt will increase strength of bilateral LEs to 4+/5 or better in order to normalize function, climb stairs, and allow pt to independently transfer from various surfaces to standing without assistance.   Status at initial evaluation: strength of LEs grossly 4-/5 to 5/5 bilateral LEs  Status at last progress note (3/13/17): increased ease with ADL and ambulation, LE strength progressing slowly, gross strength 4-/5, progressing, no further change  Current Status: Progressing ( Hip flexion: 4-/5 bilaterally, Hip extension: 4/5 bilaterally, Hip adduction: 5/5 bilaterally, Hip abduction: 4+/5 bilaterally, Knee flexion: 4+/5 on right, 4-/5 on left, Knee extension: 4+/5 bilaterally, Ankle DF: 4+/5 bilaterally, Ankle PF: 4/5 on right, 4-/5 on left) 4/10/17       5) FGA score will increase 4 points in order to indicate significant improvement in dynamic balance and decrease risk of falls. Status at initial evaluation: FGA: 10/30  Status at last progress note (3/13/17): FGA unchanged, goal not met  Current Status: FGA: 11/30 (improved 1 point since [facility]) 5/16/17    PLAN  []  Upgrade activities as tolerated     []  Continue plan of care  []  Update interventions per flow sheet       [x]  Discharge due to: lack of significant progress.   []  Other:_      Priscila Olivares PT 5/16/2017  6:26 PM    Future Appointments  Date Time Provider Niraj Light   5/24/2017 2:30 PM Milly Briceter, [address] [place] [address] [facility]   5/26/2017 2:00 PM Priscila Olivares PT [facility]   5/30/2017 2:30 PM Priscila Olivares PT [facility]   6/1/2017 2:00 PM Amanda guzman, PT [facility]   6/2/2017 2:00 PM DARYL Aguir[facility]

## 2017-05-24 ENCOUNTER — HOSPITAL ENCOUNTER (OUTPATIENT)
Dept: PHYSICAL THERAPY | Age: 82
Discharge: HOME | End: 2017-05-24
Payer: MEDICARE

## 2017-05-24 PROCEDURE — 97530 THERAPEUTIC ACTIVITIES: CPT

## 2017-05-24 NOTE — PROGRESS NOTES
PT DAILY TREATMENT NOTE - [facility] 3-16    Patient Name: Nader Hirsch  Date:2017  : 1925  [x]  Patient  Verified  Payor: [place] MEDICARE / Plan: [place] MEDICARE PART A & B / Product Type: Medicare /    In time:235  Out time:315  Total Treatment Time (min): 40  Total Timed Codes (min): 40  1:1 Treatment Time ( [address] only): 40   Visit #: 24 of 21 +12 = 33    Treatment Area: Dizziness [R42]  Weakness [R53.1]    SUBJECTIVE  Pain Level (0-10 scale): 0/10  Any medication changes, allergies to medications, adverse drug reactions, diagnosis change, or new procedure performed?: [x] No    [] Yes (see summary sheet for update)  Subjective functional status/changes:   [] No changes reported  Feeling good. Did not want to comi in today. Feeling tired from the flight. OBJECTIVE      15 min Therapeutic Exercise:  [x] See flow sheet :   Rationale: increase ROM, increase strength and improve coordination to improve the patients ability to amb safely throughout daily activities. 25 min Therapeutic Activities:  [x]  See flow sheet :static and dynamic balance activities   Rationale: increase ROM, increase strength and improve coordination  to improve the patients ability to amb safely throughout daily activities.            With   [] TE   [] TA   [] neuro   [] other: Patient Education: [x] Review HEP    [] Progressed/Changed HEP based on:   [] positioning   [] body mechanics   [] transfers   [] heat/ice application    [] other:      Other Objective/Functional Measures:   Patient needs guidance with all TA  Deficit in ambulatory balance  Needs reminders for postural correction     Pain Level (0-10 scale) post treatment: 0/10    ASSESSMENT/Changes in Function: fatigue with activities, motivated    Patient will continue to benefit from skilled PT services to modify and progress therapeutic interventions, address functional mobility deficits, address ROM deficits, address strength deficits, analyze and address soft tissue restrictions, analyze and cue movement patterns and analyze and modify body mechanics/ergonomics to attain remaining goals. [x]  See Plan of Care  []  See progress note/recertification  []  See Discharge Summary         Progress towards goals / Updated goals:  Long Term Goals (To be achieved in 4 weeks)  1) Increase strength of bilateral LEs by 1/3 grade in order to improve ability to transfer from various surfaces to standing without assistance. Status at initial evaluation: strength of LEs grossly 4-/5 to 5/5 bilateral LEs  Status at last progress note (3/13/17): residual LE weakness (4-/5) and difficulty performing sit to stand without use of hands, progressing  Current Status: Progressing ( Hip flexion: 4-/5 bilaterally, Hip extension: 4/5 bilaterally, Hip adduction: 5/5 bilaterally, Hip abduction: 4+/5 bilaterally, Knee flexion: 4+/5 on right, 4-/5 on left, Knee extension: 4+/5 bilaterally, Ankle DF: 4+/5 bilaterally, Ankle PF: 4/5 on right, 4-/5 on left) 4/10/17       2)Improved DHI score to <or= 30 points as evidence of improved dizziness with ADL  Status at Eval: [address] [place] [address] 56/100  Status at last progress note (3/13/17): [address] [place] [address] 46 points, progressing  Current status: 50 points, fluctuating      3) Pt will be able to demonstrate ability to ambulate with least restrictive assistive device x 200 feet with SBA or better in order to mobilize more independently.   Status at initial evaluation: pt ambulating with rollator with SBA  Status at last progress note (3/13/17): good endurance and ability to ambulate > 20 feet with CGA or rollator, reports fear of falling, unable to ambulate without assist, reports wall walking at home, progressing  Current Status: MET (pt able to ambulate 230 feet using a rollator and SBA) 4/10/17       4) Pt will increase strength of bilateral LEs to 4+/5 or better in order to normalize function, climb stairs, and allow pt to independently transfer from various surfaces to standing without assistance. Status at initial evaluation: strength of LEs grossly 4-/5 to 5/5 bilateral LEs  Status at last progress note (3/13/17): increased ease with ADL and ambulation, LE strength progressing slowly, gross strength 4-/5, progressing, no further change  Current Status: Progressing ( Hip flexion: 4-/5 bilaterally, Hip extension: 4/5 bilaterally, Hip adduction: 5/5 bilaterally, Hip abduction: 4+/5 bilaterally, Knee flexion: 4+/5 on right, 4-/5 on left, Knee extension: 4+/5 bilaterally, Ankle DF: 4+/5 bilaterally, Ankle PF: 4/5 on right, 4-/5 on left) 4/10/17       5) FGA score will increase 4 points in order to indicate significant improvement in dynamic balance and decrease risk of falls. Status at initial evaluation: FGA: 10/30  Status at last progress note (3/13/17):  FGA unchanged, goal not met  Current Status: 11/30, slow progress    PLAN  []  Upgrade activities as tolerated     [x]  Continue plan of care  []  Update interventions per flow sheet       []  Discharge due to:_  []  Other:_      Pham Bian, PT 5/24/2017  2:43 PM

## 2017-05-26 ENCOUNTER — HOSPITAL ENCOUNTER (OUTPATIENT)
Dept: PHYSICAL THERAPY | Age: 82
Discharge: HOME | End: 2017-05-26
Payer: MEDICARE

## 2017-05-26 PROCEDURE — 97110 THERAPEUTIC EXERCISES: CPT

## 2017-05-26 NOTE — PROGRESS NOTES
PT DAILY TREATMENT NOTE - [facility]     Patient Name: Teo Parikh  Date:2017  : 1925  [x]  Patient  Verified  Payor: Angelina Car / Plan: [place] MEDICARE PART A & B / Product Type: Medicare /    In time:2:05  Out time:2:55  Total Treatment Time (min): 50  Total Timed Codes (min): 50  1:1 Treatment Time ( [address] only): 50   Visit #: 25 of 30    Treatment Area: Dizziness [R42]  Weakness [R53.1]    SUBJECTIVE  Pain Level (0-10 scale): 0/10  Any medication changes, allergies to medications, adverse drug reactions, diagnosis change, or new procedure performed?: [x] No    [] Yes (see summary sheet for update)  Subjective functional status/changes:   [] No changes reported  \"I just got back from being away visiting family and will remain here until  then OI will be leaving again for 5 days. \"    OBJECTIVE    Modality rationale:    Min Type Additional Details    [] Estim:  []Unatt       []IFC  []Premod                        []Other:  []w/ice   []w/heat  Position:  Location:    [] Estim: []Att    []TENS instruct  []NMES                    []Other:  []w/US   []w/ice   []w/heat  Position:  Location:    []  Traction: [] Cervical       []Lumbar                       [] Prone          []Supine                       []Intermittent   []Continuous Lbs:  [] before manual  [] after manual    []  Ultrasound: []Continuous   [] Pulsed                           []1MHz   []3MHz W/cm2:  Location:    []  Iontophoresis with dexamethasone         Location: [] Take home patch   [] In clinic    []  Ice     []  heat  []  Ice massage  []  Laser   []  Anodyne Position:  Location:    []  Laser with stim  []  Other:  Position:  Location:    []  Vasopneumatic Device Pressure:       [] lo [] med [] hi   Temperature: [] lo [] med [] hi   [] Skin assessment post-treatment:  []intact []redness- no adverse reaction    []redness  adverse reaction:      min []Eval                  []Re-Eval       50 min Therapeutic Exercise:  [x] See flow sheet :  Increased laod of leg press to 25 lbs. Rationale: increase strength, improve coordination, improve balance and increase proprioception to improve the patients ability to mobilize independently and safely. min Therapeutic Activity:  []  See flow sheet :         min Neuromuscular Re-education:  []  See flow sheet :        min Manual Therapy:          min Gait Training:  ___ feet with ___ device on level surfaces with ___ level of assist   Rationale: With   [] TE   [] TA   [] neuro   [] other: Patient Education: [x] Review HEP    [] Progressed/Changed HEP based on:   [] positioning   [] body mechanics   [] transfers   [] heat/ice application    [] other:      Other Objective/Functional Measures:    Pt mobilizes with RW slowly and transfers with HHA. Pt's movements are very slow today. Pain Level (0-10 scale) post treatment: 0/10    ASSESSMENT/Changes in Function:    Pt requires close supervision to properly perform exercises. Pt requires hand hold assist for transfers from RW to exercise machine. Pt performs exercises very slowly. Patient will continue to benefit from skilled PT services to modify and progress therapeutic interventions, address functional mobility deficits, address ROM deficits, address strength deficits, analyze and address soft tissue restrictions, analyze and cue movement patterns and analyze and modify body mechanics/ergonomics to attain remaining goals. []  See Plan of Care  []  See progress note/recertification  []  See Discharge Summary         Progress towards goals / Updated goals:  Long Term Goals (To be achieved in 4 weeks)  1) Increase strength of bilateral LEs by 1/3 grade in order to improve ability to transfer from various surfaces to standing without assistance.   Status at initial evaluation: strength of LEs grossly 4-/5 to 5/5 bilateral LEs  Status at last progress note (5/17/17): Progressing ( Hip flexion: 4-/5 bilaterally, Hip extension: 4/5 bilaterally, Hip adduction: 5/5 bilaterally, Hip abduction: 4+/5 bilaterally, Knee flexion: 4+/5 on right, 4-/5 on left, Knee extension: 4+/5 bilaterally, Ankle DF: 4+/5 bilaterally, Ankle PF: 4/5 on right, 4-/5 on left)  Current status: not reassessed      2)Improved DHI score to <or= 30 points as evidence of improved dizziness with ADL  Status at Eval: [address] [place] [address] 56/100  Status at last progress note (5/17/17): [address] [place] [address] 46 points, progressing  Current status: not reassessed       3) Pt will be able to demonstrate ability to ambulate with least restrictive assistive device x 200 feet with SBA or better in order to mobilize more independently. Status at initial evaluation: pt ambulating with rollator with SBA  Status at last progress note (5/17/17): MET (pt able to ambulate 230 feet using a rollator and SBA)  Current status: not reassessed       4) Pt will increase strength of bilateral LEs to 4+/5 or better in order to normalize function, climb stairs, and allow pt to independently transfer from various surfaces to standing without assistance. Status at initial evaluation: strength of LEs grossly 4-/5 to 5/5 bilateral LEs  Status at last progress note (5/17/17): Progressing ( Hip flexion: 4-/5 bilaterally, Hip extension: 4/5 bilaterally, Hip adduction: 5/5 bilaterally, Hip abduction: 4+/5 bilaterally, Knee flexion: 4+/5 on right, 4-/5 on left, Knee extension: 4+/5 bilaterally, Ankle DF: 4+/5 bilaterally, Ankle PF: 4/5 on right, 4-/5 on left)  Current status: not reassessed      5) FGA score will increase 4 points in order to indicate significant improvement in dynamic balance and decrease risk of falls. Status at initial evaluation: FGA: 10/30  Status at last progress note (5/17/17):  FGA: 11/30 (improved 1 point since [facility])  Current status: not reassessed    PLAN  []  Upgrade activities as tolerated     [x]  Continue plan of care  []  Update interventions per flow sheet       []  Discharge due to:_  []  Other:_      Kristopher Bansal Hakeem Kurtz PT 5/26/2017  2:54 PM    Future Appointments  Date Time Provider Niraj Light   5/30/2017 2:30 PM DARYL Keys [facility]   6/1/2017 2:00 PM [place], [place] KENNETH [facility]   6/2/2017 2:00 PM DARYL KeysHPBAM [facility]

## 2017-05-30 ENCOUNTER — HOSPITAL ENCOUNTER (OUTPATIENT)
Dept: PHYSICAL THERAPY | Age: 82
Discharge: HOME | End: 2017-05-30
Payer: MEDICARE

## 2017-05-30 PROCEDURE — 97110 THERAPEUTIC EXERCISES: CPT

## 2017-05-30 NOTE — PROGRESS NOTES
[facility] in [address] [place], [address] [place]  Phone: [phone]      Fax:  [phone]    Medicare Progress Report         Patient name: Marjorie Thakur Start of Care: 17   Referral source: Tressa Laurent MD : 1925   Medical/Treatment Diagnosis: Dizziness [R42]  Weakness [R53.1] Onset Date: 2016   Prior Hospitalization: see medical history Provider#: [id number]   Medications: Verified on Patient Summary List     Comorbidities: UTI, HTN, visual impairment, hearing impairment, fall risk  Prior Level of Function:ambulation with rollator, requires assistance with ADLs except bathing and dressing      Visits from Start of Care: 21    Missed Visits: 3  Reporting Period : 3/12/17 to 17    Subjective Reports: I am feeling weak                            Progress towards goals / Updated goals:Mrs. Daniel Tilley has not been able to attend from 17 until 17 and is presenting with overall reduced ambulatory balance and subjective feeling of fatigue/weakness. She has met 1 of 6 LTG's and her self reported FOTO score has improved from 40 to 50/100 points indicating improved function. I recommend continuation of therapy to address residual deficits in strength and ambulatory balance but would highly recommend for her to attend therapy 2x weekly to assure functional progress. Long Term Goals (To be achieved in 4 weeks)  1) Increase strength of bilateral LEs by 1/3 grade in order to improve ability to transfer from various surfaces to standing without assistance.   Status at initial evaluation: strength of LEs grossly 4-/5 to 5/5 bilateral LEs  Status at last progress note (3/13/17): residual LE weakness (4-/5) and difficulty performing sit to stand without use of hands, progressing  Current Status: Patient continues with functional LE strength deficit and difficulty with sit to stand transfers, improved performance post facilitation of improved movement pattern with transfer task, progressing       2)Improved DHI score to <or= 30 points as evidence of improved dizziness with ADL  Status at Eval: [facility] [address] 56/100  Status at last progress note (3/13/17): [facility] [address] 46 points, progressing  Current status: DHI 50, minimal improvement, sometimes spinning with sit to supine transfer, slowing down process helps, progressing      3) Pt will be able to demonstrate ability to ambulate with least restrictive assistive device x 200 feet with SBA or better in order to mobilize more independently. Status at initial evaluation: pt ambulating with rollator with SBA  Status at last progress note (3/13/17): good endurance and ability to ambulate > 20 feet with CGA or rollator, reports fear of falling, unable to ambulate without assist, reports wall walking at home, progressing  Current Status: MET (pt able to ambulate 230 feet using a rollator and SBA) 4/10/17       4) Pt will increase strength of bilateral LEs to 4+/5 or better in order to normalize function, climb stairs, and allow pt to independently transfer from various surfaces to standing without assistance. Status at initial evaluation: strength of LEs grossly 4-/5 to 5/5 bilateral LEs  Status at last progress note (3/13/17): increased ease with ADL and ambulation, LE strength progressing slowly, gross strength 4-/5, progressing, no further change  Current Status: Progressing ( Hip flexion: 4-/5 bilaterally, Hip extension: 4/5 bilaterally, Hip adduction: 5/5 bilaterally, Hip abduction: 4+/5 bilaterally, Knee flexion: 4+/5 on right, 4-/5 on left, Knee extension: 4+/5 bilaterally, Ankle DF: 4+/5 bilaterally, Ankle PF: 4/5 on right, 4-/5 on left) 4/10/17  Current status: LE strength ranging from 4- to 5/5 , no significant progress      5) FGA score will increase 4 points (>or= 14/30) in order to indicate significant improvement in dynamic balance and decrease risk of falls.   Status at initial evaluation: FGA: 10/30  Status at last progress note (3/13/17): FGA unchanged, goal not met  Current status:Not tested today due to patient being very unsteady, retest NV     6.Improved SLS ability to >or= 3/10 for increased ease with negotiation of stairs/curb, return to normal gait pattern with ambulation on stairs using HR   Current status as of 5/10/17:SLS 1 second, frequent LOB, no progress  Key functional changes: regression in ambulatory function likely due to gap in therapy      Problems/ barriers to goal attainment: lapse in therapy     Assessment / Recommendations:continue with present treatment with regular biweekly therapy sessions    Problem List: decrease strength, impaired gait/ balance, decrease ADL/ functional abilitiies, decrease activity tolerance, decrease flexibility/ joint mobility and decrease transfer abilities   Treatment Plan: Therapeutic exercise, Therapeutic activities, Neuromuscular re-education, Physical agent/modality, Gait/balance training, Manual therapy and Patient education        Updated Goals to be accomplished in 6 weeks:  Remaining goals as above    Frequency / Duration: Patient to be seen 2 times per week for 6 weeks:    G-Codes (GP)  Mobility  [id number] Current  CK= 40-59%   Goal  CJ= 20-39%  Position    Carry    Self Care      The severity rating is based on clinical judgement and the FOTO score.       Trenton Dhillon, PT 5/30/2017 12:03 AM

## 2017-05-30 NOTE — PROGRESS NOTES
[facility] in [address] [place], [facility] [address]  Phone: [phone]      Fax:  [phone]    Continued Plan of Care/ Re-certification for Physical Therapy Services    Patient name: Misty Willis Start of Care: 17   Referral source: Michelle Houston MD : 1925   Medical/Treatment Diagnosis: Dizziness [R42]  Weakness [R53.1] Onset Date: 2016   Prior Hospitalization: see medical history Provider#: [id number]   Medications: Verified on Patient Summary List      Comorbidities: UTI, HTN, visual impairment, hearing impairment, fall risk  Prior Level of Function:ambulation with rollator, requires assistance with ADLs except bathing and dressing      Visits from Start of Care: 19    Missed Visits: 2    The Plan of Care and following information is based on the patient's current status:      Progress towards goals / Updated goals:Mrs. Shaunna Napier has been showing slow progress. She continues with marked deficit in general strength, ambulatory balance and overall function. I recommend continuation of present treatment with focus on ambulatory balance. Long Term Goals (To be achieved in 4 weeks)  1) Increase strength of bilateral LEs by 1/3 grade in order to improve ability to transfer from various surfaces to standing without assistance.   Status at initial evaluation: strength of LEs grossly 4-/5 to 5/5 bilateral LEs  Status at last progress note (3/13/17): residual LE weakness (4-/5) and difficulty performing sit to stand without use of hands, progressing  Current Status: Progressing ( Hip flexion: 4-/5 bilaterally, Hip extension: 4/5 bilaterally, Hip adduction: 5/5 bilaterally, Hip abduction: 4+/5 bilaterally, Knee flexion: 4+/5 on right, 4-/5 on left, Knee extension: 4+/5 bilaterally, Ankle DF: 4+/5 bilaterally, Ankle PF: 4/5 on right, 4-/5 on left) 4/10/17        2)Improved DHI score to <or= 30 points as evidence of improved dizziness with ADL  Status at Eval: [facility] 56/100  Status at last progress note (3/13/17): [address] 46 points, progressing  Recertification: DHI 52, slight regression       3) Pt will be able to demonstrate ability to ambulate with least restrictive assistive device x 200 feet with SBA or better in order to mobilize more independently. Status at initial evaluation: pt ambulating with rollator with SBA  Status at last progress note (3/13/17): good endurance and ability to ambulate > 20 feet with CGA or rollator, reports fear of falling, unable to ambulate without assist, reports wall walking at home, progressing  Current Status: MET (pt able to ambulate 230 feet using a rollator and SBA) 4/10/17       4) Pt will increase strength of bilateral LEs to 4+/5 or better in order to normalize function, climb stairs, and allow pt to independently transfer from various surfaces to standing without assistance. Status at initial evaluation: strength of LEs grossly 4-/5 to 5/5 bilateral LEs  Status at last progress note (3/13/17): increased ease with ADL and ambulation, LE strength progressing slowly, gross strength 4-/5, progressing, no further change  Current Status: Progressing ( Hip flexion: 4-/5 bilaterally, Hip extension: 4/5 bilaterally, Hip adduction: 5/5 bilaterally, Hip abduction: 4+/5 bilaterally, Knee flexion: 4+/5 on right, 4-/5 on left, Knee extension: 4+/5 bilaterally, Ankle DF: 4+/5 bilaterally, Ankle PF: 4/5 on right, 4-/5 on left) 4/10/17        5) FGA score will increase 4 points in order to indicate significant improvement in dynamic balance and decrease risk of falls. Status at initial evaluation: FGA: 10/30  Status at last progress note (3/13/17):  FGA unchanged, goal not met  Recert.: NT  Key functional changes: fluctuating ambulatory balance      Problems/ barriers to goal attainment: deficit in general strength, ambulatory balance     Problem List: decrease strength, impaired gait/ balance, decrease ADL/ functional abilitiies, decrease activity tolerance, decrease flexibility/ joint mobility and decrease transfer abilities    Treatment Plan: Therapeutic exercise, Therapeutic activities, Neuromuscular re-education, Physical agent/modality, Gait/balance training, Manual therapy and Patient education   2    Frequency / Duration: Patient to be seen 2 times per week for 4 weeks:    Assessment / Recommendations:continue with present treatment to improve functional ambulation and reduce fall risk    G-Codes (GP)  Mobility  [id number] Current  CK= 40-59%   Goal  CJ= 20-39%  Position    Carry    Self Care      The severity rating is based on clinical judgment and the FOTO score. Certification Period: 4/18/17-5/17/17    Michelene Councilman, PT 5/30/2017 12:23 AM    ________________________________________________________________________  I certify that the above Therapy Services are being furnished while the patient is under my care. I agree with the treatment plan and certify that this therapy is necessary. [] I have read the above and request that my patient continue as recommended. [] I have read the above report and request that my patient continue therapy with the following changes/special instructions: ______________________________________  [] I have read the above report and request that my patient be discharged from therapy    Physician's Signature:_______________________________Date:___________Time:__________    Please sign and return to   [facility] in [address]  [place] [address]  Phone: [phone]      Fax:  [phone]

## 2017-05-30 NOTE — PROGRESS NOTES
PT DAILY TREATMENT NOTE - [facility]     Patient Name: Roberto Falcon  Date:2017  : 1925  [x]  Patient  Verified  Payor: [place] MEDICARE / Plan: [place] MEDICARE PART A & B / Product Type: Medicare /    In time:2:35  Out time:3:45  Total Treatment Time (min): 70  Total Timed Codes (min): 70  1:1 Treatment Time ([address] only): 23   Visit #: 26 of 30    Treatment Area: Dizziness [R42]  Weakness [R53.1]    SUBJECTIVE  Pain Level (0-10 scale): 0/10  Any medication changes, allergies to medications, adverse drug reactions, diagnosis change, or new procedure performed?: [x] No    [] Yes (see summary sheet for update)  Subjective functional status/changes:   [x] No changes reported    OBJECTIVE    Modality rationale:    Min Type Additional Details    [] Estim:  []Unatt       []IFC  []Premod                        []Other:  []w/ice   []w/heat  Position:  Location:    [] Estim: []Att    []TENS instruct  []NMES                    []Other:  []w/US   []w/ice   []w/heat  Position:  Location:    []  Traction: [] Cervical       []Lumbar                       [] Prone          []Supine                       []Intermittent   []Continuous Lbs:  [] before manual  [] after manual    []  Ultrasound: []Continuous   [] Pulsed                           []1MHz   []3MHz W/cm2:  Location:    []  Iontophoresis with dexamethasone         Location: [] Take home patch   [] In clinic    []  Ice     []  heat  []  Ice massage  []  Laser   []  Anodyne Position:  Location:    []  Laser with stim  []  Other:  Position:  Location:    []  Vasopneumatic Device Pressure:       [] lo [] med [] hi   Temperature: [] lo [] med [] hi   [] Skin assessment post-treatment:  []intact []redness- no adverse reaction    []redness  adverse reaction:      min []Eval                  []Re-Eval       70 total  23 1:1 min Therapeutic Exercise:  [] See flow sheet :   Rationale: increase strength, improve coordination, improve balance and increase proprioception to improve the patients ability to mobilize independently and safely. min Therapeutic Activity:  []  See flow sheet :         min Neuromuscular Re-education:  []  See flow sheet :        min Manual Therapy:          min Gait Training:  ___ feet with ___ device on level surfaces with ___ level of assist   Rationale: With   [] TE   [] TA   [] neuro   [] other: Patient Education: [x] Review HEP    [] Progressed/Changed HEP based on:   [] positioning   [] body mechanics   [] transfers   [] heat/ice application    [] other:      Other Objective/Functional Measures:    Pt displayed improved movement and transfers today being able to ambulate using RW without suddenly stopping. Pain Level (0-10 scale) post treatment: 0/10    ASSESSMENT/Changes in Function:    Pt displayed improved safety during ambulation today and displayed less confusion during exercises. Patient will continue to benefit from skilled PT services to modify and progress therapeutic interventions, address functional mobility deficits, address ROM deficits, address strength deficits, analyze and address soft tissue restrictions, analyze and cue movement patterns and analyze and modify body mechanics/ergonomics to attain remaining goals. .     []  See Plan of Care  []  See progress note/recertification  []  See Discharge Summary         Progres towards goals / Updated goals:  Long Term Goals (To be achieved in 4 weeks)  1) Increase strength of bilateral LEs by 1/3 grade in order to improve ability to transfer from various surfaces to standing without assistance.   Status at initial evaluation: strength of LEs grossly 4-/5 to 5/5 bilateral LEs  Status at last progress note (5/17/17): Progressing ( Hip flexion: 4-/5 bilaterally, Hip extension: 4/5 bilaterally, Hip adduction: 5/5 bilaterally, Hip abduction: 4+/5 bilaterally, Knee flexion: 4+/5 on right, 4-/5 on left, Knee extension: 4+/5 bilaterally, Ankle DF: 4+/5 bilaterally, Ankle PF: 4/5 on right, 4-/5 on left)  Current status: not reassessed      2)Improved DHI score to <or= 30 points as evidence of improved dizziness with ADL  Status at Eval: [address] [place] [address] 56/100  Status at last progress note (5/17/17): [address] [place] [address] 46 points, progressing  Current status: not reassessed       3) Pt will be able to demonstrate ability to ambulate with least restrictive assistive device x 200 feet with SBA or better in order to mobilize more independently. Status at initial evaluation: pt ambulating with rollator with SBA  Status at last progress note (5/17/17): MET (pt able to ambulate 230 feet using a rollator and SBA)  Current status: not reassessed       4) Pt will increase strength of bilateral LEs to 4+/5 or better in order to normalize function, climb stairs, and allow pt to independently transfer from various surfaces to standing without assistance. Status at initial evaluation: strength of LEs grossly 4-/5 to 5/5 bilateral LEs  Status at last progress note (5/17/17): Progressing ( Hip flexion: 4-/5 bilaterally, Hip extension: 4/5 bilaterally, Hip adduction: 5/5 bilaterally, Hip abduction: 4+/5 bilaterally, Knee flexion: 4+/5 on right, 4-/5 on left, Knee extension: 4+/5 bilaterally, Ankle DF: 4+/5 bilaterally, Ankle PF: 4/5 on right, 4-/5 on left)  Current status: not reassessed      5) FGA score will increase 4 points in order to indicate significant improvement in dynamic balance and decrease risk of falls. Status at initial evaluation: FGA: 10/30  Status at last progress note (5/17/17):  FGA: 11/30 (improved 1 point since [facility])  Current status: not reassessed    PLAN  []  Upgrade activities as tolerated     [x]  Continue plan of care  []  Update interventions per flow sheet       []  Discharge due to:_  []  Other:_      Radha Karimi, PT 5/30/2017  3:08 PM    Future Appointments  Date Time Provider Niraj Light   6/1/2017 2:00 PM Ernesto Willis, [address] [facility]   6/2/2017 2:00 PM Radha Karimi PT [facility]

## 2017-06-01 ENCOUNTER — HOSPITAL ENCOUNTER (OUTPATIENT)
Dept: PHYSICAL THERAPY | Age: 82
Discharge: HOME | End: 2017-06-01
Payer: MEDICARE

## 2017-06-01 PROCEDURE — 97530 THERAPEUTIC ACTIVITIES: CPT

## 2017-06-01 NOTE — PROGRESS NOTES
PT DAILY TREATMENT NOTE - [facility]     Patient Name: Lazarus Abernethy  Date:2017  : 1925  [x]  Patient  Verified  Payor: [place] MEDICARE / Plan: [place] MEDICARE PART A & B / Product Type: Medicare /    In time:205  Out time:255  Total Treatment Time (min): 50  Total Timed Codes (min): 50  1:1 Treatment Time ( [address] only): 30   Visit #: 27 of 30    Treatment Area: Dizziness [R42]  Weakness [R53.1]    SUBJECTIVE  Pain Level (0-10 scale): 0/10  Any medication changes, allergies to medications, adverse drug reactions, diagnosis change, or new procedure performed?: [x] No    [] Yes (see summary sheet for update)  Subjective functional status/changes:   [x] No changes reported    OBJECTIVE    Modality rationale:    Min Type Additional Details    [] Estim:  []Unatt       []IFC  []Premod                        []Other:  []w/ice   []w/heat  Position:  Location:    [] Estim: []Att    []TENS instruct  []NMES                    []Other:  []w/US   []w/ice   []w/heat  Position:  Location:    []  Traction: [] Cervical       []Lumbar                       [] Prone          []Supine                       []Intermittent   []Continuous Lbs:  [] before manual  [] after manual    []  Ultrasound: []Continuous   [] Pulsed                           []1MHz   []3MHz W/cm2:  Location:    []  Iontophoresis with dexamethasone         Location: [] Take home patch   [] In clinic    []  Ice     []  heat  []  Ice massage  []  Laser   []  Anodyne Position:  Location:    []  Laser with stim  []  Other:  Position:  Location:    []  Vasopneumatic Device Pressure:       [] lo [] med [] hi   Temperature: [] lo [] med [] hi   [] Skin assessment post-treatment:  []intact []redness- no adverse reaction    []redness  adverse reaction:      min []Eval                  []Re-Eval       15 min Therapeutic Exercise:  [x] See flow sheet :   Rationale: increase strength, improve coordination, improve balance and increase proprioception to improve the patients ability to mobilize independently and safely. 35 min Therapeutic Activity:  [x]  See flow sheet :gait training, balance activities, postural training         min Neuromuscular Re-education:  []  See flow sheet :        min Manual Therapy:          min Gait Training:  ___ feet with ___ device on level surfaces with ___ level of assist   Rationale: With   [] TE   [] TA   [] neuro   [] other: Patient Education: [x] Review HEP    [] Progressed/Changed HEP based on:   [] positioning   [] body mechanics   [] transfers   [] heat/ice application    [] other:      Other Objective/Functional Measures:   Continues with flexed gait pattern and needs almost constant VC to assume more erect posture   Displayed delayed balance reactions and tendency for backwards LOB    Pain Level (0-10 scale) post treatment: 0/10    ASSESSMENT/Changes in Function:    Pt displayed improved safety during ambulation today and displayed less confusion during exercises. Patient will continue to benefit from skilled PT services to modify and progress therapeutic interventions, address functional mobility deficits, address ROM deficits, address strength deficits, analyze and address soft tissue restrictions, analyze and cue movement patterns and analyze and modify body mechanics/ergonomics to attain remaining goals. .     []  See Plan of Care  []  See progress note/recertification  []  See Discharge Summary         Progres towards goals / Updated goals:  Long Term Goals (To be achieved in 4 weeks)  1) Increase strength of bilateral LEs by 1/3 grade in order to improve ability to transfer from various surfaces to standing without assistance.   Status at initial evaluation: strength of LEs grossly 4-/5 to 5/5 bilateral LEs  Status at last progress note (5/17/17): Progressing ( Hip flexion: 4-/5 bilaterally, Hip extension: 4/5 bilaterally, Hip adduction: 5/5 bilaterally, Hip abduction: 4+/5 bilaterally, Knee flexion: 4+/5 on right, 4-/5 on left, Knee extension: 4+/5 bilaterally, Ankle DF: 4+/5 bilaterally, Ankle PF: 4/5 on right, 4-/5 on left)  Current status: not reassessed      2)Improved DHI score to <or= 30 points as evidence of improved dizziness with ADL  Status at Eval: [address] 56/100  Status at last progress note (5/17/17): [facility] [address] 46 points, progressing  Current status: not reassessed       3) Pt will be able to demonstrate ability to ambulate with least restrictive assistive device x 200 feet with SBA or better in order to mobilize more independently. Status at initial evaluation: pt ambulating with rollator with SBA  Status at last progress note (5/17/17): MET (pt able to ambulate 230 feet using a rollator and SBA)  Current status: not reassessed       4) Pt will increase strength of bilateral LEs to 4+/5 or better in order to normalize function, climb stairs, and allow pt to independently transfer from various surfaces to standing without assistance. Status at initial evaluation: strength of LEs grossly 4-/5 to 5/5 bilateral LEs  Status at last progress note (5/17/17): Progressing ( Hip flexion: 4-/5 bilaterally, Hip extension: 4/5 bilaterally, Hip adduction: 5/5 bilaterally, Hip abduction: 4+/5 bilaterally, Knee flexion: 4+/5 on right, 4-/5 on left, Knee extension: 4+/5 bilaterally, Ankle DF: 4+/5 bilaterally, Ankle PF: 4/5 on right, 4-/5 on left)  Current status: not reassessed      5) FGA score will increase 4 points in order to indicate significant improvement in dynamic balance and decrease risk of falls. Status at initial evaluation: FGA: 10/30  Status at last progress note (5/17/17):  FGA: 11/30 (improved 1 point since [facility])  Current status: not reassessed    PLAN  []  Upgrade activities as tolerated     [x]  Continue plan of care  []  Update interventions per flow sheet       []  Discharge due to:_  []  Other:_      Nubia Stable, PT 6/1/2017  3:08 PM    Future Appointments  Date Time Provider Niraj Light   6/2/2017 2:00 PM Abhilash Bautista, PT [email or web address]

## 2017-06-02 ENCOUNTER — HOSPITAL ENCOUNTER (OUTPATIENT)
Dept: PHYSICAL THERAPY | Age: 82
Discharge: HOME | End: 2017-06-02
Payer: MEDICARE

## 2017-06-02 PROCEDURE — 97530 THERAPEUTIC ACTIVITIES: CPT

## 2017-06-02 PROCEDURE — 97110 THERAPEUTIC EXERCISES: CPT

## 2017-06-02 NOTE — PROGRESS NOTES
PT DAILY TREATMENT NOTE - [facility]     Patient Name: Sadiq Denson  Date:2017  : 1925  [x]  Patient  Verified  Payor: [place] MEDICARE / Plan: [place] MEDICARE PART A & B / Product Type: Medicare /    In time:2:08  Out time:2:50  Total Treatment Time (min): 42  Total Timed Codes (min): 42  1:1 Treatment Time ( [address] only): 42   Visit #: 28 of 30    Treatment Area: Dizziness [R42]  Weakness [R53.1]    SUBJECTIVE  Pain Level (0-10 scale): 0/10  Any medication changes, allergies to medications, adverse drug reactions, diagnosis change, or new procedure performed?: [x] No    [] Yes (see summary sheet for update)  Subjective functional status/changes:   [] No changes reported  \"I am tired today. \"    OBJECTIVE    Modality rationale:    Min Type Additional Details    [] Estim:  []Unatt       []IFC  []Premod                        []Other:  []w/ice   []w/heat  Position:  Location:    [] Estim: []Att    []TENS instruct  []NMES                    []Other:  []w/US   []w/ice   []w/heat  Position:  Location:    []  Traction: [] Cervical       []Lumbar                       [] Prone          []Supine                       []Intermittent   []Continuous Lbs:  [] before manual  [] after manual    []  Ultrasound: []Continuous   [] Pulsed                           []1MHz   []3MHz W/cm2:  Location:    []  Iontophoresis with dexamethasone         Location: [] Take home patch   [] In clinic    []  Ice     []  heat  []  Ice massage  []  Laser   []  Anodyne Position:  Location:    []  Laser with stim  []  Other:  Position:  Location:    []  Vasopneumatic Device Pressure:       [] lo [] med [] hi   Temperature: [] lo [] med [] hi   [] Skin assessment post-treatment:  []intact []redness- no adverse reaction    []redness  adverse reaction:      min []Eval                  []Re-Eval       11 min Therapeutic Exercise:  [x] See flow sheet :   Rationale: increase strength, improve coordination, improve balance and increase proprioception to improve the patients ability to mobilize independently and safely. 31 min Therapeutic Activity:  [x]  See flow sheet :  Performed stair climbing, sit to stands   Rationale: increase strength, improve coordination, improve balance and increase proprioception  to improve the patients ability to normalize transfers and independent       min Neuromuscular Re-education:  []  See flow sheet :        min Manual Therapy:          min Gait Training:  ___ feet with ___ device on level surfaces with ___ level of assist   Rationale: With   [] TE   [] TA   [] neuro   [] other: Patient Education: [x] Review HEP    [] Progressed/Changed HEP based on:   [] positioning   [] body mechanics   [] transfers   [] heat/ice application    [] other:      Other Objective/Functional Measures:   Pt was able to perform sit to stands independently without use of hands 5-10 repetitions. Pain Level (0-10 scale) post treatment: 0/10    ASSESSMENT/Changes in Function:    Pt was able to speed up her ivet of stair climbing by performing recommendations by therapist.  Pt demonstrated ability to transfer from table to standing independently without use of hands consistently. Patient will continue to benefit from skilled PT services to modify and progress therapeutic interventions, address functional mobility deficits, address ROM deficits, address strength deficits, analyze and address soft tissue restrictions, analyze and cue movement patterns and analyze and modify body mechanics/ergonomics to attain remaining goals. .     []  See Plan of Care  []  See progress note/recertification  []  See Discharge Summary         Progress towards goals / Updated goals:  Long Term Goals (To be achieved in 4 weeks)  1) Increase strength of bilateral LEs by 1/3 grade in order to improve ability to transfer from various surfaces to standing without assistance.   Status at initial evaluation: strength of LEs grossly 4-/5 to 5/5 bilateral LEs  Status at last progress note (5/17/17): Progressing ( Hip flexion: 4-/5 bilaterally, Hip extension: 4/5 bilaterally, Hip adduction: 5/5 bilaterally, Hip abduction: 4+/5 bilaterally, Knee flexion: 4+/5 on right, 4-/5 on left, Knee extension: 4+/5 bilaterally, Ankle DF: 4+/5 bilaterally, Ankle PF: 4/5 on right, 4-/5 on left)  Current status: not reassessed      2)Improved DHI score to <or= 30 points as evidence of improved dizziness with ADL  Status at Eval: [address] [place] [address] 56/100  Status at last progress note (5/17/17): [address] [place] [address] 46 points, progressing  Current status: not reassessed       3) Pt will be able to demonstrate ability to ambulate with least restrictive assistive device x 200 feet with SBA or better in order to mobilize more independently. Status at initial evaluation: pt ambulating with rollator with SBA  Status at last progress note (5/17/17): MET (pt able to ambulate 230 feet using a rollator and SBA)  Current status: not reassessed       4) Pt will increase strength of bilateral LEs to 4+/5 or better in order to normalize function, climb stairs, and allow pt to independently transfer from various surfaces to standing without assistance. Status at initial evaluation: strength of LEs grossly 4-/5 to 5/5 bilateral LEs  Status at last progress note (5/17/17): Progressing ( Hip flexion: 4-/5 bilaterally, Hip extension: 4/5 bilaterally, Hip adduction: 5/5 bilaterally, Hip abduction: 4+/5 bilaterally, Knee flexion: 4+/5 on right, 4-/5 on left, Knee extension: 4+/5 bilaterally, Ankle DF: 4+/5 bilaterally, Ankle PF: 4/5 on right, 4-/5 on left)  Current status: not reassessed      5) FGA score will increase 4 points in order to indicate significant improvement in dynamic balance and decrease risk of falls. Status at initial evaluation: FGA: 10/30  Status at last progress note (5/17/17):  FGA: 11/30 (improved 1 point since [facility])  Current status: not reassessed    PLAN  []  Upgrade activities as tolerated     [x]  Continue plan of care  []  Update interventions per flow sheet       []  Discharge due to:_  []  Other:_      Ammy Rush, PT 6/2/2017  2:55 PM    Future Appointments  Date Time Provider Niraj Light   6/15/2017 3:30 PM Amanda COOPER [facility]   6/20/2017 2:00 PM Ammy Rush, PT [facility]   6/22/2017 2:30 PM Trenton Dhillon, PT [facility]

## 2017-06-15 ENCOUNTER — HOSPITAL ENCOUNTER (OUTPATIENT)
Dept: PHYSICAL THERAPY | Age: 82
Discharge: HOME | End: 2017-06-15
Payer: MEDICARE

## 2017-06-15 PROCEDURE — G8978 MOBILITY CURRENT STATUS: HCPCS

## 2017-06-15 PROCEDURE — G8979 MOBILITY GOAL STATUS: HCPCS

## 2017-06-15 PROCEDURE — 97110 THERAPEUTIC EXERCISES: CPT

## 2017-06-15 NOTE — PROGRESS NOTES
PT DAILY TREATMENT NOTE - [facility]     Patient Name: Nat Daily  Date:6/15/2017  : 1925  [x]  Patient  Verified  Payor: [place] MEDICARE / Plan: [place] MEDICARE PART A & B / Product Type: Medicare /    In time:330  Out time:410  Total Treatment Time (min): 40  Total Timed Codes (min): 40  1:1 Treatment Time ( [address] only): na   Visit #: 29 of 32    Treatment Area: Dizziness [R42]  Weakness [R53.1]    SUBJECTIVE  Pain Level (0-10 scale): 0  Any medication changes, allergies to medications, adverse drug reactions, diagnosis change, or new procedure performed?: [x] No    [] Yes (see summary sheet for update)  Subjective functional status/changes:   [] No changes reported  Reports no pain, general fatigue from recent travels. OBJECTIVE    40 min Therapeutic Exercise:  [] See flow sheet :   Rationale: increase ROM, increase strength and improve coordination     With   [] TE   [] TA   [] neuro   [] other: Patient Education: [x] Review HEP    [] Progressed/Changed HEP based on:   [] positioning   [] body mechanics   [] transfers   [] heat/ice application    [] other:      Other Objective/Functional Measures:   none     Pain Level (0-10 scale) post treatment: 0    ASSESSMENT/Changes in Function:   Good tolerance to TE with no c/o fatigue today. Tends to drag left foot while ambulating, requires moderate cuing    Patient will continue to benefit from skilled PT services to modify and progress therapeutic interventions, address functional mobility deficits, address ROM deficits and address strength deficits to attain remaining goals. [x]  See Plan of Care  []  See progress note/recertification  []  See Discharge Summary         Progress towards goals / Updated goals:  Long Term Goals (To be achieved in 4 weeks)  1) Increase strength of bilateral LEs by 1/3 grade in order to improve ability to transfer from various surfaces to standing without assistance.   Status at initial evaluation: strength of LEs grossly 4-/5 to 5/5 bilateral LEs  Status at last progress note (5/17/17): Progressing ( Hip flexion: 4-/5 bilaterally, Hip extension: 4/5 bilaterally, Hip adduction: 5/5 bilaterally, Hip abduction: 4+/5 bilaterally, Knee flexion: 4+/5 on right, 4-/5 on left, Knee extension: 4+/5 bilaterally, Ankle DF: 4+/5 bilaterally, Ankle PF: 4/5 on right, 4-/5 on left)  Current status: not reassessed      2)Improved DHI score to <or= 30 points as evidence of improved dizziness with ADL  Status at Eval: [address] [place] [address] 56/100  Status at last progress note (5/17/17): [address] [place] [address] 46 points, progressing  Current status: not reassessed       3) Pt will be able to demonstrate ability to ambulate with least restrictive assistive device x 200 feet with SBA or better in order to mobilize more independently. Status at initial evaluation: pt ambulating with rollator with SBA  Status at last progress note (5/17/17): MET (pt able to ambulate 230 feet using a rollator and SBA)  Current status: not reassessed       4) Pt will increase strength of bilateral LEs to 4+/5 or better in order to normalize function, climb stairs, and allow pt to independently transfer from various surfaces to standing without assistance. Status at initial evaluation: strength of LEs grossly 4-/5 to 5/5 bilateral LEs  Status at last progress note (5/17/17): Progressing ( Hip flexion: 4-/5 bilaterally, Hip extension: 4/5 bilaterally, Hip adduction: 5/5 bilaterally, Hip abduction: 4+/5 bilaterally, Knee flexion: 4+/5 on right, 4-/5 on left, Knee extension: 4+/5 bilaterally, Ankle DF: 4+/5 bilaterally, Ankle PF: 4/5 on right, 4-/5 on left)  Current status: not reassessed      5) FGA score will increase 4 points in order to indicate significant improvement in dynamic balance and decrease risk of falls. Status at initial evaluation: FGA: 10/30  Status at last progress note (5/17/17):  FGA: 11/30 (improved 1 point since [facility])  Current status: not reassessed    PLAN  [x]  Upgrade activities as tolerated     [x]  Continue plan of care  []  Update interventions per flow sheet       []  Discharge due to:_  []  Other:_      Zaki Garcia PTA 6/15/2017  3:54 PM    Future Appointments  Date Time Provider Niraj Light   6/20/2017 2:00 PM Andres Burkitt, PT [facility]   6/22/2017 2:30 PM DARYL Corbett [facility]

## 2017-06-20 ENCOUNTER — HOSPITAL ENCOUNTER (OUTPATIENT)
Dept: PHYSICAL THERAPY | Age: 82
Discharge: HOME | End: 2017-06-20
Payer: MEDICARE

## 2017-06-20 PROCEDURE — 97530 THERAPEUTIC ACTIVITIES: CPT

## 2017-06-20 PROCEDURE — 97164 PT RE-EVAL EST PLAN CARE: CPT

## 2017-06-20 NOTE — PROGRESS NOTES
PT DAILY TREATMENT NOTE - [facility]     Patient Name: Valentín Jaquez  Date:2017  : 1925  [x]  Patient  Verified  Payor: [place] MEDICARE / Plan: [place] MEDICARE PART A & B / Product Type: Medicare /    In time:2:07  Out time:2:50  Total Treatment Time (min): 43  Total Timed Codes (min): 28  1:1 Treatment Time ( [address] only): 25   Visit #: 30 of 37    Treatment Area: Dizziness [R42]  Weakness [R53.1]    SUBJECTIVE  Pain Level (0-10 scale): 0/10  Any medication changes, allergies to medications, adverse drug reactions, diagnosis change, or new procedure performed?: [x] No    [] Yes (see summary sheet for update)  Subjective functional status/changes:   [] No changes reported  \"I often guide myself on furniture when I walk at home. \"    OBJECTIVE    Modality rationale:    Min Type Additional Details    [] Estim:  []Unatt       []IFC  []Premod                        []Other:  []w/ice   []w/heat  Position:  Location:    [] Estim: []Att    []TENS instruct  []NMES                    []Other:  []w/US   []w/ice   []w/heat  Position:  Location:    []  Traction: [] Cervical       []Lumbar                       [] Prone          []Supine                       []Intermittent   []Continuous Lbs:  [] before manual  [] after manual    []  Ultrasound: []Continuous   [] Pulsed                           []1MHz   []3MHz W/cm2:  Location:    []  Iontophoresis with dexamethasone         Location: [] Take home patch   [] In clinic    []  Ice     []  heat  []  Ice massage  []  Laser   []  Anodyne Position:  Location:    []  Laser with stim  []  Other:  Position:  Location:    []  Vasopneumatic Device Pressure:       [] lo [] med [] hi   Temperature: [] lo [] med [] hi   [] Skin assessment post-treatment:  []intact []redness- no adverse reaction    []redness  adverse reaction:     15 1:1 min []Eval                  [x]Re-Eval        min Therapeutic Exercise:  [] See flow sheet :       18 total  10 1:1 min Therapeutic Activity:  [] See flow sheet :   Rationale: increase strength, improve coordination, improve balance and increase proprioception to improve the patients ability to normalize transfers and independent      10 total  0 1:1 min Neuromuscular Re-education:  []  See flow sheet :   Rationale: increase strength, improve coordination, improve balance and increase proprioception  to improve the patients ability to mobilize and transfer. min Manual Therapy:          min Gait Training:  ___ feet with ___ device on level surfaces with ___ level of assist   Rationale: With   [] TE   [] TA   [] neuro   [] other: Patient Education: [x] Review HEP    [] Progressed/Changed HEP based on:   [] positioning   [] body mechanics   [] transfers   [] heat/ice application    [] other:      Other Objective/Functional Measures:   Strength hip flexors: 4/5 right, 4-/5 left  Hip extensors: 5/5 right, 4+/5 left   Hip adductors: 5/5 B  Hip abductors: 4+/5 B  Knee extensors: 4/5 B  Knee flexors: 4/5 right, 4-/5 left  Ankle DF: 4+/5 right, 5/5 left  Ambulated 140 feet with SPC with CGA. Pain Level (0-10 scale) post treatment: 0/10    ASSESSMENT/Changes in Function:    Pt has shown little improvement in balance requiring verbal cues to nt reach for furniture and objects for support. Pt's LEs' strength has shown improvement and is grossly 4/5 to 5/5 all muscle groups except left hip flexors (4-/5) and left knee flexors (4-/5). Pt displays ability to ambulate using SPC, but is unsteady and it is recommended that pt continue to use RW for primary mobility device. Pt attended only 75% of her scheduled appointments due to travelling. It is recommended that pt participate in 4 more weeks of PT to improve strength and safe ambulation. If pt does not show additional progress this period pt will be discharged to continue HEP independently to improve strength on her own.      Patient will continue to benefit from skilled PT services to modify and progress therapeutic interventions, address functional mobility deficits, address ROM deficits, address strength deficits, analyze and address soft tissue restrictions, analyze and cue movement patterns and analyze and modify body mechanics/ergonomics to attain remaining goals. []  See Plan of Care  []  See progress note/recertification  []  See Discharge Summary         Progress towards goals / Updated goals:  Long Term Goals (To be achieved in 4 weeks)  1) Increase strength of bilateral LEs by 1/3 grade in order to improve ability to transfer from various surfaces to standing without assistance. Status at initial evaluation: strength of LEs grossly 4-/5 to 5/5 bilateral LEs  Status at last progress note (5/17/17): Progressing (Hip flexion: 4-/5 bilaterally, Hip extension: 4/5 bilaterally, Hip adduction: 5/5 bilaterally, Hip abduction: 4+/5 bilaterally, Knee flexion: 4+/5 on right, 4-/5 on left, Knee extension: 4+/5 bilaterally, Ankle DF: 4+/5 bilaterally, Ankle PF: 4/5 on right, 4-/5 on left)  Current status: mixed progress (strength hip flexors: 4/5 right (improved 1/3 grade), 4-/5 left (no change), Hip extensors: 5/5 right (improved 1 grade), 4+/5 left (improved 1/3 grade), Hip adductors: 5/5 bilaterally (no change), Hip abductors: 4+/5 bilaterally (no change), Knee extensors: 4/5 bilaterally (decreased 1/3 grade bilaterally), Knee flexors: 4/5 right (decreased 1/3 grade), 4-/5 left (no change), Ankle DF: 4+/5 right (no change), 5/5 left (improved 1/3 grade)) 6/20/17      2 Improved DHI score to <or= 30 points as evidence of improved dizziness with ADL  Status at Eval: [address] [place] [address] 56/100  Status at last progress note (5/17/17): [address] [place] [address] 46 points, progressing  Current status: not reassessed       3) Pt will be able to demonstrate ability to ambulate with least restrictive assistive device x 200 feet with SBA or better in order to mobilize more independently.   Status at initial evaluation: pt ambulating with rollator with SBA  Status at last progress note (5/17/17): MET (pt able to ambulate 230 feet using a rollator and SBA)  Current status: progressing with SPC, met with rollator (pt ambulated 140 feet with SPC with SBA) 6/20/17      4) Pt will increase strength of bilateral LEs to 4+/5 or better in order to normalize function, climb stairs, and allow pt to independently transfer from various surfaces to standing without assistance. Status at initial evaluation: strength of LEs grossly 4-/5 to 5/5 bilateral LEs  Status at last progress note (5/17/17): Progressing ( Hip flexion: 4-/5 bilaterally, Hip extension: 4/5 bilaterally, Hip adduction: 5/5 bilaterally, Hip abduction: 4+/5 bilaterally, Knee flexion: 4+/5 on right, 4-/5 on left, Knee extension: 4+/5 bilaterally, Ankle DF: 4+/5 bilaterally, Ankle PF: 4/5 on right, 4-/5 on left)  Current status: mixed progress (strength hip flexors: 4/5 right (improved 1/3 grade), 4-/5 left (no change), Hip extensors: 5/5 right (improved 1 grade), 4+/5 left (improved 1/3 grade), Hip adductors: 5/5 bilaterally (no change), Hip abductors: 4+/5 bilaterally (no change), Knee extensors: 4/5 bilaterally (decreased 1/3 grade bilaterally), Knee flexors: 4/5 right (decreased 1/3 grade), 4-/5 left (no change), Ankle DF: 4+/5 right (no change), 5/5 left (improved 1/3 grade)) 6/20/17      5) FGA score will increase 4 points in order to indicate significant improvement in dynamic balance and decrease risk of falls. Status at initial evaluation: FGA: 10/30  Status at last progress note (5/17/17):  FGA: 11/30 (improved 1 point since [facility])  Current status: not reassessed    PLAN  []  Upgrade activities as tolerated     [x]  Continue plan of care  []  Update interventions per flow sheet       []  Discharge due to:_  []  Other:_      Ruth Kamara PT 6/20/2017  2:33 PM    Future Appointments  Date Time Provider [place]   6/22/2017 2:30 PM DARYL Morrell THE [facility]

## 2017-06-20 NOTE — PROGRESS NOTES
[facility] at [facility]. Antoni Olivas[address]  Phone: [phone] Fax: [phone]    Continued Plan of Care/ Re-certification for Physical Therapy Services    Patient name: Valentín Jaquez Start of Care: 17   Referral source: Isra Darby MD : 1925   Medical/Treatment Diagnosis: Dizziness [R42]  Weakness [R53.1] Onset Date: 2016     Prior Hospitalization: see medical history Provider#: [id number]   Medications: Verified on Patient Summary List    Comorbidities: UTI, HTN, visual impairment, hearing impairment, fall risk  Prior Level of Function:ambulation with rollator, requires assistance with ADLs except bathing and dressing  Visits from Start of Care: 30    Missed Visits: 3    The Plan of Care and following information is based on the patient's current status:   1) Goal: Increase strength of bilateral LEs by 1/3 grade in order to improve ability to transfer from various surfaces to standing without assistance.   Status at initial evaluation: strength of LEs grossly 4-/5 to 5/5 bilateral LEs  Status at last progress note (17): Progressing (Hip flexion: 4-/5 bilaterally, Hip extension: 4/5 bilaterally, Hip adduction: 5/5 bilaterally, Hip abduction: 4+/5 bilaterally, Knee flexion: 4+/5 on right, 4-/5 on left, Knee extension: 4+/5 bilaterally, Ankle DF: 4+/5 bilaterally, Ankle PF: 4/5 on right, 4-/5 on left)  Current status: mixed progress (strength hip flexors: 4/5 right (improved 1/3 grade), 4-/5 left (no change), Hip extensors: 5/5 right (improved 1 grade), 4+/5 left (improved 1/3 grade), Hip adductors: 5/5 bilaterally (no change), Hip abductors: 4+/5 bilaterally (no change), Knee extensors: 4/5 bilaterally (decreased 1/3 grade bilaterally), Knee flexors: 4/5 right (decreased 1/3 grade), 4-/5 left (no change), Ankle DF: 4+/5 right (no change), 5/5 left (improved 1/3 grade)) 17       2) Goal: Improved DHI score to <or= 30 points as evidence of improved dizziness with ADL  Status at Eval: [address] 56/100  Status at last progress note (5/17/17): [address] [place] [address] 46 points, progressing  Current status: not reassessed        3) Goal: Pt will be able to demonstrate ability to ambulate with least restrictive assistive device x 200 feet with SBA or better in order to mobilize more independently. Status at initial evaluation: pt ambulating with rollator with SBA  Status at last progress note (5/17/17): MET (pt able to ambulate 230 feet using a rollator and SBA)  Current status: progressing with SPC, met with rollator (pt ambulated 140 feet with SPC with SBA) 6/20/17       4) Goal: Pt will increase strength of bilateral LEs to 4+/5 or better in order to normalize function, climb stairs, and allow pt to independently transfer from various surfaces to standing without assistance. Status at initial evaluation: strength of LEs grossly 4-/5 to 5/5 bilateral LEs  Status at last progress note (5/17/17): Progressing ( Hip flexion: 4-/5 bilaterally, Hip extension: 4/5 bilaterally, Hip adduction: 5/5 bilaterally, Hip abduction: 4+/5 bilaterally, Knee flexion: 4+/5 on right, 4-/5 on left, Knee extension: 4+/5 bilaterally, Ankle DF: 4+/5 bilaterally, Ankle PF: 4/5 on right, 4-/5 on left)  Current status: mixed progress (strength hip flexors: 4/5 right (improved 1/3 grade), 4-/5 left (no change), Hip extensors: 5/5 right (improved 1 grade), 4+/5 left (improved 1/3 grade), Hip adductors: 5/5 bilaterally (no change), Hip abductors: 4+/5 bilaterally (no change), Knee extensors: 4/5 bilaterally (decreased 1/3 grade bilaterally), Knee flexors: 4/5 right (decreased 1/3 grade), 4-/5 left (no change), Ankle DF: 4+/5 right (no change), 5/5 left (improved 1/3 grade)) 6/20/17       5) Goal: FGA score will increase 4 points in order to indicate significant improvement in dynamic balance and decrease risk of falls. Status at initial evaluation: FGA: 10/30  Status at last progress note (5/17/17):  FGA: 11/30 (improved 1 point since [facility])  Current status: not reassessed    Key functional changes:    Pt has shown little improvement in balance requiring verbal cues to nt reach for furniture and objects for support. Pt's LEs' strength has shown improvement and is grossly 4/5 to 5/5 all muscle groups except left hip flexors (4-/5) and left knee flexors (4-/5). Pt displays ability to ambulate using SPC, but is unsteady and it is recommended that pt continue to use RW for primary mobility device. Pt attended only 75% of her scheduled appointments due to travelling. Problems/ barriers to goal attainment: travelling     Problem List: impaired gait/ balance, decrease ADL/ functional abilitiies, decrease activity tolerance, decrease flexibility/ joint mobility and decrease transfer abilities      Treatment Plan: Therapeutic exercise, Therapeutic activities, Neuromuscular re-education, Physical agent/modality, Gait/balance training, Manual therapy and Patient education      Patient Goal (s) has been updated and includes: \"I want to be able to walk and function without fear of falling. \"      Goals for this certification period to be accomplished in 4 weeks:   1) Continue with unmet goals above.     Frequency / Duration: Patient to be seen 2 times per week for 4 weeks:    Assessment / Recommendations: It is recommended that pt participate in 4 more weeks of PT to improve strength and safe ambulation. If pt does not show additional progress this period pt will be discharged to continue HEP independently to improve strength on her own. G-Codes (GP)  Mobility  [id number] Current  CL= 60-79%  [id number] Goal  CJ= 20-39%  The severity rating is based on clinical judgment and the FOTO score.     Certification Period: 6/16/17 to 7/15/17    Dario Simpson, PT 6/20/2017 1:00 PM    ________________________________________________________________________  I certify that the above Therapy Services are being furnished while the patient is under my care. I agree with the treatment plan and certify that this therapy is necessary. [] I have read the above and request that my patient continue as recommended. [] I have read the above report and request that my patient continue therapy with the following changes/special instructions: _______________________________________  [] I have read the above report and request that my patient be discharged from therapy    Physician's Signature:________________________________Date:___________Time:__________    Please sign and return to [facility] at [facility].  Stephon Wright [address]  Phone: [phone] Fax: [phone]

## 2017-06-22 ENCOUNTER — APPOINTMENT (OUTPATIENT)
Dept: PHYSICAL THERAPY | Age: 82
End: 2017-06-22
Payer: MEDICARE

## 2017-06-28 ENCOUNTER — HOSPITAL ENCOUNTER (OUTPATIENT)
Dept: PHYSICAL THERAPY | Age: 82
Discharge: HOME | End: 2017-06-28
Payer: MEDICARE

## 2017-06-28 PROCEDURE — G8978 MOBILITY CURRENT STATUS: HCPCS

## 2017-06-28 PROCEDURE — G8979 MOBILITY GOAL STATUS: HCPCS

## 2017-06-28 PROCEDURE — 97112 NEUROMUSCULAR REEDUCATION: CPT

## 2017-07-03 NOTE — PROGRESS NOTES
PT DAILY TREATMENT NOTE - [facility]     Patient Name: Anabel Phoenix  Date:7/3/2017  : 1925  [x]  Patient  Verified  Payor: [place] MEDICARE / Plan: VA MEDICARE PART A & B / Product Type: Medicare /    In time:5  Out time:555  Total Treatment Time (min): 55  Total Timed Codes (min): 55  1:1 Treatment Time ( [address] only): 35   Visit #: 32 of 40    Treatment Area: Dizziness [R42]  Weakness [R53.1]    SUBJECTIVE  Pain Level (0-10 scale): 0/10  Any medication changes, allergies to medications, adverse drug reactions, diagnosis change, or new procedure performed?: [x] No    [] Yes (see summary sheet for update)  Subjective functional status/changes:   [] No changes reported  \"I have not been doing those vestibular ex and I am still dizzy\"    OBJECTIVE    Modality rationale:    Min Type Additional Details    [] Estim:  []Unatt       []IFC  []Premod                        []Other:  []w/ice   []w/heat  Position:  Location:    [] Estim: []Att    []TENS instruct  []NMES                    []Other:  []w/US   []w/ice   []w/heat  Position:  Location:    []  Traction: [] Cervical       []Lumbar                       [] Prone          []Supine                       []Intermittent   []Continuous Lbs:  [] before manual  [] after manual    []  Ultrasound: []Continuous   [] Pulsed                           []1MHz   []3MHz W/cm2:  Location:    []  Iontophoresis with dexamethasone         Location: [] Take home patch   [] In clinic    []  Ice     []  heat  []  Ice massage  []  Laser   []  Anodyne Position:  Location:    []  Laser with stim  []  Other:  Position:  Location:    []  Vasopneumatic Device Pressure:       [] lo [] med [] hi   Temperature: [] lo [] med [] hi   [] Skin assessment post-treatment:  []intact []redness- no adverse reaction    []redness  adverse reaction:      min []Eval                  []Re-Eval       20 min Therapeutic Exercise:  [x] See flow sheet :        min Therapeutic Activity:  []  See flow sheet : Rationale: increase strength, improve coordination, improve balance and increase proprioception to improve the patients ability to normalize transfers and independent      35 min Neuromuscular Re-education:  [x]  See flow sheet :vestibular ex with focus on gaze stability, static/dynamic balance, facilitation of balance reactions and ankle strategies, gait activities without RW,patient and  were given new copies of vestibular ex to assure improved compliance   Rationale: increase strength, improve coordination, improve balance and increase proprioception  to improve the patients ability to mobilize and transfer. min Manual Therapy:          min Gait Training:  ___ feet with ___ device on level surfaces with ___ level of assist   Rationale: With   [] TE   [] TA   [] neuro   [] other: Patient Education: [x] Review HEP    [] Progressed/Changed HEP based on:   [] positioning   [] body mechanics   [] transfers   [] heat/ice application    [] other:      Other Objective/Functional Measures:   Deficit in gaze stability  Fall risk, continues with unsteady flexed gait pattern, mostly using rollator  Absent trunk rot/arm swing      Pain Level (0-10 scale) post treatment: 0/10    ASSESSMENT/Changes in Function:    Pt has shown little improvement in balance requiring verbal cues to nt reach for furniture and objects for support. Pt's LEs' strength has shown improvement and is grossly 4/5 to 5/5 all muscle groups except left hip flexors (4-/5) and left knee flexors (4-/5). Pt displays ability to ambulate using SPC, but is unsteady and it is recommended that pt continue to use RW for primary mobility device. Pt attended only 75% of her scheduled appointments due to travelling. It is recommended that pt participate in 4 more weeks of PT to improve strength and safe ambulation.   If pt does not show additional progress this period pt will be discharged to continue HEP independently to improve strength on her own. Patient will continue to benefit from skilled PT services to modify and progress therapeutic interventions, address functional mobility deficits, address ROM deficits, address strength deficits, analyze and address soft tissue restrictions, analyze and cue movement patterns and analyze and modify body mechanics/ergonomics to attain remaining goals. []  See Plan of Care  []  See progress note/recertification  []  See Discharge Summary         Progress towards goals / Updated goals:  Long Term Goals (To be achieved in 4 weeks)  1) Increase strength of bilateral LEs by 1/3 grade in order to improve ability to transfer from various surfaces to standing without assistance.   Status at initial evaluation: strength of LEs grossly 4-/5 to 5/5 bilateral LEs  Status at last progress note (5/17/17): Progressing (Hip flexion: 4-/5 bilaterally, Hip extension: 4/5 bilaterally, Hip adduction: 5/5 bilaterally, Hip abduction: 4+/5 bilaterally, Knee flexion: 4+/5 on right, 4-/5 on left, Knee extension: 4+/5 bilaterally, Ankle DF: 4+/5 bilaterally, Ankle PF: 4/5 on right, 4-/5 on left)  Current status: mixed progress (strength hip flexors: 4/5 right (improved 1/3 grade), 4-/5 left (no change), Hip extensors: 5/5 right (improved 1 grade), 4+/5 left (improved 1/3 grade), Hip adductors: 5/5 bilaterally (no change), Hip abductors: 4+/5 bilaterally (no change), Knee extensors: 4/5 bilaterally (decreased 1/3 grade bilaterally), Knee flexors: 4/5 right (decreased 1/3 grade), 4-/5 left (no change), Ankle DF: 4+/5 right (no change), 5/5 left (improved 1/3 grade)) 6/20/17      2 Improved DHI score to <or= 30 points as evidence of improved dizziness with ADL  Status at Eval: [address] [place] [address] 56/100  Status at last progress note (5/17/17): [address] [place] [address] 46 points, progressing  Current status: not reassessed       3) Pt will be able to demonstrate ability to ambulate with least restrictive assistive device x 200 feet with SBA or better in order to mobilize more independently. Status at initial evaluation: pt ambulating with rollator with SBA  Status at last progress note (5/17/17): MET (pt able to ambulate 230 feet using a rollator and SBA)  Current status: progressing with SPC, met with rollator (pt ambulated 140 feet with SPC with SBA) 6/20/17      4) Pt will increase strength of bilateral LEs to 4+/5 or better in order to normalize function, climb stairs, and allow pt to independently transfer from various surfaces to standing without assistance. Status at initial evaluation: strength of LEs grossly 4-/5 to 5/5 bilateral LEs  Status at last progress note (5/17/17): Progressing ( Hip flexion: 4-/5 bilaterally, Hip extension: 4/5 bilaterally, Hip adduction: 5/5 bilaterally, Hip abduction: 4+/5 bilaterally, Knee flexion: 4+/5 on right, 4-/5 on left, Knee extension: 4+/5 bilaterally, Ankle DF: 4+/5 bilaterally, Ankle PF: 4/5 on right, 4-/5 on left)  Current status: mixed progress (strength hip flexors: 4/5 right (improved 1/3 grade), 4-/5 left (no change), Hip extensors: 5/5 right (improved 1 grade), 4+/5 left (improved 1/3 grade), Hip adductors: 5/5 bilaterally (no change), Hip abductors: 4+/5 bilaterally (no change), Knee extensors: 4/5 bilaterally (decreased 1/3 grade bilaterally), Knee flexors: 4/5 right (decreased 1/3 grade), 4-/5 left (no change), Ankle DF: 4+/5 right (no change), 5/5 left (improved 1/3 grade)) 6/20/17      5) FGA score will increase 4 points in order to indicate significant improvement in dynamic balance and decrease risk of falls. Status at initial evaluation: FGA: 10/30  Status at last progress note (5/17/17):  FGA: 11/30 (improved 1 point since [facility])  Current status: not reassessed    PLAN  []  Upgrade activities as tolerated     [x]  Continue plan of care for 1 visit at this time, formal reevaluation, determine further POC  []  Update interventions per flow sheet       []  Discharge due to:_  []  Other:_      Aracely Hollins, PT 7/3/2017  2:33 PM    No future appointments.

## 2017-07-12 ENCOUNTER — APPOINTMENT (OUTPATIENT)
Dept: PHYSICAL THERAPY | Age: 82
End: 2017-07-12
Payer: MEDICARE

## 2017-07-13 ENCOUNTER — APPOINTMENT (OUTPATIENT)
Dept: PHYSICAL THERAPY | Age: 82
End: 2017-07-13
Payer: MEDICARE

## 2017-07-25 ENCOUNTER — HOSPITAL ENCOUNTER (OUTPATIENT)
Dept: PHYSICAL THERAPY | Age: 82
Discharge: HOME | End: 2017-07-25
Payer: MEDICARE

## 2017-07-25 PROCEDURE — 97110 THERAPEUTIC EXERCISES: CPT

## 2017-07-25 PROCEDURE — 97530 THERAPEUTIC ACTIVITIES: CPT

## 2017-07-25 PROCEDURE — G8980 MOBILITY D/C STATUS: HCPCS

## 2017-07-25 PROCEDURE — G8979 MOBILITY GOAL STATUS: HCPCS

## 2017-07-25 NOTE — PROGRESS NOTES
PT DAILY TREATMENT NOTE - [facility]     Patient Name: Wheeler Brace  Date:2017  : 1925  [x]  Patient  Verified  Payor: [place] MEDICARE / Plan: [place] MEDICARE PART A & B / Product Type: Medicare /    In time:137  Out time:245  Total Treatment Time (min): 68  Total Timed Codes (min):45  1:1 Treatment Time ( [address] only): 39   Visit #: 28 of 37    Treatment Area: Dizziness [R42]  Weakness [R53.1]    SUBJECTIVE  Pain Level (0-10 scale): 0/10  Any medication changes, allergies to medications, adverse drug reactions, diagnosis change, or new procedure performed?: [x] No    [] Yes (see summary sheet for update)  Subjective functional status/changes:   [] No changes reported  \"I have been doing some of my HEP. I do feel better but it varies. Going to [place] soon. \"    OBJECTIVE    Modality rationale:    Min Type Additional Details    [] Estim:  []Unatt       []IFC  []Premod                        []Other:  []w/ice   []w/heat  Position:  Location:    [] Estim: []Att    []TENS instruct  []NMES                    []Other:  []w/US   []w/ice   []w/heat  Position:  Location:    []  Traction: [] Cervical       []Lumbar                       [] Prone          []Supine                       []Intermittent   []Continuous Lbs:  [] before manual  [] after manual    []  Ultrasound: []Continuous   [] Pulsed                           []1MHz   []3MHz W/cm2:  Location:    []  Iontophoresis with dexamethasone         Location: [] Take home patch   [] In clinic    []  Ice     []  heat  []  Ice massage  []  Laser   []  Anodyne Position:  Location:    []  Laser with stim  []  Other:  Position:  Location:    []  Vasopneumatic Device Pressure:       [] lo [] med [] hi   Temperature: [] lo [] med [] hi   [] Skin assessment post-treatment:  []intact []redness- no adverse reaction    []redness  adverse reaction:      min []Eval                  []Re-Eval       15 min Therapeutic Exercise:  [x] See flow sheet :       30 min Therapeutic Activity:  [x]  See flow sheet :reevaluation of status, gait/balance and transfer training, instruction in advanced HEP   Rationale: increase strength, improve coordination, improve balance and increase proprioception to improve the patients ability to normalize transfers and independent                With   [] TE   [] TA   [] neuro   [] other: Patient Education: [x] Review HEP    [] Progressed/Changed HEP based on:   [] positioning   [] body mechanics   [] transfers   [] heat/ice application    [] other:      Other Objective/Functional Measures:   Instructed patient and  in HEP  FGA not changed since last tested (11/30) but overall improved from 5/30  Fall risk, continues with unsteady flexed gait pattern, mostly using rollator  Absent trunk rot/arm swing  Patient needed assist with FOTO questionnaire      Pain Level (0-10 scale) post treatment: 0/10    y         Progress towards goals / Updated goals:Mrs. Escobar Nickerson has been showing progress in ambulatory balance, general strength and mobility. She has met 2 of 5 goals and has reached a plateau in regards to unmet goals. She is independent with an advanced HEP to address remaining deficits. I recommend discharge to [facility] at this time. Long Term Goals (To be achieved in 4 weeks)  1) Increase strength of bilateral LEs by 1/3 grade in order to improve ability to transfer from various surfaces to standing without assistance.   Status at initial evaluation: strength of LEs grossly 4-/5 to 5/5 bilateral LEs  Status at last progress note (5/17/17): Progressing (Hip flexion: 4-/5 bilaterally, Hip extension: 4/5 bilaterally, Hip adduction: 5/5 bilaterally, Hip abduction: 4+/5 bilaterally, Knee flexion: 4+/5 on right, 4-/5 on left, Knee extension: 4+/5 bilaterally, Ankle DF: 4+/5 bilaterally, Ankle PF: 4/5 on right, 4-/5 on left)  Current status: mixed progress (strength hip flexors: 4/5 right (improved 1/3 grade), 4-/5 left (no change), Hip extensors: 5/5 right (improved 1 grade), 4+/5 left (improved 1/3 grade), Hip adductors: 5/5 bilaterally (no change), Hip abductors: 4+/5 bilaterally (no change), Knee extensors: 4/5 bilaterally (decreased 1/3 grade bilaterally), Knee flexors: 4/5 right (decreased 1/3 grade), 4-/5 left (no change), Ankle DF: 4+/5 right (no change), 5/5 left (improved 1/3 grade)) 6/20/17  Status at D/C: hip Flex righ 5-, left 4/5, knee Ext both 5/5, knee Flex right 5-/5, left 4+/5, ADD 5-/5, ABD 4/4, overall LE strength ranging from 4-5-/5 MMT, goal met      2 Improved DHI score to <or= 30 points as evidence of improved dizziness with ADL  Status at Eval: [address] [place] [address] 56/100  Status at last progress note (5/17/17): [address] [place] [address] 46 points, progressing  Current status:44/100, progressing      3) Pt will be able to demonstrate ability to ambulate with least restrictive assistive device x 200 feet with SBA or better in order to mobilize more independently. Status at initial evaluation: pt ambulating with rollator with SBA  Status at last progress note (5/17/17): MET (pt able to ambulate 230 feet using a rollator and SBA)  Current status: progressing with SPC, met with rollator (pt ambulated 140 feet with SPC with SBA) 6/20/17  Status at D/C: ability to ambulate with SC and CGA, able to ambulate >or= 200 feet with rollator for independent household ambulation, goal met       4) Pt will increase strength of bilateral LEs to 4+/5 or better in order to normalize function, climb stairs, and allow pt to independently transfer from various surfaces to standing without assistance.   Status at initial evaluation: strength of LEs grossly 4-/5 to 5/5 bilateral LEs  Status at last progress note (5/17/17): Progressing ( Hip flexion: 4-/5 bilaterally, Hip extension: 4/5 bilaterally, Hip adduction: 5/5 bilaterally, Hip abduction: 4+/5 bilaterally, Knee flexion: 4+/5 on right, 4-/5 on left, Knee extension: 4+/5 bilaterally, Ankle DF: 4+/5 bilaterally, Ankle PF: 4/5 on right, 4-/5 on left)  Current status: mixed progress (strength hip flexors: 4/5 right (improved 1/3 grade), 4-/5 left (no change), Hip extensors: 5/5 right (improved 1 grade), 4+/5 left (improved 1/3 grade), Hip adductors: 5/5 bilaterally (no change), Hip abductors: 4+/5 bilaterally (no change), Knee extensors: 4/5 bilaterally (decreased 1/3 grade bilaterally), Knee flexors: 4/5 right (decreased 1/3 grade), 4-/5 left (no change), Ankle DF: 4+/5 right (no change), 5/5 left (improved 1/3 grade)) 6/20/17  Status at D/C: LE strength ranging from 4-5-/5, progressing      5) FGA score will increase 4 points in order to indicate significant improvement in dynamic balance and decrease risk of falls. Status at initial evaluation: FGA: 5/30  Status at last progress note (5/17/17): FGA: 11/30 (improved 1 point since [facility])  Status at D/C: FGA 11/30, no further improvement, goal met    PLAN        [x]  Discharge due to:goals partially met and patient reaching plateau_  []  Other:_      Brooklynn Daugherty, PT 7/25/2017  2:33 PM    No future appointments.

## 2017-07-25 NOTE — PROGRESS NOTES
[facility] [address], [address] [place]  Phone: [phone]      Fax:  [phone]    Discharge Summary           Patient name: Gordy Velazco Start of Care: 17   Referral source: Fanny Auguste MD : 1925   Medical/Treatment Diagnosis: Dizziness [R42]  Weakness [R53.1] Onset Date: 2016   Prior Hospitalization: see medical history Provider#: [id number]   Medications: Verified on Patient Summary List     Comorbidities: UTI, HTN, visual impairment, hearing impairment, fall risk  Prior Level of Function:ambulation with rollator, requires assistance with ADLs except bathing and dressing    Visits from Start of Care: 32    Missed Visits: 4  Reporting Period : 6/15/17 to 17      Progress towards goals / Updated goals:Mrs. Leona Yepez has been showing progress in ambulatory balance, general strength and mobility. She has met 2 of 5 goals and has reached a plateau in regards to unmet goals. She is independent with an advanced HEP to address remaining deficits. I recommend discharge to HEP at this time. Long Term Goals (To be achieved in 4 weeks)  1) Increase strength of bilateral LEs by 1/3 grade in order to improve ability to transfer from various surfaces to standing without assistance.   Status at initial evaluation: strength of LEs grossly 4-/5 to 5/5 bilateral LEs  Status at last progress note (17): Progressing (Hip flexion: 4-/5 bilaterally, Hip extension: 4/5 bilaterally, Hip adduction: 5/5 bilaterally, Hip abduction: 4+/5 bilaterally, Knee flexion: 4+/5 on right, 4-/5 on left, Knee extension: 4+/5 bilaterally, Ankle DF: 4+/5 bilaterally, Ankle PF: 4/5 on right, 4-/5 on left)  Current status: mixed progress (strength hip flexors: 4/5 right (improved 1/3 grade), 4-/5 left (no change), Hip extensors: 5/5 right (improved 1 grade), 4+/5 left (improved 1/3 grade), Hip adductors: 5/5 bilaterally (no change), Hip abductors: 4+/5 bilaterally (no change), Knee extensors: 4/5 bilaterally (decreased 1/3 grade bilaterally), Knee flexors: 4/5 right (decreased 1/3 grade), 4-/5 left (no change), Ankle DF: 4+/5 right (no change), 5/5 left (improved 1/3 grade)) 6/20/17  Status at D/C: hip Flex righ 5-, left 4/5, knee Ext both 5/5, knee Flex right 5-/5, left 4+/5, ADD 5-/5, ABD 4/4, overall LE strength ranging from 4-5-/5 MMT, goal met      2 Improved DHI score to <or= 30 points as evidence of improved dizziness with ADL  Status at Eval: [address] [place] [address] 56/100  Status at last progress note (5/17/17): [address] [place] [address] 46 points, progressing  Current status:44/100, progressing      3) Pt will be able to demonstrate ability to ambulate with least restrictive assistive device x 200 feet with SBA or better in order to mobilize more independently. Status at initial evaluation: pt ambulating with rollator with SBA  Status at last progress note (5/17/17): MET (pt able to ambulate 230 feet using a rollator and SBA)  Current status: progressing with SPC, met with rollator (pt ambulated 140 feet with SPC with SBA) 6/20/17  Status at D/C: ability to ambulate with SC and CGA, able to ambulate >or= 200 feet with rollator for independent household ambulation, goal met       4) Pt will increase strength of bilateral LEs to 4+/5 or better in order to normalize function, climb stairs, and allow pt to independently transfer from various surfaces to standing without assistance.   Status at initial evaluation: strength of LEs grossly 4-/5 to 5/5 bilateral LEs  Status at last progress note (5/17/17): Progressing ( Hip flexion: 4-/5 bilaterally, Hip extension: 4/5 bilaterally, Hip adduction: 5/5 bilaterally, Hip abduction: 4+/5 bilaterally, Knee flexion: 4+/5 on right, 4-/5 on left, Knee extension: 4+/5 bilaterally, Ankle DF: 4+/5 bilaterally, Ankle PF: 4/5 on right, 4-/5 on left)  Current status: mixed progress (strength hip flexors: 4/5 right (improved 1/3 grade), 4-/5 left (no change), Hip extensors: 5/5 right (improved 1 grade), 4+/5 left (improved 1/3 grade), Hip adductors: 5/5 bilaterally (no change), Hip abductors: 4+/5 bilaterally (no change), Knee extensors: 4/5 bilaterally (decreased 1/3 grade bilaterally), Knee flexors: 4/5 right (decreased 1/3 grade), 4-/5 left (no change), Ankle DF: 4+/5 right (no change), 5/5 left (improved 1/3 grade)) 6/20/17  Status at D/C: LE strength ranging from 4-5-/5, progressing      5) FGA score will increase 4 points in order to indicate significant improvement in dynamic balance and decrease risk of falls. Status at initial evaluation: FGA: 5/30  Status at last progress note (5/17/17): FGA: 11/30 (improved 1 point since [facility])  Status at D/C: FGA 11/30, no further improvement, goal met    G-Codes (GP)  Mobility    Goal  CJ= 20-39%  D/C  CK= 40-59%      The severity rating is based on clinical judgment and the FOTO score.     Assessment/ Summary of Care: good progress, patient has reached plateau    RECOMMENDATIONS:  [x]Discontinue therapy: [x]Patient has reached or is progressing toward set goals      []Patient is non-compliant or has abdicated      []Due to lack of appreciable progress towards set goals    Connie Kwon, PT 7/25/2017 3:19 PM

## 2018-02-26 ENCOUNTER — HOSPITAL ENCOUNTER (OUTPATIENT)
Dept: PHYSICAL THERAPY | Age: 83
Discharge: HOME | End: 2018-02-26
Payer: MEDICARE

## 2018-02-26 PROCEDURE — 97162 PT EVAL MOD COMPLEX 30 MIN: CPT

## 2018-02-26 PROCEDURE — 97530 THERAPEUTIC ACTIVITIES: CPT

## 2018-02-26 PROCEDURE — G8978 MOBILITY CURRENT STATUS: HCPCS

## 2018-02-26 PROCEDURE — G8979 MOBILITY GOAL STATUS: HCPCS

## 2018-02-26 PROCEDURE — 97110 THERAPEUTIC EXERCISES: CPT

## 2018-02-26 NOTE — PROGRESS NOTES
PT DAILY TREATMENT NOTE/NEURO EVAL 3-16    Patient Name: Penelope Kaufman  Date:2018  : 1925  [x]  Patient  Verified  Payor: [place] MEDICARE / Plan: VA MEDICARE PART A & B / Product Type: Medicare /    In time:1:02  Out time:2:00  Total Treatment Time (min): 58  Total Timed Codes (min): 58  1:1 Treatment Time ( only): 62   Visit #: 1 of 12    Treatment Area: Bilateral leg weakness [R29.898]  Unstable gait [R26.81]    SUBJECTIVE  Pain Level (0-10 scale): 0  []constant [x]intermittent []improving []worsening []no change since onset    Any medication changes, allergies to medications, adverse drug reactions, diagnosis change, or new procedure performed?: [x] No    [] Yes (see summary sheet for update)  Subjective functional status/changes:     PLOF: walking short distances without AD, walking with Rollator mostly especially in the community, indep with ADLs  Limitations to PLOF: able to stand 5-10 before sitting, increased difficulty with standing, difficulty getting in/out bed  Mechanism of Injury: Wanted to come back to therapy over last month. Current symptoms/Complaints: Neck pain at night, describes as ache. Decreased balance with walking. 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 Be more secure.  \"    OBJECTIVE/EXAMINATION      38 min [x]Eval                  []Re-Eval       10 min Therapeutic Exercise:  [x] See flow sheet :   Rationale: increase ROM, increase strength and improve coordination to improve the patients ability to perform daily activities with decreased pain and symptom levels          With   [] TE   [x] TA -10'   [] neuro   [] other: Patient Education: [x] Review HEP    [] Progressed/Changed HEP based on:   [x] positioning   [x] body mechanics   [x] transfers   [] heat/ice application    [x] other: Pt educated on proper technique for sit to stands to increase ease, education on exam findings, POC, HEP dispensed with education on importance of compliance      Other Objective/Functional Measures: See initial eval    Physical Therapy Evaluation  Neurologic    Posture: [x] Poor    [] Fair    [] Good    Describe:       Gait: [] Normal    [x] Abnormal    Device:    Rollater  Describe:decreased ivet, increased UE use with rollator, forward flexed posture    ROM:                             AROM    PROM   Shoulder Left Right Left Right   Flex [facility] WFL     Ext       ABD       ER       IR                AROM    PROM   Knee Left Right Left Right   Ext [facility]     Flex [facility] WFL               AROM                           PROM  Hip Left Right Left Right   Flex WFL WFL     Ext       ABD       ER       IR                                                AROM      PROM   Ankle Left Right Left Right   Ext       Flex         Strength (MMT):  Shoulder L (1-5) R (1-5)   Shoulder Flexion 3+ 3+   Shoulder Ext     Shoulder ABD     Elbow flexion  4 4   Elbow extension  4 4   Shoulder ER                                               Hip L (1-5) R (1-5)   Hip Flexion 4 4   Hip Ext     Hip ABD     Hip ADD     Hip ER     Hip IR       Knee L (1-5) R (1-5)   Knee Flexion 4 4   Knee Extension 4 4   Ankle PF     Ankle DF     Other       Tone: WFL    Motor Control:    Sensation:    Reflexes: [x] Not Tested   Left Right   Biceps (C5)     Brachioradiais (C6)     Triceps (C7)     Knee Jerk (L4)     Ankle Jerk (SI)       Balance/ Equilibrium:              Left            Right  Tracks Across Midline      Reaches Across Midline           Sitting Balance: Static:  [x] Good    [] Fair    [] Poor     Dynamic:   [] Good    [x] Fair    [] Poor        Standing Balance: Static:   [x] Good    [] Fair    [] Poor     Dynamic:   [] Good    [] Fair    [x] Poor        Protective Extension:  [] Present    [x] Delayed    [] Absent        Single Leg Stance:         Eyes Open  Eyes Closed   L  L    R  R        Functional Mobility      Bed Mobility:  Increased time, mod I     Scooting:        Rolling:       Sit-Supine:      Transfers:       Sit-Stand:       Floor-Stand:           Stairs: alternating pattern, 2 HR, Supervision     Behavior: [x] Cooperative    [] Impulsive    [] Agitated    [] Perseverative    [] Confused   Oriented x:    Cognition: [] One Step Commands   [x] Multiple Commands   [] Displays Neglect [] R  [] L    Other:       Impaired Judgement: [] Y    [x] N      Impaired Vision:  [] Y    [x] N      Safety Awareness Deficits  [] Y    [x] N      Impaired Hearing  [x] Y    [] N      Able to Express Needs [x] Y    [] N    Optional Tests:       Functional Gait Assessment (30pt scale): 13/30         Other test /comments:  5x sit to stand: 33.24sec, bilateral UE use  Rhomberg: compliant negative, non compliant <10 seconds EO/EC    Pain Level (0-10 scale) post treatment: 0    ASSESSMENT/Changes in Function: Pt is a 81 yo female presenting to therapy with generalized weakness and decreased balance. Pt currently ambulates with Rollator with decreased ivet and forward flexed posture. Pt is independent with ADLs however needs assistance with dishes and heavier household chores. Increased time needed to complete transfers such as sit<>supine and sit<>stand with max UE assist. Pt c/o neck pain mostly at night with vertigo like symptoms with change in positions. Pt demonstrates decreased cervical ROM, UE andLE strength, standing endurance, positive Rhomberg on non compliant surfaces, decreased balance with FGA score 13/30, and decreased tranfers ability with 5x sit to  33.24seconds. Pt would benefit from skilled PT servcies to address the above deficits to allow pt to complete ADLs, transfers and ambulate with increased ease.      Patient will continue to benefit from skilled PT services to modify and progress therapeutic interventions, address functional mobility deficits, address strength deficits, analyze and modify body mechanics/ergonomics, assess and modify postural abnormalities, address imbalance/dizziness and instruct in home and community integration to attain remaining goals. []  See Plan of Care  []  See progress note/recertification  []  See Discharge Summary         Progress towards goals / Updated goals:  Short Term Goals: STG- To be accomplished in 3 week(s):  1. Pt will be independent with HEP to encourage prophylaxis. Eval:HEP dispensed  Current: NA     2. Pt will be able to stand > 15 minutes with rest break to wash dishes and complete ADLs with increased ease. Eval: 5-10 minutes  Current: NA    Long Term Goals: LTG- To be accomplished in 6 week(s):  1. Pt will demonstrate ability to complete 5x sit to stands in >20 seconds without UE support to demonstrate improved LE strength. .  Eval:33.24 seconds with bilateral UE assist   Current: NA    2. Pt FGA score will improve >/= 4 points to indicate improved functional balance and decrease fall risk  Eval:13/30  Current: NA    3. Pt bilateral LE strength will improve to >/= 4+/5 to allow pt to complete transfers with increased ease. Eval: 4/5 bilateral LE  Current: NA    4. Pt FOTO score will increase by >/=6 points to show improvement in subjective function.   Eval:52  Current: will address at visit 5      PLAN  []  Upgrade activities as tolerated     [x]  Continue plan of care  []  Update interventions per flow sheet       []  Discharge due to:_  []  Other:_      Adam Corrigan 2/26/2018  1:04 PM

## 2018-03-02 ENCOUNTER — HOSPITAL ENCOUNTER (OUTPATIENT)
Dept: PHYSICAL THERAPY | Age: 83
Discharge: HOME | End: 2018-03-02
Payer: MEDICARE

## 2018-03-02 PROCEDURE — 97116 GAIT TRAINING THERAPY: CPT

## 2018-03-02 PROCEDURE — 97110 THERAPEUTIC EXERCISES: CPT

## 2018-03-02 PROCEDURE — 97112 NEUROMUSCULAR REEDUCATION: CPT

## 2018-03-02 NOTE — PROGRESS NOTES
PT DAILY TREATMENT NOTE - [facility]     Patient Name: Radha Gamboa  Date:3/2/2018  : 1925  [x]  Patient  Verified  Payor: [place] MEDICARE / Plan: [place] MEDICARE PART A & B / Product Type: Medicare /    In time:105  Out time:2  Total Treatment Time (min): 55  Total Timed Codes (min): 55  1:1 Treatment Time ( [address] only): 40   Visit #: 2 of 12    Treatment Area: Bilateral leg weakness [R29.898]  Unstable gait [R26.81]    SUBJECTIVE  Pain Level (0-10 scale): 0  Any medication changes, allergies to medications, adverse drug reactions, diagnosis change, or new procedure performed?: [x] No    [] Yes (see summary sheet for update)  Subjective functional status/changes:   [] No changes reported  I am kind of depressed. Don't feel like doing anything.     OBJECTIVE            25 min Therapeutic Exercise:  [x] See flow sheet :general strength   Rationale: increase ROM, increase strength and improve balance to improve the patients ability to perform basic ADL/ambulation     min Therapeutic Activity:  []  See flow sheet :        20 min Neuromuscular Re-education:  [x]  See flow sheet :balance activities including static stance with small HAL, WS, worked on sit to stance transfer with fwd WS   Rationale: increase ROM, increase strength, improve balance and increase proprioception  to improve the patients ability to ambulate without fear of falling          10 min Gait Trainin laps in gym with mod assist on flat surface and stairs   Rationale:improve ambulatory balance          With   [] TE   [] TA   [] neuro   [] other: Patient Education: [x] Review HEP    [] Progressed/Changed HEP based on:   [] positioning   [] body mechanics   [] transfers   [] heat/ice application    [] other:      Other Objective/Functional Measures: fear of falling, patient needs constant verbal encouragement, using wall and furniture to steady herself wherever possible  Good tolerance to overhead reaching but continues with habitual TS kyphosis and flexed walking posture using RW     Pain Level (0-10 scale) post treatment: 0    ASSESSMENT/Changes in Function: good tolerance to activities, very unsteady, fall risk!!! Patient in better spirits post PT  Patient will continue to benefit from skilled PT services to address ROM deficits, address strength deficits, analyze and cue movement patterns, assess and modify postural abnormalities and address imbalance/dizziness to attain remaining goals. [x]  See Plan of Care  []  See progress note/recertification  []  See Discharge Summary         Progress towards goals / Updated goals:  Short Term Goals: STG- To be accomplished in 3 week(s):  1.  Pt will be independent with HEP to encourage prophylaxis. Eval:HEP dispensed  Current: NA       2. Pt will be able to stand > 15 minutes with rest break to wash dishes and complete ADLs with increased ease. Eval: 5-10 minutes  Current: NA      Long Term Goals: LTG- To be accomplished in 6 week(s):  1.  Pt will demonstrate ability to complete 5x sit to stands in >20 seconds without UE support to demonstrate improved LE strength. .  Eval:33.24 seconds with bilateral UE assist   Current: NA      2.  Pt FGA score will improve >/= 4 points to indicate improved functional balance and decrease fall risk  Eval:13/30  Current: NA      3.  Pt bilateral LE strength will improve to >/= 4+/5 to allow pt to complete transfers with increased ease. Eval: 4/5 bilateral LE  Current: NA      4.  Pt FOTO score will increase by >/=6 points to show improvement in subjective function.   Eval:52  Current: will address at visit 5    PLAN  []  Upgrade activities as tolerated     [x]  Continue plan of care  []  Update interventions per flow sheet       []  Discharge due to:_  []  Other:_      Major Josse, PT 3/2/2018  1:14 PM    Future Appointments  Date Time Provider Niraj Light   3/6/2018 2:30 PM Richard Reasons [facility]   3/8/2018 2:30 PM Richard Reasons [facility]   3/13/2018 2:00 PM Alondra Parada [facility]   3/15/2018 2:00 PM Alondra Parada [facility]   3/20/2018 2:00 PM Alondra Parada [facility]   3/22/2018 2:00 PM Alondra Parada [facility]   3/27/2018 2:00 PM Alondra Parada [facility]   3/29/2018 2:00 PM Carmen Carranza [facility]

## 2018-03-06 ENCOUNTER — HOSPITAL ENCOUNTER (OUTPATIENT)
Dept: PHYSICAL THERAPY | Age: 83
Discharge: HOME | End: 2018-03-06
Payer: MEDICARE

## 2018-03-06 PROCEDURE — 97530 THERAPEUTIC ACTIVITIES: CPT

## 2018-03-06 PROCEDURE — 97110 THERAPEUTIC EXERCISES: CPT

## 2018-03-06 NOTE — PROGRESS NOTES
PT DAILY TREATMENT NOTE - [facility]     Patient Name: Nabil Situ  Date:3/6/2018  : 1925  [x]  Patient  Verified  Payor: VA MEDICARE / Plan: VA MEDICARE PART A & B / Product Type: Medicare /    In time:2:30  Out time:3:30  Total Treatment Time (min): 60  Total Timed Codes (min): 60  1:1 Treatment Time ( [address] only): 39   Visit #: 3 of 12    Treatment Area: Bilateral leg weakness [R29.898]  Unstable gait [R26.81]    SUBJECTIVE  Pain Level (0-10 scale): 0  Any medication changes, allergies to medications, adverse drug reactions, diagnosis change, or new procedure performed?: [x] No    [] Yes (see summary sheet for update)  Subjective functional status/changes:   [] No changes reported  \"I feel like I can;t hear as well today. \"    OBJECTIVE        30 min Therapeutic Exercise:  [x] See flow sheet :   Rationale: increase ROM, increase strength and improve coordination to improve the patients ability to perform daily activities with decreased pain and symptom levels    30 min Therapeutic Activity:  [x]  See flow sheet : ambulate in clinic with 1 HHA mod A, sidestepping  And backwards walking in // bars, stairs, sit to stands, marches on foam   Rationale: increase ROM, increase strength, improve coordination, improve balance and increase proprioception  to improve the patients ability to perform daily activities with decreased pain and symptom levels     With   [] TE   [] TA   [] neuro   [] other: Patient Education: [x] Review HEP    [] Progressed/Changed HEP based on:   [] positioning   [] body mechanics   [] transfers   [] heat/ice application    [] other:      Other Objective/Functional Measures:   Improved ivet with HHA on right, frequent cues to not reach for equipment in clinic  Alternating steps with stairs using both HR   Sit to stand x5 with 1 UE assist     Pain Level (0-10 scale) post treatment: 0    ASSESSMENT/Changes in Function: Continues to demonstrate forward flexed posture with ambulation even wihtout AD. Cues to decrease reaching for equipment with ambulation in clinic. Sit to stands still difficult to complete with UE assit. Patient will continue to benefit from skilled PT services to modify and progress therapeutic interventions, address functional mobility deficits, address strength deficits, analyze and modify body mechanics/ergonomics, address imbalance/dizziness and instruct in home and community integration to attain remaining goals. []  See Plan of Care  []  See progress note/recertification  []  See Discharge Summary         Progress towards goals / Updated goals:  Short Term Goals: STG- To be accomplished in 3 week(s):  1.  Pt will be independent with HEP to encourage prophylaxis. Eval:HEP dispensed  Current: Poor compliance       2. Pt will be able to stand > 15 minutes with rest break to wash dishes and complete ADLs with increased ease. Eval: 5-10 minutes  Current: NA      Long Term Goals: LTG- To be accomplished in 6 week(s):  1.  Pt will demonstrate ability to complete 5x sit to stands in >20 seconds without UE support to demonstrate improved LE strength. .  Eval:33.24 seconds with bilateral UE assist   Current: 5x with 1 UE assist, increased time      2.  Pt FGA score will improve >/= 4 points to indicate improved functional balance and decrease fall risk  Eval:13/30  Current: NA      3.  Pt bilateral LE strength will improve to >/= 4+/5 to allow pt to complete transfers with increased ease. Eval: 4/5 bilateral LE  Current: NA      4.  Pt FOTO score will increase by >/=6 points to show improvement in subjective function.   Eval:52  Current: will address at visit 5    PLAN  [x]  Upgrade activities as tolerated     [x]  Continue plan of care  []  Update interventions per flow sheet       []  Discharge due to:_  []  Other:_      Ben Ca 3/6/2018  2:32 PM    Future Appointments  Date Time Provider Niraj Light   3/8/2018 2:30 PM Ben Ca [facility] 3/13/2018 3:30 PM Cordella Rio Remesic [facility] [address]   3/15/2018 1:00 PM Cordella Kugel [facility] [address]   3/20/2018 2:00 PM Florie Phoenix [facility] [address]   3/22/2018 2:00 PM Florie Phoenix [facility] [address]   3/27/2018 2:00 PM Florie Phoenix [facility] [address]   3/29/2018 2:00 PM Cordella Kugel [facility] [address]

## 2018-03-08 ENCOUNTER — HOSPITAL ENCOUNTER (OUTPATIENT)
Dept: PHYSICAL THERAPY | Age: 83
Discharge: HOME | End: 2018-03-08
Payer: MEDICARE

## 2018-03-08 PROCEDURE — 97110 THERAPEUTIC EXERCISES: CPT

## 2018-03-08 PROCEDURE — 97530 THERAPEUTIC ACTIVITIES: CPT

## 2018-03-08 NOTE — PROGRESS NOTES
PT DAILY TREATMENT NOTE - [facility]     Patient Name: Macie Bolanos  Date:3/8/2018  : 1925  [x]  Patient  Verified  Payor: VA MEDICARE / Plan: VA MEDICARE PART A & B / Product Type: Medicare /    In time:2:36  Out time:3:30  Total Treatment Time (min): 54  Total Timed Codes (min): 54  1:1 Treatment Time ( [address] only): 52   Visit #: 4 of 12    Treatment Area: Bilateral leg weakness [R29.898]  Unstable gait [R26.81]    SUBJECTIVE  Pain Level (0-10 scale): 0  Any medication changes, allergies to medications, adverse drug reactions, diagnosis change, or new procedure performed?: [x] No    [] Yes (see summary sheet for update)  Subjective functional status/changes:   [] No changes reported  \"I usually walk without shoes in my house so it's harder in here. \"    OBJECTIVE    39 min Therapeutic Exercise:  [x] See flow sheet :   Rationale: increase ROM, increase strength and improve coordination to improve the patients ability to perform daily activities with decreased pain and symptom levels    15 min Therapeutic Activity:  [x]  See flow sheet : ambulation in clinc with HHA, sit to stands, supine<>sit transfer technique   Rationale: increase ROM, increase strength, improve coordination, improve balance and increase proprioception  to improve the patients ability to perform daily activities with decreased pain and symptom levels           With   [] TE   [] TA   [] neuro   [] other: Patient Education: [x] Review HEP    [] Progressed/Changed HEP based on:   [] positioning   [] body mechanics   [] transfers   [] heat/ice application    [] other:      Other Objective/Functional Measures:   Min to mod A HHA with ambulation in clinic needing VC to not reach for equipment     Pain Level (0-10 scale) post treatment: 0    ASSESSMENT/Changes in Function: Increased time with sit<>Supine transfers. Able to complete sit to stands from raised height without UE assist. Inconsistent ivet with ambulation.      Patient will continue to benefit from skilled PT services to modify and progress therapeutic interventions, address functional mobility deficits, address strength deficits, analyze and modify body mechanics/ergonomics, assess and modify postural abnormalities, address imbalance/dizziness and instruct in home and community integration to attain remaining goals. []  See Plan of Care  []  See progress note/recertification  []  See Discharge Summary         Progress towards goals / Updated goals:  Short Term Goals: STG- To be accomplished in 3 week(s):  1.  Pt will be independent with HEP to encourage prophylaxis. Eval:HEP dispensed  Current: Poor compliance       2. Pt will be able to stand > 15 minutes with rest break to wash dishes and complete ADLs with increased ease. Eval: 5-10 minutes  Current: no change      Long Term Goals: LTG- To be accomplished in 6 week(s):  1.  Pt will demonstrate ability to complete 5x sit to stands in >20 seconds without UE support to demonstrate improved LE strength. .  Eval:33.24 seconds with bilateral UE assist   Current: 5x with 1 UE assist, increased time      2.  Pt FGA score will improve >/= 4 points to indicate improved functional balance and decrease fall risk  Eval:13/30  Current: NA      3.  Pt bilateral LE strength will improve to >/= 4+/5 to allow pt to complete transfers with increased ease. Eval: 4/5 bilateral LE  Current: NA      4.  Pt FOTO score will increase by >/=6 points to show improvement in subjective function.   Eval:52  Current: will address at visit 5    PLAN  [x]  Upgrade activities as tolerated     [x]  Continue plan of care  []  Update interventions per flow sheet       []  Discharge due to:_  []  Other:_      Kip Linton [facility] 3/8/2018  2:49 PM    Future Appointments  Date Time Provider Niraj Light   3/13/2018 3:30 PM Gillian Keep [facility]PTD [facility]   3/15/2018 1:00 PM Gillian Keep [facility]PTD [facility]   3/20/2018 2:00 PM Gillian Keep MI[facility]PTD [facility]   3/22/2018 2:00 [facility]   3/27/2018 2:00 PM Nan Flatness Remesic [facility]   3/29/2018 2:00 PM Nan Flatness Remesic [facility]PTBER[facility]ARDO [facility]

## 2018-03-13 ENCOUNTER — HOSPITAL ENCOUNTER (OUTPATIENT)
Dept: PHYSICAL THERAPY | Age: 83
Discharge: HOME | End: 2018-03-13
Payer: MEDICARE

## 2018-03-13 PROCEDURE — 97530 THERAPEUTIC ACTIVITIES: CPT

## 2018-03-13 PROCEDURE — 97110 THERAPEUTIC EXERCISES: CPT

## 2018-03-13 NOTE — PROGRESS NOTES
PT DAILY TREATMENT NOTE - [facility]     Patient Name: Cata Sanon  Date:3/13/2018  : 1925  [x]  Patient  Verified  Payor: VA MEDICARE / Plan: VA MEDICARE PART A & B / Product Type: Medicare /    In time:3:32  Out time:4:30  Total Treatment Time (min): 58  Total Timed Codes (min): 58  1:1 Treatment Time ([facility] only): 35   Visit #: 5 of 12    Treatment Area: Bilateral leg weakness [R29.898]  Unstable gait [R26.81]    SUBJECTIVE  Pain Level (0-10 scale): 0  Any medication changes, allergies to medications, adverse drug reactions, diagnosis change, or new procedure performed?: [x] No    [] Yes (see summary sheet for update)  Subjective functional status/changes:   [] No changes reported  I wish I could walk without my walker. OBJECTIVE      43 min Therapeutic Exercise:  [x] See flow sheet :   Rationale: increase ROM, increase strength and improve coordination to improve the patients ability to perform daily activities with decreased pain and symptom levels    15 min Therapeutic Activity:  [x]  See flow sheet : amb 2 laps around clinic with HHA, sidestepping and backwards walking in bars. Rationale: increase ROM, increase strength, improve coordination, improve balance and increase proprioception  to improve the patients ability to perform daily activities with decreased pain and symptom levels           With   [] TE   [] TA   [] neuro   [] other: Patient Education: [x] Review HEP    [] Progressed/Changed HEP based on:   [] positioning   [] body mechanics   [] transfers   [] heat/ice application    [] other:      Other Objective/Functional Measures:   FOTO score 64/100  Inconsistent ivet with HHA on right with amb      Pain Level (0-10 scale) post treatment: 0    ASSESSMENT/Changes in Function: Pt continues to demonstrate decreased ivet with amb with HHA however decreased reaching for equipment today. Cues needed to stay on task and rest breaks for standing activities.      Patient will continue to benefit from skilled PT services to modify and progress therapeutic interventions, address functional mobility deficits, address strength deficits, assess and modify postural abnormalities, address imbalance/dizziness and instruct in home and community integration to attain remaining goals. []  See Plan of Care  []  See progress note/recertification  []  See Discharge Summary         Progress towards goals / Updated goals:  Short Term Goals: STG- To be accomplished in 3 week(s):  1.  Pt will be independent with HEP to encourage prophylaxis. Eval:HEP dispensed  Current: Poor compliance       2. Pt will be able to stand > 15 minutes with rest break to wash dishes and complete ADLs with increased ease. Eval: 5-10 minutes  Current: no change      Long Term Goals: LTG- To be accomplished in 6 week(s):  1.  Pt will demonstrate ability to complete 5x sit to stands in >20 seconds without UE support to demonstrate improved LE strength. .  Eval:33.24 seconds with bilateral UE assist   Current: 5x with 1 UE assist, increased time      2.  Pt FGA score will improve >/= 4 points to indicate improved functional balance and decrease fall risk  Eval:13/30  Current: NA      3.  Pt bilateral LE strength will improve to >/= 4+/5 to allow pt to complete transfers with increased ease. Eval: 4/5 bilateral LE  Current: NT      4.  Pt FOTO score will increase by >/=6 points to show improvement in subjective function.   Eval:52  Current: 64 /100 goal Met       PLAN   [x]  Upgrade activities as tolerated     [x]  Continue plan of care  []  Update interventions per flow sheet       []  Discharge due to:_  []  Other:_      Lois Carranza 3/13/2018  3:39 PM    Future Appointments  Date Time Provider Niraj Light   3/15/2018 1:00 PM [facility]   3/20/2018 2:00 PM Arnold Ugalde [facility]   3/22/2018 2:00 PM Arnold Ugalde [facility]AT[facility]W [facility]   3/27/2018 2:00 PM Arnold COOPER THE [facility]   3/29/2018 2:00 PM Stefany Chairez A Remesic [facility]

## 2018-03-15 ENCOUNTER — HOSPITAL ENCOUNTER (OUTPATIENT)
Dept: PHYSICAL THERAPY | Age: 83
Discharge: HOME | End: 2018-03-15
Payer: MEDICARE

## 2018-03-15 PROCEDURE — 97530 THERAPEUTIC ACTIVITIES: CPT

## 2018-03-15 PROCEDURE — 97110 THERAPEUTIC EXERCISES: CPT

## 2018-03-15 NOTE — PROGRESS NOTES
PT DAILY TREATMENT NOTE - [facility]     Patient Name: Penelope Kaufman  Date:3/15/2018  : 1925  [x]  Patient  Verified  Payor: [place] MEDICARE / Plan: VA MEDICARE PART A & B / Product Type: Medicare /    In time:1:00  Out time:2:02  Total Treatment Time (min): 62  Total Timed Codes (min): 62  1:1 Treatment Time ([facility] only): 35   Visit #: 6 of 12    Treatment Area: Bilateral leg weakness [R29.898]  Unstable gait [R26.81]    SUBJECTIVE  Pain Level (0-10 scale): 0  Any medication changes, allergies to medications, adverse drug reactions, diagnosis change, or new procedure performed?: [x] No    [] Yes (see summary sheet for update)  Subjective functional status/changes:   [] No changes reported  \"I'm tired from my doctors appointment earlier today. I had my annual checkup. \"    OBJECTIVE      42 min Therapeutic Exercise:  [x] See flow sheet :   Rationale: increase ROM, increase strength and improve coordination to improve the patients ability to perform daily activities with decreased pain and symptom levels    20 min Therapeutic Activity:  [x]  See flow sheet :   Rationale: increase ROM, increase strength, improve coordination, improve balance and increase proprioception  to improve the patients ability to perform daily activities with decreased pain and symptom levels           With   [] TE   [] TA   [] neuro   [] other: Patient Education: [x] Review HEP    [] Progressed/Changed HEP based on:   [] positioning   [] body mechanics   [] transfers   [] heat/ice application    [] other:      Other Objective/Functional Measures:   Increased ivet and upright posture with HHA ambulation today  Decreased rest breaks     Pain Level (0-10 scale) post treatment: 0    ASSESSMENT/Changes in Function: 5x sit to stand score has improved to less than 30sec however continued use of both arm rests to stand up. Improved ivet with ambulating 2 laps in clinic today with more upright posture and no reaching for equipment. Added SLR and HS curls with LAQ to strengthen bilateral LE. Patient will continue to benefit from skilled PT services to modify and progress therapeutic interventions, address functional mobility deficits, address strength deficits, analyze and modify body mechanics/ergonomics, assess and modify postural abnormalities, address imbalance/dizziness and instruct in home and community integration to attain remaining goals. []  See Plan of Care  []  See progress note/recertification  []  See Discharge Summary         Progress towards goals / Updated goals:  Short Term Goals: STG- To be accomplished in 3 week(s):  1.  Pt will be independent with HEP to encourage prophylaxis. Eval:HEP dispensed  Current: Poor compliance       2. Pt will be able to stand > 15 minutes with rest break to wash dishes and complete ADLs with increased ease. Eval: 5-10 minutes  Current: no change      Long Term Goals: LTG- To be accomplished in 6 week(s):  1.  Pt will demonstrate ability to complete 5x sit to stands in >20 seconds without UE support to demonstrate improved LE strength. .  Eval:33.24 seconds with bilateral UE assist   Current: 26.67 sec with both UE progressing       2.  Pt FGA score will improve >/= 4 points to indicate improved functional balance and decrease fall risk  Eval:13/30  Current: NA      3.  Pt bilateral LE strength will improve to >/= 4+/5 to allow pt to complete transfers with increased ease. Eval: 4/5 bilateral LE  Current: NT      4.  Pt FOTO score will increase by >/=6 points to show improvement in subjective function.   Eval:52  Current: 64 /100 goal Met       PLAN  [x]  Upgrade activities as tolerated     [x]  Continue plan of care  []  Update interventions per flow sheet       []  Discharge due to:_  []  Other:_      Marilin Giordano 3/15/2018  1:06 PM    Future Appointments  Date Time Provider Niraj Light   3/20/2018 2:00 PM Marilin COOPER [facility]   3/22/2018 2:00 [facility]   3/27/2018 2:00 PM [facility]   3/29/2018 2:00 PM Gulshan COOPER THE [facility]

## 2018-03-20 ENCOUNTER — HOSPITAL ENCOUNTER (OUTPATIENT)
Dept: PHYSICAL THERAPY | Age: 83
Discharge: HOME | End: 2018-03-20
Payer: MEDICARE

## 2018-03-20 PROCEDURE — 97112 NEUROMUSCULAR REEDUCATION: CPT

## 2018-03-20 PROCEDURE — 97110 THERAPEUTIC EXERCISES: CPT

## 2018-03-20 NOTE — PROGRESS NOTES
PT DAILY TREATMENT NOTE - [facility]     Patient Name: Andre Adkins  Date:3/20/2018  : 1925  [x]  Patient  Verified  [place]or: [place] MED[place]ARE / Plan: [place] MEDICARE PART A & B / Product Type: Medicare /    In time:1:00  Out time:1:55  Total Treatment Time (min): 55  Total Timed Codes (min): 55  1:1 Treatment Time ( [address] only): 35   Visit #: 7 of 12    Treatment Area: Bilateral leg weakness [R29.898]  Unstable gait [R26.81]    SUBJECTIVE  Pain Level (0-10 scale): 0  Any medication changes, allergies to medications, adverse drug reactions, diagnosis change, or new procedure performed?: [x] No    [] Yes (see summary sheet for update)  Subjective functional status/changes:   [] No changes reported  \"Everyone says that I am walking better. \"    OBJECTIVE    35 min Therapeutic Exercise:  [x] See flow sheet :   Rationale: increase ROM, increase strength and improve coordination to improve the patients ability to perform daily activities with decreased pain and symptom levels    20 min Neuromuscular Re-education:  [x]  See flow sheet : Pryor balance and FGA re-test    Rationale: increase ROM, increase strength, improve coordination, improve balance and increase proprioception  to improve the patients ability to perform daily activities with decreased pain and symptom levels          With   [] TE   [] TA   [] neuro   [] other: Patient Education: [x] Review HEP    [] Progressed/Changed HEP based on:   [] positioning   [] body mechanics   [] transfers   [] heat/ice application    [] other:      Other Objective/Functional Measures:   Pryor Balance Score 33/56  FGA score 13/30 - no change     Pain Level (0-10 scale) post treatment: 0    ASSESSMENT/Changes in Function: No change in FGA score noted however improved overall ivet with RW with more upright posture. Pryor Balance score of 33/56 today with most challenged with transfers and balance activities including SL, tandem stance and turning.  Continues to rely heavily on UE use with sit to stands. Patient will continue to benefit from skilled PT services to modify and progress therapeutic interventions, address functional mobility deficits, address strength deficits, analyze and modify body mechanics/ergonomics, address imbalance/dizziness and instruct in home and community integration to attain remaining goals. []  See Plan of Care  []  See progress note/recertification  []  See Discharge Summary         Progress towards goals / Updated goals:  Short Term Goals: STG- To be accomplished in 3 week(s):  1.  Pt will be independent with HEP to encourage prophylaxis. Eval:HEP dispensed  Current: Poor compliance       2. Pt will be able to stand > 15 minutes with rest break to wash dishes and complete ADLs with increased ease. Eval: 5-10 minutes  Current: 10 min before rest break progressing       Long Term Goals: LTG- To be accomplished in 6 week(s):  1.  Pt will demonstrate ability to complete 5x sit to stands in >20 seconds without UE support to demonstrate improved LE strength. .  Eval:33.24 seconds with bilateral UE assist   Current: 26.67 sec with both UE progressing       2.  Pt FGA score will improve >/= 4 points to indicate improved functional balance and decrease fall risk  Eval:13/30  Current: 13/30 no change      3.  Pt bilateral LE strength will improve to >/= 4+/5 to allow pt to complete transfers with increased ease. Eval: 4/5 bilateral LE  Current: NT      4.  Pt FOTO score will increase by >/=6 points to show improvement in subjective function.   Eval:52  Current: 64 /100 goal Met         PLAN  [x]  Upgrade activities as tolerated     [x]  Continue plan of care  []  Update interventions per flow sheet       []  Discharge due to:_  []  Other:_      [facility] 3/20/2018  2:04 PM    Future Appointments  Date Time Provider Niraj Light   3/22/2018 2:00 PM Arnold COOPER [facility]   3/27/2018 2:00 PM Arnold COOPER THE [facility]   3/29/2018 2:00 PM Lois Cheek Tere PAN [facility]

## 2018-03-22 ENCOUNTER — HOSPITAL ENCOUNTER (OUTPATIENT)
Dept: PHYSICAL THERAPY | Age: 83
Discharge: HOME | End: 2018-03-22
Payer: MEDICARE

## 2018-03-22 PROCEDURE — 97110 THERAPEUTIC EXERCISES: CPT

## 2018-03-22 NOTE — PROGRESS NOTES
PT DAILY TREATMENT NOTE     Patient Name: Rosa Willis  Date:3/22/2018  : 1925  [x]  Patient  Verified  Payor: [place] MEDICARE / Plan: [place] MEDICARE PART A & B / Product Type: Medicare /    In time:2:00  Out time:2:55  Total Treatment Time (min): 55  Total Timed Codes (min): 55  1:1 Treatment Time ( [address] only): 35   Visit #: 8 of 12    Treatment Area: Bilateral leg weakness [R29.898]  Unstable gait [R26.81]    SUBJECTIVE  Pain Level (0-10 scale): 0  Any medication changes, allergies to medications, adverse drug reactions, diagnosis change, or new procedure performed?: [x] No    [] Yes (see summary sheet for update)  Subjective functional status/changes:   [] No changes reported  \"I feel like I am getting stronger but today I don;t.\"    OBJECTIVE      55 min Therapeutic Exercise:  [x] See flow sheet :   Rationale: increase ROM, increase strength and improve coordination to improve the patients ability to perform daily activities with decreased pain and symptom levels          With   [] TE   [] TA   [] neuro   [] other: Patient Education: [x] Review HEP    [] Progressed/Changed HEP based on:   [] positioning   [] body mechanics   [] transfers   [] heat/ice application    [] other:      Other Objective/Functional Measures:   Hip flexion strength 3+/5 bilaterally  fatgiue with step ups today     Pain Level (0-10 scale) post treatment: 0    ASSESSMENT/Changes in Function: LE strength is improving however decreased strength noted in hip flexors with fatigue during step ups as well. Cues to decrease forward flexion with ambulation with rollator. Patient will continue to benefit from skilled PT services to modify and progress therapeutic interventions, address functional mobility deficits, address strength deficits, analyze and cue movement patterns, analyze and modify body mechanics/ergonomics, address imbalance/dizziness and instruct in home and community integration to attain remaining goals.      [] See Plan of Care  []  See progress note/recertification  []  See Discharge Summary         Progress towards goals / Updated goals:  Short Term Goals: STG- To be accomplished in 3 week(s):  1.  Pt will be independent with HEP to encourage prophylaxis. Eval:HEP dispensed  Current: Poor compliance       2. Pt will be able to stand > 15 minutes with rest break to wash dishes and complete ADLs with increased ease. Eval: 5-10 minutes  Current: 10 min before rest break progressing       Long Term Goals: LTG- To be accomplished in 6 week(s):  1.  Pt will demonstrate ability to complete 5x sit to stands in >20 seconds without UE support to demonstrate improved LE strength. .  Eval:33.24 seconds with bilateral UE assist   Current: 26.67 sec with both UE progressing       2.  Pt FGA score will improve >/= 4 points to indicate improved functional balance and decrease fall risk  Eval:13/30  Current: 13/30 no change      3.  Pt bilateral LE strength will improve to >/= 4+/5 to allow pt to complete transfers with increased ease. Eval: 4/5 bilateral LE  Current: 4/5 except 3+/5 hip flexion progressing       4.  Pt FOTO score will increase by >/=6 points to show improvement in subjective function.   Eval:52  Current: 64 /100 goal Met       PLAN  [x]  Upgrade activities as tolerated     [x]  Continue plan of care  []  Update interventions per flow sheet       []  Discharge due to:_  []  Other:_      Kip Royer [facility] 3/22/2018  2:16 PM    Future Appointments  Date Time Provider Niraj Light   3/27/2018 2:00 PM Gillian Guzmán [facility]   3/29/2018 2:00 PM Gillian Keep [facility]

## 2018-03-27 ENCOUNTER — HOSPITAL ENCOUNTER (OUTPATIENT)
Dept: PHYSICAL THERAPY | Age: 83
Discharge: HOME | End: 2018-03-27
Payer: MEDICARE

## 2018-03-27 PROCEDURE — G8979 MOBILITY GOAL STATUS: HCPCS

## 2018-03-27 PROCEDURE — 97110 THERAPEUTIC EXERCISES: CPT

## 2018-03-27 PROCEDURE — G8978 MOBILITY CURRENT STATUS: HCPCS

## 2018-03-27 NOTE — PROGRESS NOTES
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  Pt will be independent with HEP to encourage prophylaxis. Eval:HEP dispensed  Current: Poor compliance       2. Pt will be able to stand > 15 minutes with rest break to wash dishes and complete ADLs with increased ease. Eval: 5-10 minutes  Current: 10 min before rest break progressing       Long Term Goals: LTG- To be accomplished in 6 week(s):  1.  Pt will demonstrate ability to complete 5x sit to stands in >20 seconds without UE support to demonstrate improved LE strength. .  Eval:33.24 seconds with bilateral UE assist   Current: 26.67 sec with both UE progressing       2.  Pt FGA score will improve >/= 4 points to indicate improved functional balance and decrease fall risk  Eval:  Current:  no change      3.  Pt bilateral LE strength will improve to >/= 4+/5 to allow pt to complete transfers with increased ease.   Eval: 4/5 bilateral LE  Current: 4/5 except 3+/5 hip flexion progressing       4.  Pt FOTO score will increase by >/=6 points to show improvement in subjective function. Eval:52  Current: 64 /100 goal Met      5. New goal 3/27/18: Pt Pryor Balance score will improve by 6 points to demonstrate improved transfer ability and decreased risk for falls  Current : 33/56     Key functional changes: LE strength improvement except left hip flexors and gluts, increased ease with supine to sit with log roll technique, decreased time for 5x sit to stand      Problems/ barriers to goal attainment: decreased LE strength, balance, increased time for transfers     Problem List: decrease strength, impaired gait/ balance, decrease ADL/ functional abilitiies, decrease activity tolerance and decrease transfer abilities    Treatment Plan: Therapeutic exercise, Therapeutic activities, Neuromuscular re-education, Physical agent/modality, Gait/balance training, Patient education and Functional mobility training       Goals for this certification period to be accomplished in 4 weeks:  See unmet above    Frequency / Duration: Patient to be seen 2 times per week for 4 weeks:    Assessment / Recommendations: Pt is making progress towards goals with reporting 50% improvement since starting therapy. Continues to demonstrate LE weakness with left > right with decreased clearance of left foot with ambulation as well needing VC to correct. FGA score remains unchanged due to decreased ivet with tasks. Difficulty with transfers and balance with Pryor as well. Standing tolerance is improving slightly at home however still only 10 minutes before needing rest break. Pt would benefit from continued skilled PT servcies to address LE strength, balance, and transfers to increase ease with ADLs. G-Codes (GP)  Mobility  [id number] Current  CK= 40-59%  [id number] Goal  CK= 40-59%  The severity rating is based on clinical judgment and the FOTO score.     Certification Period: 2/26/18 - 4/23/18    Vaibhav MARX [facility] 3/27/2018 5:28 PM    ________________________________________________________________________  I certify that the above Therapy Services are being furnished while the patient is under my care. I agree with the treatment plan and certify that this therapy is necessary. [] I have read the above and request that my patient continue as recommended. [] I have read the above report and request that my patient continue therapy with the following changes/special instructions: _______________________________________  [] I have read the above report and request that my patient be discharged from therapy    Physician's Signature:________________________________Date:___________Time:__________    Please sign and return to [facility] at [facility].  Amado Shepard [address]  Phone: [phone] Fax: [phone]

## 2018-03-27 NOTE — PROGRESS NOTES
PT DAILY TREATMENT NOTE - [facility]     Patient Name: Wil Blas  Date:3/27/2018  : 1925  [x]  Patient  Verified  Payor: [place] MEDICARE / Plan: [place] MEDICARE PART A & B / Product Type: Medicare /    In time:2:02  Out time:3:01  Total Treatment Time (min): 59  Total Timed Codes (min): 59  1:1 Treatment Time ( [address] only): 35   Visit #: 9 of 12    Treatment Area: Bilateral leg weakness [R29.898]  Unstable gait [R26.81]    SUBJECTIVE  Pain Level (0-10 scale): 2-3   Any medication changes, allergies to medications, adverse drug reactions, diagnosis change, or new procedure performed?: [x] No    [] Yes (see summary sheet for update)  Subjective functional status/changes:   [] No changes reported  \"My neck hurts but I did it to myself. I feel 50% better. \"    OBJECTIVE      59 min Therapeutic Exercise:  [x] See flow sheet :   Rationale: increase ROM, increase strength and improve coordination to improve the patients ability to perform daily activities with decreased pain and symptom levels          With   [] TE   [] TA   [] neuro   [] other: Patient Education: [x] Review HEP    [] Progressed/Changed HEP based on:   [] positioning   [] body mechanics   [] transfers   [] heat/ice application    [] other:      Other Objective/Functional Measures:   Improved ivet with ambulation in clinic however decreased left foot clearance  Challenged with SLR and bridges  Education and demonstration of log roll technique     Pain Level (0-10 scale) post treatment: 0    ASSESSMENT/Changes in Function: Pt is making progress towards goals with reporting 50% improvement since starting therapy. Continues to demonstrate LE weakness with left > right with decreased clearance of left foot with ambulation as well needing VC to correct. FGA score remains unchanged due to decreased ivet with tasks. Difficulty with transfers and balance with Pryor as well.  Standing tolerance is improving slightly at home however still only 10 minutes before needing rest break. Pt would benefit from continued skilled PT servcies to address LE strength, balance, and transfers to increase ease with ADLs. Patient will continue to benefit from skilled PT services to modify and progress therapeutic interventions, address functional mobility deficits, address strength deficits, analyze and cue movement patterns, analyze and modify body mechanics/ergonomics, address imbalance/dizziness and instruct in home and community integration to attain remaining goals. []  See Plan of Care  []  See progress note/recertification  []  See Discharge Summary         Progress towards goals / Updated goals:  Short Term Goals: STG- To be accomplished in 3 week(s):  1.  Pt will be independent with HEP to encourage prophylaxis. Eval:HEP dispensed  Current: Poor compliance       2. Pt will be able to stand > 15 minutes with rest break to wash dishes and complete ADLs with increased ease. Eval: 5-10 minutes  Current: 10 min before rest break progressing       Long Term Goals: LTG- To be accomplished in 6 week(s):  1.  Pt will demonstrate ability to complete 5x sit to stands in >20 seconds without UE support to demonstrate improved LE strength. .  Eval:33.24 seconds with bilateral UE assist   Current: 26.67 sec with both UE progressing       2.  Pt FGA score will improve >/= 4 points to indicate improved functional balance and decrease fall risk  Eval:13/30  Current: 13/30 no change      3.  Pt bilateral LE strength will improve to >/= 4+/5 to allow pt to complete transfers with increased ease. Eval: 4/5 bilateral LE  Current: 4/5 except 3+/5 hip flexion progressing       4.  Pt FOTO score will increase by >/=6 points to show improvement in subjective function. Eval:52  Current: 64 /100 goal Met     5.  New goal 3/27/18: Pt Pryor Balance score will improve by 6 points to demonstrate improved transfer ability and decreased risk for falls  Current : 33/56     PLAN  [x]  Upgrade activities as tolerated     [x]  Continue plan of care  []  Update interventions per flow sheet       []  Discharge due to:_  []  Other:_      Emily Plasencia 3/27/2018  2:24 PM    Future Appointments  Date Time Provider Niraj Light   3/29/2018 2:00 PM Emily Plasencia [facility]

## 2018-03-29 ENCOUNTER — HOSPITAL ENCOUNTER (OUTPATIENT)
Dept: PHYSICAL THERAPY | Age: 83
Discharge: HOME | End: 2018-03-29
Payer: MEDICARE

## 2018-03-29 PROCEDURE — 97110 THERAPEUTIC EXERCISES: CPT

## 2018-03-29 NOTE — PROGRESS NOTES
PT DAILY TREATMENT NOTE - [facility]     Patient Name: Justen Chapa  Date:3/29/2018  : 1925  [x]  Patient  Verified  Payor: [place] MEDICARE / Plan: [place] MEDICARE PART A & B / Product Type: Medicare /    In time:2:06  Out time:3:00  Total Treatment Time (min): 54  Total Timed Codes (min): 54  1:1 Treatment Time ( [address] only): 35   Visit #: 10 of 20    Treatment Area: Bilateral leg weakness [R29.898]  Unstable gait [R26.81]    SUBJECTIVE  Pain Level (0-10 scale): 2 neck  Any medication changes, allergies to medications, adverse drug reactions, diagnosis change, or new procedure performed?: [x] No    [] Yes (see summary sheet for update)  Subjective functional status/changes:   [] No changes reported  \"Neck still  Hurts but better. I'm tired today. I went to the doctor and didn;t have wax in my ear so I have to see my other doctor. \"    OBJECTIVE      54 min Therapeutic Exercise:  [x] See flow sheet :   Rationale: increase ROM, increase strength and increase proprioception to improve the patients ability to perform daily activities with decreased pain and symptom levels          With   [] TE   [] TA   [] neuro   [] other: Patient Education: [x] Review HEP    [] Progressed/Changed HEP based on:   [] positioning   [] body mechanics   [] transfers   [] heat/ice application    [] other:      Other Objective/Functional Measures:   1 LOB with ambulation in clinic today due to decreased left foot clearance  Consistent verbal cues to lean forward with sit to stands     Pain Level (0-10 scale) post treatment: 0    ASSESSMENT/Changes in Function: Sit to stands still challenge for pt due to posterior lean. Cues needed to  left foot with ambulation. Educated on importance of completing HEP at home to continue to improve LE strength at home.      Patient will continue to benefit from skilled PT services to modify and progress therapeutic interventions, address functional mobility deficits, address strength deficits, assess and modify postural abnormalities, address imbalance/dizziness and instruct in home and community integration to attain remaining goals. []  See Plan of Care  []  See progress note/recertification  []  See Discharge Summary         Progress towards goals / Updated goals:  Short Term Goals: STG- To be accomplished in 3 week(s):  1.  Pt will be independent with HEP to encourage prophylaxis. Eval:HEP dispensed  Last PN: Poor compliance   Current: continued poor compliance, education on importance today      2. Pt will be able to stand > 15 minutes with rest break to wash dishes and complete ADLs with increased ease. Eval: 5-10 minutes  Last PN: 10 min before rest break  Current: NT       Long Term Goals: LTG- To be accomplished in 6 week(s):  1.  Pt will demonstrate ability to complete 5x sit to stands in >20 seconds without UE support to demonstrate improved LE strength. .  Eval:33.24 seconds with bilateral UE assist   Last PN: 26.67 sec with both UE   Current: Consistent cues to lean forward with sit to stands to increased ease, heavy reliance on UEs      2.  Pt FGA score will improve >/= 4 points to indicate improved functional balance and decrease fall risk  Eval:13/30  Last PN: 13/30   Current: NT      3.  Pt bilateral LE strength will improve to >/= 4+/5 to allow pt to complete transfers with increased ease. Eval: 4/5 bilateral LE  Last PN: 4/5 except 3+/5 hip flexion   Current: NT        4.  New goal 3/27/18: Pt Pryor Balance score will improve by 6 points to demonstrate improved transfer ability and decreased risk for falls  Last PN : 33/56   Current: NT        PLAN  [x]  Upgrade activities as tolerated     [x]  Continue plan of care  []  Update interventions per flow sheet       []  Discharge due to:_  []  Other:_      Marisa Marin 3/29/2018  3:17 PM    Future Appointments  Date Time Provider Niraj Light   4/3/2018 1:00 PM Sally Brunner, PTA [facility]   4/6/2018 3:00 PM Billie Farfan Remesic [facility]   4/10/2018 2:30 PM Zahra Rivers PTA [facility]   4/12/2018 1:00 PM Damien Rangel Remesic [facility]   4/17/2018 1:00 PM Zahra Rivers PTA [facility]   4/19/2018 1:30 PM Damien Rangel Remesic [facility]   4/24/2018 1:00 PM Zahra Rivers PTA [facility]   4/26/2018 1:00 PM Aracely Hollins PT [facility]

## 2018-04-03 ENCOUNTER — HOSPITAL ENCOUNTER (OUTPATIENT)
Dept: PHYSICAL THERAPY | Age: 83
Discharge: HOME | End: 2018-04-03
Payer: MEDICARE

## 2018-04-03 PROCEDURE — 97530 THERAPEUTIC ACTIVITIES: CPT

## 2018-04-03 PROCEDURE — 97110 THERAPEUTIC EXERCISES: CPT

## 2018-04-03 NOTE — PROGRESS NOTES
PT DAILY TREATMENT NOTE - [facility]     Patient Name: Tian Pena  Date:4/3/2018  : 1925  [x]  Patient  Verified  Payor: [place] MEDICARE / Plan: [place] MEDICARE PART A & B / Product Type: Medicare /    In time:1:06  Out time:2:04  Total Treatment Time (min): 58  Total Timed Codes (min): 58  1:1 Treatment Time ( [address] only): 62   Visit #:  of 20    Treatment Area: Bilateral leg weakness [R29.898]  Unstable gait [R26.81]    SUBJECTIVE  Pain Level (0-10 scale): 1/10  Any medication changes, allergies to medications, adverse drug reactions, diagnosis change, or new procedure performed?: [x] No    [] Yes (see summary sheet for update)  Subjective functional status/changes:   [] No changes reported  \"I just have some pain in my neck. \"    OBJECTIVE      30 min Therapeutic Exercise:  [x] See flow sheet :   Rationale: increase ROM, increase strength and improve coordination to improve the patients ability to return to prior level of physical activity. 28 min Therapeutic Activity:  [x]  See flow sheet :   Rationale: increase ROM, increase strength and improve coordination  to improve the patients ability to return to prior level of physical activity. With   [] TE   [] TA   [] neuro   [] other: Patient Education: [x] Review HEP    [] Progressed/Changed HEP based on:   [] positioning   [] body mechanics   [] transfers   [] heat/ice application    [] other:      Other Objective/Functional Measures:      Pain Level (0-10 scale) post treatment: 0/10    ASSESSMENT/Changes in Function: Pt continues to show greatly decreased stability when amb without AD. Pt requires min-a for balance correction. Pt required seated rest breaks throughout tx. Pt denied modalities post tx.      Patient will continue to benefit from skilled PT services to modify and progress therapeutic interventions, address functional mobility deficits, address ROM deficits, address strength deficits, analyze and address soft tissue restrictions, analyze and cue movement patterns, analyze and modify body mechanics/ergonomics and assess and modify postural abnormalities to attain remaining goals. []  See Plan of Care  []  See progress note/recertification  []  See Discharge Summary         Progress towards goals / Updated goals:  Short Term Goals: STG- To be accomplished in 3 week(s):  1.  Pt will be independent with HEP to encourage prophylaxis. Eval:HEP dispensed  Last PN: Poor compliance   Current: continued poor compliance, education on importance today      2. Pt will be able to stand > 15 minutes with rest break to wash dishes and complete ADLs with increased ease. Eval: 5-10 minutes  Last PN: 10 min before rest break  Current: NT       Long Term Goals: LTG- To be accomplished in 6 week(s):  1.  Pt will demonstrate ability to complete 5x sit to stands in >20 seconds without UE support to demonstrate improved LE strength. .  Eval:33.24 seconds with bilateral UE assist   Last PN: 26.67 sec with both UE   Current: Consistent cues to lean forward with sit to stands to increased ease, heavy reliance on UEs      2.  Pt FGA score will improve >/= 4 points to indicate improved functional balance and decrease fall risk  Eval:13/30  Last PN: 13/30   Current: NT      3.  Pt bilateral LE strength will improve to >/= 4+/5 to allow pt to complete transfers with increased ease. Eval: 4/5 bilateral LE  Last PN: 4/5 except 3+/5 hip flexion   Current: NT      4.  New goal 3/27/18: Pt Pryor Balance score will improve by 6 points to demonstrate improved transfer ability and decreased risk for falls  Last PN : 33/56   Current: NT       PLAN  [x]  Upgrade activities as tolerated     [x]  Continue plan of care  []  Update interventions per flow sheet       []  Discharge due to:_  []  Other:_      Teja Cline 4/3/2018  1:11 PM    Future Appointments  Date Time Provider Niraj Light   4/6/2018 3:00 PM Darin COOPER [facility]   4/10/2018 2:30 PM Eliu Morfin PTA [facility]   4/12/2018 1:00 PM Beola [place] Remesic [facility]   4/17/2018 1:00 PM Jannell Dancer, PTA [facility]   4/19/2018 1:30 PM Beola [place] Remesic [facility]   4/24/2018 1:00 PM Berenice Lopez [facility]   4/26/2018 1:00 PM Amanda Shultz, PT [facility]

## 2018-04-06 ENCOUNTER — HOSPITAL ENCOUNTER (OUTPATIENT)
Dept: PHYSICAL THERAPY | Age: 83
Discharge: HOME | End: 2018-04-06
Payer: MEDICARE

## 2018-04-06 PROCEDURE — 97110 THERAPEUTIC EXERCISES: CPT

## 2018-04-06 PROCEDURE — 97530 THERAPEUTIC ACTIVITIES: CPT

## 2018-04-06 NOTE — PROGRESS NOTES
PT DAILY TREATMENT NOTE - [facility]     Patient Name: Tian Pena  Date:2018  : 1925  [x]  Patient  Verified  Payor: [place] MEDICARE / Plan: VA MEDICARE PART A & B / Product Type: Medicare /    In time:3:02  Out time:4:02  Total Treatment Time (min): 60  Total Timed Codes (min): 60  1:1 Treatment Time ( [address] only): 39  Visit #: 12 of 20    Treatment Area: Bilateral leg weakness [R29.898]  Unstable gait [R26.81]    SUBJECTIVE  Pain Level (0-10 scale): 0  Any medication changes, allergies to medications, adverse drug reactions, diagnosis change, or new procedure performed?: [x] No    [] Yes (see summary sheet for update)  Subjective functional status/changes:   [] No changes reported  \"No pain. I'm just full from lunch. \"    OBJECTIVE        40 min Therapeutic Exercise:  [x] See flow sheet :   Rationale: increase ROM, increase strength and improve coordination to improve the patients ability to perform daily activities with decreased pain and symptom levels    20 min Therapeutic Activity:  [x]  See flow sheet :  Sit to stands without UE use, amb in clinic with HHA and CGA, supine to sit transfers   Rationale: increase strength, improve coordination, improve balance and increase proprioception  to improve the patients ability to perform daily activities with decreased pain and symptom levels           With   [] TE   [] TA   [] neuro   [] other: Patient Education: [x] Review HEP    [] Progressed/Changed HEP based on:   [] positioning   [] body mechanics   [] transfers   [] heat/ice application    [] other:      Other Objective/Functional Measures:   5x sit to stand with armrests 26.44 sec   Consistent VC to clear left LE with amb     Pain Level (0-10 scale) post treatment: 0    ASSESSMENT/Changes in Function: Continues to demonstrate decreased clearance of left LE with amb needing cues to correct. No significant change with 5x sit to stand test today with continued heavy reliance on UE.      Patient will continue to benefit from skilled PT services to modify and progress therapeutic interventions, address functional mobility deficits, address strength deficits, analyze and modify body mechanics/ergonomics, assess and modify postural abnormalities, address imbalance/dizziness and instruct in home and community integration to attain remaining goals. []  See Plan of Care  []  See progress note/recertification  []  See Discharge Summary         Progress towards goals / Updated goals:  Short Term Goals: STG- To be accomplished in 3 week(s):  1.  Pt will be independent with HEP to encourage prophylaxis. Eval:HEP dispensed  Last PN: Poor compliance   Current: continued poor compliance, education on importance today      2. Pt will be able to stand > 15 minutes with rest break to wash dishes and complete ADLs with increased ease. Eval: 5-10 minutes  Last PN: 10 min before rest break  Current: NT       Long Term Goals: LTG- To be accomplished in 6 week(s):  1.  Pt will demonstrate ability to complete 5x sit to stands in >20 seconds without UE support to demonstrate improved LE strength. .  Eval:33.24 seconds with bilateral UE assist   Last PN: 26.67 sec with both UE   Current: heavy reliance on UEs still, 26.44 sec today       2.  Pt FGA score will improve >/= 4 points to indicate improved functional balance and decrease fall risk  Eval:13/30  Last PN: 13/30   Current: NT      3.  Pt bilateral LE strength will improve to >/= 4+/5 to allow pt to complete transfers with increased ease. Eval: 4/5 bilateral LE  Last PN: 4/5 except 3+/5 hip flexion   Current: NT      4.  New goal 3/27/18: Pt Pryor Balance score will improve by 6 points to demonstrate improved transfer ability and decreased risk for falls  Last PN : 33/56   Current: NT    PLAN  [x]  Upgrade activities as tolerated     [x]  Continue plan of care  []  Update interventions per flow sheet       []  Discharge due to:_  []  Other:Suzanne Coley Tere 4/6/2018  3:32 PM    Future Appointments  Date Time Provider Niraj Nadege   4/10/2018 2:30 PM Heather Bailon [facility]   4/12/2018 1:00 PM Matt Carranza [facility]   4/17/2018 1:00 PM Anabel Forbes PTA [facility]   4/19/2018 1:30 PM Matt Carranza [facility]   4/24/2018 1:00 PM Marquise Smart [facility]   4/26/2018 1:00 PM Amanda Adorno PT [facility]

## 2018-04-10 ENCOUNTER — HOSPITAL ENCOUNTER (OUTPATIENT)
Dept: PHYSICAL THERAPY | Age: 83
Discharge: HOME | End: 2018-04-10
Payer: MEDICARE

## 2018-04-10 PROCEDURE — 97530 THERAPEUTIC ACTIVITIES: CPT

## 2018-04-10 PROCEDURE — 97110 THERAPEUTIC EXERCISES: CPT

## 2018-04-10 NOTE — PROGRESS NOTES
PT DAILY TREATMENT NOTE - [facility]     Patient Name: Jelly Jackson  Date:4/10/2018  : 1925  [x]  Patient  Verified  Payor: Robin Medrano / Plan: [place] MEDICARE PART A & B / Product Type: Medicare /    In time:1435  Out time:1328  Total Treatment Time (min): 53  Total Timed Codes (min): 53  1:1 Treatment Time ( [address] only): 48   Visit #: 13 of 20    Treatment Area: Bilateral leg weakness [R29.898]  Unstable gait [R26.81]    SUBJECTIVE  Pain Level (0-10 scale): 3/10 neck  Any medication changes, allergies to medications, adverse drug reactions, diagnosis change, or new procedure performed?: [x] No    [] Yes (see summary sheet for update)  Subjective functional status/changes:   [] No changes reported  I want to go back to [place] in my [place] cottage. My daughters are coming to visit soon.     OBJECTIVE    Modality rationale:    Min Type Additional Details    [] Estim:  []Unatt       []IFC  []Premod                        []Other:  []w/ice   []w/heat  Position:  Location:    [] Estim: []Att    []TENS instruct  []NMES                    []Other:  []w/US   []w/ice   []w/heat  Position:  Location:    []  Traction: [] Cervical       []Lumbar                       [] Prone          []Supine                       []Intermittent   []Continuous Lbs:  [] before manual  [] after manual    []  Ultrasound: []Continuous   [] Pulsed                           []1MHz   []3MHz W/cm2:  Location:    []  Iontophoresis with dexamethasone         Location: [] Take home patch   [] In clinic    []  Ice     []  heat  []  Ice massage  []  Laser   []  Anodyne Position:  Location:    []  Laser with stim  []  Other:  Position:  Location:    []  Vasopneumatic Device Pressure:       [] lo [] med [] hi   Temperature: [] lo [] med [] hi   [] Skin assessment post-treatment:  []intact []redness- no adverse reaction    []redness  adverse reaction:      min []Eval                  []Re-Eval       40 min Therapeutic Exercise:  [x] See flow sheet : Rationale: increase ROM, increase strength and improve coordination to improve the patients ability to perform daily activities with decreased pain and symptom levels    13 min Therapeutic Activity:  [x]  See flow sheet :added PPT to decrease LBP in bed; reviewed bed mobility   Rationale: increase ROM, increase strength, improve coordination, improve balance and increase proprioception  to improve the patients ability to perform daily activities with decreased pain and symptom levels               With   [] TE   [] TA   [] neuro   [] other: Patient Education: [x] Review HEP    [] Progressed/Changed HEP based on:   [] positioning   [] body mechanics   [] transfers   [] heat/ice application    [] other:      Other Objective/Functional Measures:      Pain Level (0-10 scale) post treatment: 0/10    ASSESSMENT/Changes in Function: No new changes with bed mobility reviewed today. Patient will continue to benefit from skilled PT services to modify and progress therapeutic interventions, address ROM deficits, address strength deficits, analyze and address soft tissue restrictions, analyze and cue movement patterns, analyze and modify body mechanics/ergonomics, assess and modify postural abnormalities, address imbalance/dizziness and instruct in home and community integration to attain remaining goals. []  See Plan of Care  [x]  See progress note/recertification  []  See Discharge Summary         Progress towards goals / Updated goals:  Short Term Goals: STG- To be accomplished in 3 week(s):  1.  Pt will be independent with HEP to encourage prophylaxis. Eval:HEP dispensed  Last PN: Poor compliance   Current: continued poor compliance, education on importance today      2. Pt will be able to stand > 15 minutes with rest break to wash dishes and complete ADLs with increased ease.   Eval: 5-10 minutes  Last PN: 10 min before rest break  Current: no change       Long Term Goals: LTG- To be accomplished in 6 week(s):  1.  Pt will demonstrate ability to complete 5x sit to stands in >20 seconds without UE support to demonstrate improved LE strength. .  Eval:33.24 seconds with bilateral UE assist   Last PN: 26.67 sec with both UE   Current: heavy reliance on UEs still, 26.44 sec today       2.  Pt FGA score will improve >/= 4 points to indicate improved functional balance and decrease fall risk  Eval:13/30  Last PN: 13/30   Current: NT      3.  Pt bilateral LE strength will improve to >/= 4+/5 to allow pt to complete transfers with increased ease. Eval: 4/5 bilateral LE  Last PN: 4/5 except 3+/5 hip flexion   Current: NT      4.  New goal 3/27/18: Pt Pryor Balance score will improve by 6 points to demonstrate improved transfer ability and decreased risk for falls  Last PN : 33/56   Current: NT       PLAN  [x]  Upgrade activities as tolerated     [x]  Continue plan of care  []  Update interventions per flow sheet       []  Discharge due to:_  []  Other:_      Wallace Richardson PTA 4/10/2018  3:13 PM    Future Appointments  Date Time Provider Niraj Light   4/12/2018 1:00 PM Hitesh Crespo [facility]   4/17/2018 1:00 PM Wallace Richardson PTA [facility]   4/19/2018 1:30 PM Carlos Carranza [facility]   4/24/2018 1:00 PM Sameer Grier [facility]   4/26/2018 1:00 PM Amanda Hickey PT [facility]

## 2018-04-12 ENCOUNTER — HOSPITAL ENCOUNTER (OUTPATIENT)
Dept: PHYSICAL THERAPY | Age: 83
Discharge: HOME | End: 2018-04-12
Payer: MEDICARE

## 2018-04-12 PROCEDURE — 97530 THERAPEUTIC ACTIVITIES: CPT

## 2018-04-12 PROCEDURE — 97110 THERAPEUTIC EXERCISES: CPT

## 2018-04-12 PROCEDURE — 97140 MANUAL THERAPY 1/> REGIONS: CPT

## 2018-04-12 NOTE — PROGRESS NOTES
PT DAILY TREATMENT NOTE - [facility]     Patient Name: Reny Ayala  Date:2018  : 1925  [x]  Patient  Verified  Payor: [place] / Plan: [place] MEDICARE PART A & B / Product Type: Medicare /    In time:1:02  Out time:2:05  Total Treatment Time (min): 63  Total Timed Codes (min): 63  1:1 Treatment Time ([facility] only): 45   Visit #: 14 of 20    Treatment Area: Bilateral leg weakness [R29.898]  Unstable gait [R26.81]    SUBJECTIVE  Pain Level (0-10 scale): 2  Any medication changes, allergies to medications, adverse drug reactions, diagnosis change, or new procedure performed?: [x] No    [] Yes (see summary sheet for update)  Subjective functional status/changes:   [] No changes reported  \"My neck really hurts. \"    OBJECTIVE      43 min Therapeutic Exercise:  [x] See flow sheet :   Rationale: increase ROM, increase strength and increase proprioception to improve the patients ability to perform daily activities with decreased pain and symptom levels    10 min Therapeutic Activity:  [x]  See flow sheet : amb in clinic, marches, log roll technique   Rationale: increase ROM, increase strength, improve coordination, improve balance and increase proprioception  to improve the patients ability to perform daily activities with decreased pain and symptom levels    10 min Manual Therapy:  Gentle functional massage to cervical paraspinals and UT   Rationale: decrease pain, increase ROM and increase tissue extensibility to perform daily activities with decreased pain and symptom levels          With   [] TE   [] TA   [] neuro   [] other: Patient Education: [x] Review HEP    [] Progressed/Changed HEP based on:   [] positioning   [] body mechanics   [] transfers   [] heat/ice application    [] other:      Other Objective/Functional Measures:   Continued decreased right step length and left hip flexoin    Pain Level (0-10 scale) post treatment: 0    ASSESSMENT/Changes in Function: Decreased neck pain post manual. Continued left LE weakness with difficulty picking up left LE with ambulation. Consistent cues to use log roll technique for bed mobility. Patient will continue to benefit from skilled PT services to modify and progress therapeutic interventions, address functional mobility deficits, address strength deficits, assess and modify postural abnormalities, address imbalance/dizziness and instruct in home and community integration to attain remaining goals. []  See Plan of Care  []  See progress note/recertification  []  See Discharge Summary         Progress towards goals / Updated goals:  Short Term Goals: STG- To be accomplished in 3 week(s):  1.  Pt will be independent with HEP to encourage prophylaxis. Eval:HEP dispensed  Last PN: Poor compliance   Current: continued poor compliance, education on importance today      2. Pt will be able to stand > 15 minutes with rest break to wash dishes and complete ADLs with increased ease. Eval: 5-10 minutes  Last PN: 10 min before rest break  Current: no change       Long Term Goals: LTG- To be accomplished in 6 week(s):  1.  Pt will demonstrate ability to complete 5x sit to stands in >20 seconds without UE support to demonstrate improved LE strength. .  Eval:33.24 seconds with bilateral UE assist   Last PN: 26.67 sec with both UE   Current: heavy reliance on UEs still, 26.44 sec today       2.  Pt FGA score will improve >/= 4 points to indicate improved functional balance and decrease fall risk  Eval:13/30  Last PN: 13/30   Current: NT      3.  Pt bilateral LE strength will improve to >/= 4+/5 to allow pt to complete transfers with increased ease. Eval: 4/5 bilateral LE  Last PN: 4/5 except 3+/5 hip flexion   Current: decreased left LE clearance with amb       4.  New goal 3/27/18: Pt Pryor Balance score will improve by 6 points to demonstrate improved transfer ability and decreased risk for falls  Last PN : 33/56   Current: NT    PLAN  [x]  Upgrade activities as tolerated     [x]  Continue plan of care  []  Update interventions per flow sheet       []  Discharge due to:_  []  Other:_      Jocelyne Carranza 4/12/2018  1:10 PM    Future Appointments  Date Time Provider Niraj Light   4/17/2018 1:00 PM Xavier Henao PTA [facility]   4/19/2018 1:30 PM Jocelyne Carranza [facility]   4/24/2018 1:00 PM Jose Waldrop [facility]   4/26/2018 1:00 PM Amanda Doyle PT [facility]

## 2018-04-17 ENCOUNTER — HOSPITAL ENCOUNTER (OUTPATIENT)
Dept: PHYSICAL THERAPY | Age: 83
Discharge: HOME | End: 2018-04-17
Payer: MEDICARE

## 2018-04-17 PROCEDURE — 97112 NEUROMUSCULAR REEDUCATION: CPT

## 2018-04-17 NOTE — PROGRESS NOTES
PT DAILY TREATMENT NOTE - [facility]     Patient Name: Eleni Car  Date:2018  : 1925  [x]  Patient  Verified  Payor: VA MEDICARE / Plan: VA MEDICARE PART A & B / Product Type: Medicare /    In time:1232  Out time:130  Total Treatment Time (min): 58  Total Timed Codes (min): 48  1:1 Treatment Time ( [address] only): 30   Visit #: 15 of 20    Treatment Area: Bilateral leg weakness [R29.898]  Unstable gait [R26.81]    SUBJECTIVE  Pain Level (0-10 scale): 3  Any medication changes, allergies to medications, adverse drug reactions, diagnosis change, or new procedure performed?: [x] No    [] Yes (see summary sheet for update)  Subjective functional status/changes:   [] No changes reported  \"My neck is tight. \"    OBJECTIVE      18 min Therapeutic Exercise:  [x] See flow sheet :   Rationale: increase ROM, increase strength and increase proprioception to improve the patients ability to perform daily activities with decreased pain and symptom levels    30 min Therapeutic Activity:  [x]  See flow sheet : amb in clinic, static and dynamic balance activities, square step, dancing step with min guidance   Rationale: increase ROM, increase strength, improve coordination, improve balance and increase proprioception  to improve the patients ability to perform daily activities with decreased pain and symptom levels    10 min MH to CS post ex          With   [] TE   [] TA   [] neuro   [] other: Patient Education: [x] Review HEP    [] Progressed/Changed HEP based on:   [] positioning   [] body mechanics   [] transfers   [] heat/ice application    [] other:      Other Objective/Functional Measures:   Overall small hesitant steps  Improved stepping with dancing step    Pain Level (0-10 scale) post treatment: 0    ASSESSMENT/Changes in Function: Decreased neck pain post manual. Continued left LE weakness with difficulty picking up left LE with ambulation. Consistent cues to use log roll technique for bed mobility. Patient will continue to benefit from skilled PT services to modify and progress therapeutic interventions, address functional mobility deficits, address strength deficits, assess and modify postural abnormalities, address imbalance/dizziness and instruct in home and community integration to attain remaining goals. []  See Plan of Care  []  See progress note/recertification  []  See Discharge Summary         Progress towards goals / Updated goals:  Short Term Goals: STG- To be accomplished in 3 week(s):  1.  Pt will be independent with HEP to encourage prophylaxis. Eval:HEP dispensed  Last PN: Poor compliance   Current: continued poor compliance, education on importance today      2. Pt will be able to stand > 15 minutes with rest break to wash dishes and complete ADLs with increased ease. Eval: 5-10 minutes  Last PN: 10 min before rest break  Current: no change       Long Term Goals: LTG- To be accomplished in 6 week(s):  1.  Pt will demonstrate ability to complete 5x sit to stands in >20 seconds without UE support to demonstrate improved LE strength. .  Eval:33.24 seconds with bilateral UE assist   Last PN: 26.67 sec with both UE   Current: heavy reliance on UEs still, 26.44 sec today       2.  Pt FGA score will improve >/= 4 points to indicate improved functional balance and decrease fall risk  Eval:13/30  Last PN: 13/30   Current: NT      3.  Pt bilateral LE strength will improve to >/= 4+/5 to allow pt to complete transfers with increased ease. Eval: 4/5 bilateral LE  Last PN: 4/5 except 3+/5 hip flexion   Current: decreased left LE clearance with amb       4.  New goal 3/27/18: Pt Pryor Balance score will improve by 6 points to demonstrate improved transfer ability and decreased risk for falls  Last PN : 33/56   Current: NT    PLAN  [x]  Upgrade activities as tolerated     [x]  Continue plan of care  []  Update interventions per flow sheet       []  Discharge due to:_  []  Other:_      Jane Torres PT 4/17/2018  1:10 PM    Future Appointments  Date Time Provider Niraj Light   4/19/2018 1:30 PM [facility]   4/24/2018 1:00 PM Doug [facility]   4/26/2018 1:00 PM Amanda guzman, DARYL RESENDIZHPBAM [facility]

## 2018-04-19 ENCOUNTER — HOSPITAL ENCOUNTER (OUTPATIENT)
Dept: PHYSICAL THERAPY | Age: 83
Discharge: HOME | End: 2018-04-19
Payer: MEDICARE

## 2018-04-19 PROCEDURE — 97530 THERAPEUTIC ACTIVITIES: CPT

## 2018-04-19 NOTE — PROGRESS NOTES
PT DAILY TREATMENT NOTE - [facility]     Patient Name: Fredi Granda  Date:2018  : 1925  [x]  Patient  Verified  Payor: [place] MEDICARE / Plan: [place] MEDICARE PART A & B / Product Type: Medicare /    In time:1:30  Out time:2:30  Total Treatment Time (min): 60  Total Timed Codes (min): 60  1:1 Treatment Time ( [address] only): 35   Visit #: 16 of 20    Treatment Area: Bilateral leg weakness [R29.898]  Unstable gait [R26.81]    SUBJECTIVE  Pain Level (0-10 scale): 1  Any medication changes, allergies to medications, adverse drug reactions, diagnosis change, or new procedure performed?: [x] No    [] Yes (see summary sheet for update)  Subjective functional status/changes:   [] No changes reported  \"Feeling okay. We danced last time. \"    OBJECTIVE      30 min Therapeutic Exercise:  [x] See flow sheet :   Rationale: increase ROM, increase strength and improve coordination to improve the patients ability to perform daily activities with decreased pain and symptom levels    30 min Therapeutic Activity:  [x]  See flow sheet : Ambulation in clinic with HHA 2 laps, amb forward and backwards with light UE support by therapist, [place] Balance test    Rationale: increase strength, improve coordination, improve balance and increase proprioception  to improve the patients ability to perform daily activities with decreased pain and symptom levels           With   [] TE   [] TA   [] neuro   [] other: Patient Education: [x] Review HEP    [] Progressed/Changed HEP based on:   [] positioning   [] body mechanics   [] transfers   [] heat/ice application    [] other:      Other Objective/Functional Measures:   24sec 5x sit to stand with heavy reliance on UE  35/56 Pryor Balance score      Pain Level (0-10 scale) post treatment: 0    ASSESSMENT/Changes in Function: Pt continues to drag left foot with ambulation due to weakness. Improved 5x sit to stand today however continued reliance with UE.  Pryor balance improved as well with SL, tandem and transfers most difficult. Patient will continue to benefit from skilled PT services to modify and progress therapeutic interventions, address functional mobility deficits, address strength deficits, analyze and modify body mechanics/ergonomics, assess and modify postural abnormalities, address imbalance/dizziness and instruct in home and community integration to attain remaining goals. []  See Plan of Care  []  See progress note/recertification  []  See Discharge Summary         Progress towards goals / Updated goals:  Short Term Goals: STG- To be accomplished in 3 week(s):  1.  Pt will be independent with HEP to encourage prophylaxis. Eval:HEP dispensed  Last PN: Poor compliance   Current: continued poor compliance, education on importance today      2. Pt will be able to stand > 15 minutes with rest break to wash dishes and complete ADLs with increased ease. Eval: 5-10 minutes  Last PN: 10 min before rest break  Current: no change       Long Term Goals: LTG- To be accomplished in 6 week(s):  1.  Pt will demonstrate ability to complete 5x sit to stands in >20 seconds without UE support to demonstrate improved LE strength. .  Eval:33.24 seconds with bilateral UE assist   Last PN: 26.67 sec with both UE   Current: heavy reliance on UEs still, 24 seconds today - progressing       2.  Pt FGA score will improve >/= 4 points to indicate improved functional balance and decrease fall risk  Eval:13/30  Last PN: 13/30   Current: NT      3.  Pt bilateral LE strength will improve to >/= 4+/5 to allow pt to complete transfers with increased ease. Eval: 4/5 bilateral LE  Last PN: 4/5 except 3+/5 hip flexion   Current: decreased left LE clearance with amb       4.  New goal 3/27/18: Pt Pryor Balance score will improve by 6 points to demonstrate improved transfer ability and decreased risk for falls  Last PN : 33/56   Current: 35/56 - progressing        PLAN  [x]  Upgrade activities as tolerated     [x] Continue plan of care  []  Update interventions per flow sheet       []  Discharge due to:_  []  Other:_      Deniz Carranza 4/19/2018  1:39 PM    Future Appointments  Date Time Provider Niraj Light   4/24/2018 1:00 PM Cherie COOPER [facility]   4/26/2018 1:00 PM DARYL Stein [facility]

## 2018-04-24 ENCOUNTER — APPOINTMENT (OUTPATIENT)
Dept: PHYSICAL THERAPY | Age: 83
End: 2018-04-24
Payer: MEDICARE

## 2018-04-26 ENCOUNTER — HOSPITAL ENCOUNTER (OUTPATIENT)
Dept: PHYSICAL THERAPY | Age: 83
Discharge: HOME | End: 2018-04-26
Payer: MEDICARE

## 2018-04-26 PROCEDURE — G8978 MOBILITY CURRENT STATUS: HCPCS

## 2018-04-26 PROCEDURE — 97530 THERAPEUTIC ACTIVITIES: CPT

## 2018-04-26 PROCEDURE — G8979 MOBILITY GOAL STATUS: HCPCS

## 2018-04-26 PROCEDURE — 97112 NEUROMUSCULAR REEDUCATION: CPT

## 2018-04-26 NOTE — PROGRESS NOTES
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  Pt will be independent with HEP to encourage prophylaxis. Eval:HEP dispensed  Last PN: Poor compliance   Current: continued poor compliance, education on importance today      2. Pt will be able to stand > 15 minutes with rest break to wash dishes and complete ADLs with increased ease. Eval: 5-10 minutes  Last PN: 10 min before rest break  Current: no change       Long Term Goals: LTG- To be accomplished in 6 week(s):  1.  Pt will demonstrate ability to complete 5x sit to stands in >20 seconds without UE support to demonstrate improved LE strength. .  Eval:33.24 seconds with bilateral UE assist   Last PN: 26.67 sec with both UE   Current: heavy reliance on UEs still, 24 seconds today - progressing       2.  Pt FGA score will improve >/= 4 points to indicate improved functional balance and decrease fall risk  Eval:  Last PN:    Current: NT      3.  Pt bilateral LE strength will improve to >/= 4+/5 to allow pt to complete transfers with increased ease.   Eval: 4/5 bilateral LE  Last PN: 4/5 except 3+/5 hip flexion   Current: performing sit to stand transfer with min use of hands,strength progressing slowly, progressing      4. New goal 3/27/18: Pt Pryor Balance score will improve by 6 points to demonstrate improved transfer ability and decreased risk for falls  Last PN : 33/56   Current: 35/56 - progressing     Key functional changes:,minimal  improvement in ambulatory balance      Problems/ barriers to goal attainment: strength, endurance , habitual posture     Problem List: decrease ROM, decrease strength and impaired gait/ balance    Treatment Plan: Therapeutic exercise, Therapeutic activities, Neuromuscular re-education, Gait/balance training and Patient education         Frequency / Duration: Patient to be seen 2 times per week for 4 weeks:    Assessment / Recommendations:Mrs. Bard Dunn has been showing slow improvement in ambulatory balance, endurance , strength and mobility. Her Pryor Balance score has improved to 35/56 indicating medium fall risk. I recommend continuation of current treatment  To address remaining goals. G-Codes (GP)  Mobility  [id number] Current  CJ= 20-39%  [id number] Goal  CK= 40-59%    The severity rating is based on clinical judgment and the FOTO score. Certification Period: 4/26/18-5/25/18    Pepe Hannah, PT 4/26/2018 1:40 PM    ________________________________________________________________________  I certify that the above Therapy Services are being furnished while the patient is under my care. I agree with the treatment plan and certify that this therapy is necessary. [] I have read the above and request that my patient continue as recommended.   [] I have read the above report and request that my patient continue therapy with the following changes/special instructions: ______________________________________  [] I have read the above report and request that my patient be discharged from therapy    Physician's Signature:_______________________________Date:___________Time:__________    Please sign and return to   [facility] in [address]  [place] [address]  Phone: [phone]      Fax:  [phone]

## 2018-04-26 NOTE — PROGRESS NOTES
PT DAILY TREATMENT NOTE - [facility]     Patient Name: Andrew George  Date:2018  : 1925  [x]  Patient  Verified  Payor: [place] MEDICARE / Plan: VA MEDICARE PART A & B / Product Type: Medicare /    In time:1  Out time:150  Total Treatment Time (min): 50  Total Timed Codes (min): 50  1:1 Treatment Time ( [address] only): 30   Visit #: 17 of 17 + 8    Treatment Area: Bilateral leg weakness [R29.898]  Unstable gait [R26.81]    SUBJECTIVE  Pain Level (0-10 scale): 0  Any medication changes, allergies to medications, adverse drug reactions, diagnosis change, or new procedure performed?: [x] No    [] Yes (see summary sheet for update)  Subjective functional status/changes:   [] No changes reported  Doing fine\"    OBJECTIVE      20 min Therapeutic Exercise:  [x] See flow sheet :   Rationale: increase ROM, increase strength and improve coordination to improve the patients ability to perform daily activities with decreased pain and symptom levels    30 min Therapeutic Activity:  [x]  See flow sheet : Ambulation in clinic with HHA 3 laps, amb forward and backwards with light UE support by therapist, transfer training/balance activities   Rationale: increase strength, improve coordination, improve balance and increase proprioception  to improve the patients ability to perform daily activities with decreased pain and symptom levels           With   [] TE   [] TA   [] neuro   [] other: Patient Education: [x] Review HEP    [] Progressed/Changed HEP based on:   [] positioning   [] body mechanics   [] transfers   [] heat/ice application    [] other:      Other Objective/Functional Measures:   Needs CGA with most activities,     Pain Level (0-10 scale) post treatment: 0    ASSESSMENT/Changes in Function: Pt continues to drag left foot with ambulation due to weakness. Improved 5x sit to stand today however continued reliance with UE. Pryor balance improved as well with SL, tandem and transfers most difficult.      Patient will continue to benefit from skilled PT services to modify and progress therapeutic interventions, address functional mobility deficits, address strength deficits, analyze and modify body mechanics/ergonomics, assess and modify postural abnormalities, address imbalance/dizziness and instruct in home and community integration to attain remaining goals. [x]  See Plan of Care  [x]  See progress note/recertification  []  See Discharge Summary         Progress towards goals / Updated goals:  Short Term Goals: STG- To be accomplished in 3 week(s):  1.  Pt will be independent with HEP to encourage prophylaxis. Eval:HEP dispensed  Last PN: Poor compliance   Current: continued poor compliance, education on importance today      2. Pt will be able to stand > 15 minutes with rest break to wash dishes and complete ADLs with increased ease. Eval: 5-10 minutes  Last PN: 10 min before rest break  Current: no change       Long Term Goals: LTG- To be accomplished in 6 week(s):  1.  Pt will demonstrate ability to complete 5x sit to stands in >20 seconds without UE support to demonstrate improved LE strength. .  Eval:33.24 seconds with bilateral UE assist   Last PN: 26.67 sec with both UE   Current: heavy reliance on UEs still, 24 seconds today - progressing       2.  Pt FGA score will improve >/= 4 points to indicate improved functional balance and decrease fall risk  Eval:13/30  Last PN: 13/30   Current: NT      3.  Pt bilateral LE strength will improve to >/= 4+/5 to allow pt to complete transfers with increased ease. Eval: 4/5 bilateral LE  Last PN: 4/5 except 3+/5 hip flexion   Current: performing sit to stand transfer with min use of hands,strength progressing slowly, progressing      4.  New goal 3/27/18: Pt Pryor Balance score will improve by 6 points to demonstrate improved transfer ability and decreased risk for falls  Last PN : 33/56   Current: 35/56 - progressing        PLAN  [x]  Upgrade activities as tolerated     [x] Continue plan of care  []  Update interventions per flow sheet       []  Discharge due to:_  []  Other:_      Amanda Greer, PT 4/26/2018  1:39 PM    No future appointments.

## 2018-05-01 ENCOUNTER — HOSPITAL ENCOUNTER (OUTPATIENT)
Dept: PHYSICAL THERAPY | Age: 83
Discharge: HOME | End: 2018-05-01
Payer: MEDICARE

## 2018-05-01 PROCEDURE — 97530 THERAPEUTIC ACTIVITIES: CPT

## 2018-05-01 PROCEDURE — 97110 THERAPEUTIC EXERCISES: CPT

## 2018-05-01 NOTE — PROGRESS NOTES
PT DAILY TREATMENT NOTE - [facility]     Patient Name: Jhonatan Blank  Date:2018  : 1925  [x]  Patient  Verified  Payor: VA MEDICARE / Plan: VA MEDICARE PART A & B / Product Type: Medicare /    In time:1503  Out time:1550  Total Treatment Time (min): 47  Total Timed Codes (min): 47  1:1 Treatment Time ( only): 52   Visit #: 18 of 25    Treatment Area: Bilateral leg weakness [R29.898]  Unstable gait [R26.81]    SUBJECTIVE  Pain Level (0-10 scale): 0/10  Any medication changes, allergies to medications, adverse drug reactions, diagnosis change, or new procedure performed?: [x] No    [] Yes (see summary sheet for update)  Subjective functional status/changes:   [x] No changes reported      OBJECTIVE    Modality rationale:    Min Type Additional Details    [] Estim:  []Unatt       []IFC  []Premod                        []Other:  []w/ice   []w/heat  Position:  Location:    [] Estim: []Att    []TENS instruct  []NMES                    []Other:  []w/US   []w/ice   []w/heat  Position:  Location:    []  Traction: [] Cervical       []Lumbar                       [] Prone          []Supine                       []Intermittent   []Continuous Lbs:  [] before manual  [] after manual    []  Ultrasound: []Continuous   [] Pulsed                           []1MHz   []3MHz W/cm2:  Location:    []  Iontophoresis with dexamethasone         Location: [] Take home patch   [] In clinic    []  Ice     []  heat  []  Ice massage  []  Laser   []  Anodyne Position:  Location:    []  Laser with stim  []  Other:  Position:  Location:    []  Vasopneumatic Device Pressure:       [] lo [] med [] hi   Temperature: [] lo [] med [] hi   [] Skin assessment post-treatment:  []intact []redness- no adverse reaction    []redness  adverse reaction:      min []Eval                  []Re-Eval       19 min Therapeutic Exercise:  [] See flow sheet :   Rationale: increase ROM, increase strength and improve coordination to improve the patients ability to perform daily activities with decreased pain and symptom levels       28 min Therapeutic Activity:  []  See flow sheet :   Rationale: increase strength, improve coordination, improve balance and increase proprioception  to improve the patients ability to perform daily activities with decreased pain and symptom levels                   With   [] TE   [] TA   [] neuro   [] other: Patient Education: [x] Review HEP    [] Progressed/Changed HEP based on:   [] positioning   [] body mechanics   [] transfers   [] heat/ice application    [] other:      Other Objective/Functional Measures:      Pain Level (0-10 scale) post treatment: 0/10    ASSESSMENT/Changes in Function: Pt ambulates using rollator with kyphotic posture and benefits from v/c's to improve postural awareness. Patient will continue to benefit from skilled PT services to modify and progress therapeutic interventions, address ROM deficits, address strength deficits, analyze and address soft tissue restrictions, analyze and cue movement patterns, analyze and modify body mechanics/ergonomics, assess and modify postural abnormalities, address imbalance/dizziness and instruct in home and community integration to attain remaining goals. []  See Plan of Care  [x]  See progress note/recertification  []  See Discharge Summary         Progress towards goals / Updated goals:  Short Term Goals: STG- To be accomplished in 3 week(s):  1.  Pt will be independent with HEP to encourage prophylaxis. Eval:HEP dispensed  Last PN: Poor compliance   Current: continued poor compliance, education on importance reinforced with 3 exercises /day recommended      2. Pt will be able to stand > 15 minutes with rest break to wash dishes and complete ADLs with increased ease.   Eval: 5-10 minutes  Last PN: 10 min before rest break  Current: no change       Long Term Goals: LTG- To be accomplished in 6 week(s):  1.  Pt will demonstrate ability to complete 5x sit to stands in >20 seconds without UE support to demonstrate improved LE strength. .  Eval:33.24 seconds with bilateral UE assist   Last PN:  heavy reliance on UEs still, 24 seconds today - progressing   Current:improved today with cueing to reinforce proper mechanics  2.  Pt FGA score will improve >/= 4 points to indicate improved functional balance and decrease fall risk  Eval:13/30  Last PN: 13/30   Current: no change      3.  Pt bilateral LE strength will improve to >/= 4+/5 to allow pt to complete transfers with increased ease. Eval: 4/5 bilateral LE  Last PN: 4/5 except 3+/5 hip flexion; performing sit to stand transfer with min use of hands,strength progressing slowly, progressing  Current: progressing    4.  New goal 3/27/18: Pt Pryor Balance score will improve by 6 points to demonstrate improved transfer ability and decreased risk for falls  Last PN :35/56 - progressing    Current:        PLAN  [x]  Upgrade activities as tolerated     [x]  Continue plan of care  []  Update interventions per flow sheet       []  Discharge due to:_  []  Other:_      Benitez Rousseau PTA 5/1/2018  2:56 PM    Future Appointments  Date Time Provider Niraj Light   5/3/2018 1:30 PM Melonie COOPER [facility]   5/7/2018 1:00 PM Melonie COOPER [facility]

## 2018-05-03 ENCOUNTER — HOSPITAL ENCOUNTER (OUTPATIENT)
Dept: PHYSICAL THERAPY | Age: 83
Discharge: HOME | End: 2018-05-03
Payer: MEDICARE

## 2018-05-03 PROCEDURE — 97112 NEUROMUSCULAR REEDUCATION: CPT

## 2018-05-03 NOTE — PROGRESS NOTES
PT DAILY TREATMENT NOTE - [facility]     Patient Name: Denis Schumacher  Date:5/3/2018  : 1925  [x]  Patient  Verified  Payor: VA MEDICARE / Plan: VA MEDICARE PART A & B / Product Type: Medicare /    In time:1503  Out time:1550  Total Treatment Time (min): 47  Total Timed Codes (min): 47  1:1 Treatment Time ( only): 52   Visit #: 18 of 25    Treatment Area: Bilateral leg weakness [R29.898]  Unstable gait [R26.81]    SUBJECTIVE  Pain Level (0-10 scale): 0/10  Any medication changes, allergies to medications, adverse drug reactions, diagnosis change, or new procedure performed?: [x] No    [] Yes (see summary sheet for update)  Subjective functional status/changes:   [x] No changes reported      OBJECTIVE    Modality rationale:    Min Type Additional Details    [] Estim:  []Unatt       []IFC  []Premod                        []Other:  []w/ice   []w/heat  Position:  Location:    [] Estim: []Att    []TENS instruct  []NMES                    []Other:  []w/US   []w/ice   []w/heat  Position:  Location:    []  Traction: [] Cervical       []Lumbar                       [] Prone          []Supine                       []Intermittent   []Continuous Lbs:  [] before manual  [] after manual    []  Ultrasound: []Continuous   [] Pulsed                           []1MHz   []3MHz W/cm2:  Location:    []  Iontophoresis with dexamethasone         Location: [] Take home patch   [] In clinic    []  Ice     []  heat  []  Ice massage  []  Laser   []  Anodyne Position:  Location:    []  Laser with stim  []  Other:  Position:  Location:    []  Vasopneumatic Device Pressure:       [] lo [] med [] hi   Temperature: [] lo [] med [] hi   [] Skin assessment post-treatment:  []intact []redness- no adverse reaction    []redness  adverse reaction:      min []Eval                  []Re-Eval       27 min Therapeutic Exercise:  [x] See flow sheet :   Rationale: increase ROM, increase strength and improve coordination to improve the patients ability to perform daily activities with decreased pain and symptom levels       35 min Neuromuscular Reeducation:  [x]  See flow sheet :facilitation of balance reaction, fwd weight shift   Rationale: increase strength, improve coordination, improve balance and increase proprioception  to improve the patients ability to perform daily activities with decreased pain and symptom levels                   With   [] TE   [] TA   [] neuro   [] other: Patient Education: [x] Review HEP    [] Progressed/Changed HEP based on:   [] positioning   [] body mechanics   [] transfers   [] heat/ice application    [] other:      Other Objective/Functional Measures:   FOTO 45%   reports 15% improvement     Pain Level (0-10 scale) post treatment: 0/10    ASSESSMENT/Changes in Function: Pt ambulates using rollator with kyphotic posture and benefits from v/c's to improve postural awareness. Patient will continue to benefit from skilled PT services to modify and progress therapeutic interventions, address ROM deficits, address strength deficits, analyze and address soft tissue restrictions, analyze and cue movement patterns, analyze and modify body mechanics/ergonomics, assess and modify postural abnormalities, address imbalance/dizziness and instruct in home and community integration to attain remaining goals. []  See Plan of Care  [x]  See progress note/recertification  []  See Discharge Summary         Progress towards goals / Updated goals:  Short Term Goals: STG- To be accomplished in 3 week(s):  1.  Pt will be independent with HEP to encourage prophylaxis. Eval:HEP dispensed  Last PN: Poor compliance   Current: continued poor compliance, education on importance reinforced with 3 exercises /day recommended      2. Pt will be able to stand > 15 minutes with rest break to wash dishes and complete ADLs with increased ease.   Eval: 5-10 minutes  Last PN: 10 min before rest break  Current: no change       Long Term Goals: LTG- To be accomplished in 6 week(s):  1.  Pt will demonstrate ability to complete 5x sit to stands in >20 seconds without UE support to demonstrate improved LE strength. .  Eval:33.24 seconds with bilateral UE assist   Last PN:  heavy reliance on UEs still, 24 seconds today - progressing   Current:improved today with cueing to reinforce proper mechanics  2.  Pt FGA score will improve >/= 4 points to indicate improved functional balance and decrease fall risk  Eval:13/30  Last PN: 13/30   Current: no change      3.  Pt bilateral LE strength will improve to >/= 4+/5 to allow pt to complete transfers with increased ease. Eval: 4/5 bilateral LE  Last PN: 4/5 except 3+/5 hip flexion; performing sit to stand transfer with min use of hands,strength progressing slowly, progressing  Current: progressing    4.  New goal 3/27/18: Pt Pryor Balance score will improve by 6 points to demonstrate improved transfer ability and decreased risk for falls  Last PN :35/56 - progressing    Current: NT       PLAN  [x]  Upgrade activities as tolerated     [x]  Continue plan of care  []  Update interventions per flow sheet       []  Discharge due to:_  []  Other:_      Constantin Barrios, PT 5/3/2018  2:56 PM    Future Appointments  Date Time Provider Niraj Light   5/7/2018 1:00 PM Noé RESENDIZHPBAM [facility]   5/10/2018 3:30 PM Amanda Murphy, PT [facility]   5/15/2018 1:30 PM Billie Olivares PTA [facility]   5/17/2018 1:30 PM Reyes Carranza [facility]

## 2018-05-07 ENCOUNTER — HOSPITAL ENCOUNTER (OUTPATIENT)
Dept: PHYSICAL THERAPY | Age: 83
Discharge: HOME | End: 2018-05-07
Payer: MEDICARE

## 2018-05-07 PROCEDURE — 97110 THERAPEUTIC EXERCISES: CPT

## 2018-05-07 PROCEDURE — 97530 THERAPEUTIC ACTIVITIES: CPT

## 2018-05-07 NOTE — PROGRESS NOTES
PT DAILY TREATMENT NOTE - [facility]     Patient Name: Ruthie Lin  Date:2018  : 1925   [x]  Patient  Verified  Payor: [place] MEDICARE / Plan: [place] MEDICARE PART A & B / Product Type: Medicare /    In time:2:00  Out time:2:55  Total Treatment Time (min): 55  Total Timed Codes (min): 55  1:1 Treatment Time ( [address] only): 40   Visit #: 20 of 25    Treatment Area: Bilateral leg weakness [R29.898]  Unstable gait [R26.81]    SUBJECTIVE  Pain Level (0-10 scale): 0  Any medication changes, allergies to medications, adverse drug reactions, diagnosis change, or new procedure performed?: [x] No    [] Yes (see summary sheet for update)  Subjective functional status/changes:   [] No changes reported  \"Feeling good. I did exercises yesterday. \"    OBJECTIVE      35 min Therapeutic Exercise:  [x] See flow sheet :   Rationale: increase ROM and increase strength to improve the patients ability to perform daily activities with decreased pain and symptom levels    20 min Therapeutic Activity:  [x]  See flow sheet :   Rationale: increase strength, improve coordination, improve balance and increase proprioception  to improve the patients ability to perform daily activities with decreased pain and symptom levels           With   [] TE   [] TA   [] neuro   [] other: Patient Education: [x] Review HEP    [] Progressed/Changed HEP based on:   [] positioning   [] body mechanics   [] transfers   [] heat/ice application    [] other:      Other Objective/Functional Measures:   Cues to increase left step length and decrease left foot drag     Pain Level (0-10 scale) post treatment: 0    ASSESSMENT/Changes in Function: Pt needing consistent cues to improve steps with left LE. Challenged with cone reaches in parallel bars.      Patient will continue to benefit from skilled PT services to modify and progress therapeutic interventions, address functional mobility deficits, address strength deficits, analyze and address soft tissue restrictions, analyze and modify body mechanics/ergonomics, address imbalance/dizziness and instruct in home and community integration to attain remaining goals. []  See Plan of Care  []  See progress note/recertification  []  See Discharge Summary         Progress towards goals / Updated goals:  Short Term Goals: STG- To be accomplished in 3 week(s):  1.  Pt will be independent with HEP to encourage prophylaxis. Eval:HEP dispensed  Last PN: Poor compliance   Current: continued poor compliance, education on importance reinforced with 3 exercises /day recommended      2. Pt will be able to stand > 15 minutes with rest break to wash dishes and complete ADLs with increased ease. Eval: 5-10 minutes  Last PN: 10 min before rest break  Current: no change       Long Term Goals: LTG- To be accomplished in 6 week(s):  1.  Pt will demonstrate ability to complete 5x sit to stands in >20 seconds without UE support to demonstrate improved LE strength. .  Eval:33.24 seconds with bilateral UE assist   Last PN:  heavy reliance on UEs still, 24 seconds today - progressing   Current:improved today with cueing to reinforce proper mechanics    2.  Pt FGA score will improve >/= 4 points to indicate improved functional balance and decrease fall risk  Eval:13/30  Last PN: 13/30   Current: no change      3.  Pt bilateral LE strength will improve to >/= 4+/5 to allow pt to complete transfers with increased ease. Eval: 4/5 bilateral LE  Last PN: 4/5 except 3+/5 hip flexion; performing sit to stand transfer with min use of hands,strength progressing slowly, progressing  Current: progressing     4.  New goal 3/27/18: Pt Pryor Balance score will improve by 6 points to demonstrate improved transfer ability and decreased risk for falls  Last PN :35/56 - progressing    Current: NT    PLAN  [x]  Upgrade activities as tolerated     [x]  Continue plan of care  []  Update interventions per flow sheet       []  Discharge due to:_  []  Other:_ Matt Carranza 5/7/2018  2:24 PM    Future Appointments  Date Time Provider Niraj Light   5/10/2018 3:30 PM Mukesh Sykes [facility]   5/15/2018 1:30 PM Heather Malcolm [facility]   5/17/2018 1:30 PM [facility]

## 2018-05-10 ENCOUNTER — HOSPITAL ENCOUNTER (OUTPATIENT)
Dept: PHYSICAL THERAPY | Age: 83
Discharge: HOME | End: 2018-05-10
Payer: MEDICARE

## 2018-05-10 PROCEDURE — 97112 NEUROMUSCULAR REEDUCATION: CPT

## 2018-05-10 NOTE — PROGRESS NOTES
PT DAILY TREATMENT NOTE - [facility]     Patient Name: Tato Roger  Date:5/10/2018  : 1925   [x]  Patient  Verified  Payor: [place] MEDICARE / Plan: [place] MEDICARE PART A & B / Product Type: Medicare /    In time:345  Out time:425  Total Treatment Time (min): 35  Total Timed Codes (min): 35  1:1 Treatment Time ( [address] only): 30   Visit #: 21 of 25    Treatment Area: Bilateral leg weakness [R29.898]  Unstable gait [R26.81]    SUBJECTIVE  Pain Level (0-10 scale): 0  Any medication changes, allergies to medications, adverse drug reactions, diagnosis change, or new procedure performed?: [x] No    [] Yes (see summary sheet for update)  Subjective functional status/changes:   [] No changes reported  \"I am OK. \"    OBJECTIVE       min Therapeutic Exercise:  [x] See flow sheet :       35 min Neuromuscular Reeducation:  [x]  See flow sheet :transfer training, balance and gait activities   Rationale: increase strength, improve coordination, improve balance and increase proprioception  to improve the patients ability to perform daily activities with decreased pain and symptom levels           With   [] TE   [] TA   [] neuro   [] other: Patient Education: [x] Review HEP    [] Progressed/Changed HEP based on:   [] positioning   [] body mechanics   [] transfers   [] heat/ice application    [] other:      Other Objective/Functional Measures:   Worked on postural alignment during ambulation and standing  Step length, facilitated proper WS,      Pain Level (0-10 scale) post treatment: 0    ASSESSMENT/Changes in Function: Pt needing consistent cues to improve steps with left LE. Challenged with cone reaches in parallel bars.      Patient will continue to benefit from skilled PT services to modify and progress therapeutic interventions, address functional mobility deficits, address strength deficits, analyze and address soft tissue restrictions, analyze and modify body mechanics/ergonomics, address imbalance/dizziness and instruct in home and community integration to attain remaining goals. []  See Plan of Care  []  See progress note/recertification  []  See Discharge Summary         Progress towards goals / Updated goals:  Short Term Goals: STG- To be accomplished in 3 week(s):  1.  Pt will be independent with HEP to encourage prophylaxis. Eval:HEP dispensed  Last PN: Poor compliance   Current: continued poor compliance, education on importance reinforced with 3 exercises /day recommended      2. Pt will be able to stand > 15 minutes with rest break to wash dishes and complete ADLs with increased ease. Eval: 5-10 minutes  Last PN: 10 min before rest break  Current: no change       Long Term Goals: LTG- To be accomplished in 6 week(s):  1.  Pt will demonstrate ability to complete 5x sit to stands in >20 seconds without UE support to demonstrate improved LE strength. .  Eval:33.24 seconds with bilateral UE assist   Last PN:  heavy reliance on UEs still, 24 seconds today - progressing   Current:improved today with cueing to reinforce proper mechanics    2.  Pt FGA score will improve >/= 4 points to indicate improved functional balance and decrease fall risk  Eval:13/30  Last PN: 13/30   Current: no change      3.  Pt bilateral LE strength will improve to >/= 4+/5 to allow pt to complete transfers with increased ease. Eval: 4/5 bilateral LE  Last PN: 4/5 except 3+/5 hip flexion; performing sit to stand transfer with min use of hands,strength progressing slowly, progressing  Current: progressing     4.  New goal 3/27/18: Pt Pryor Balance score will improve by 6 points to demonstrate improved transfer ability and decreased risk for falls  Last PN :35/56 - progressing    Current: NT    PLAN  [x]  Upgrade activities as tolerated     [x]  Continue plan of care  []  Update interventions per flow sheet       []  Discharge due to:_  []  Other:_      Mu Johnson, PT 5/10/2018  2:24 PM    Future Appointments  Date Time Provider Niraj Light 5/15/2018 1:30 PM Doug Mcmullen PTA [facility]   5/17/2018 1:30 PM [facility]

## 2018-05-15 ENCOUNTER — HOSPITAL ENCOUNTER (OUTPATIENT)
Dept: PHYSICAL THERAPY | Age: 83
Discharge: HOME | End: 2018-05-15
Payer: MEDICARE

## 2018-05-15 PROCEDURE — 97112 NEUROMUSCULAR REEDUCATION: CPT

## 2018-05-15 PROCEDURE — 97110 THERAPEUTIC EXERCISES: CPT

## 2018-05-15 NOTE — PROGRESS NOTES
PT DAILY TREATMENT NOTE - [facility]     Patient Name: Tato Roger  Date:5/15/2018  : 1925  [x]  Patient  Verified  Payor: VA MEDICARE / Plan: VA MEDICARE PART A & B / Product Type: Medicare /    In time:1:35  Out time:2:30  Total Treatment Time (min): 55  Total Timed Codes (min): 55  1:1 Treatment Time ( [address] only): 24   Visit #: 22 of 25    Treatment Area: Bilateral leg weakness [R29.898]  Unstable gait [R26.81]    SUBJECTIVE  Pain Level (0-10 scale): 0/10  Any medication changes, allergies to medications, adverse drug reactions, diagnosis change, or new procedure performed?: [x] No    [] Yes (see summary sheet for update)  Subjective functional status/changes:   [] No changes reported  \"No pain I'm just weak today. \"     OBJECTIVE    40 min Therapeutic Exercise:  [x] See flow sheet :   Rationale: increase ROM, increase strength and improve coordination to improve the patients ability to perform ADLs with less pain. 15 min Neuromuscular Re-education:  [x]  See flow sheet :   Rationale: increase ROM, increase strength and improve coordination  to improve the patients ability to perform ADLs with less pain. With   [] TE   [] TA   [] neuro   [] other: Patient Education: [x] Review HEP    [] Progressed/Changed HEP based on:   [] positioning   [] body mechanics   [] transfers   [] heat/ice application    [] other:      Other Objective/Functional Measures:      Pain Level (0-10 scale) post treatment: 0/10    ASSESSMENT/Changes in Function: Pt continues to be challenged by therex. Pt performed amb with HHA and no AD. Pt required VC to discourage furniture surfing. Pt denied pain post tx.      Patient will continue to benefit from skilled PT services to modify and progress therapeutic interventions, address functional mobility deficits, address ROM deficits, address strength deficits, analyze and address soft tissue restrictions, analyze and cue movement patterns, analyze and modify body mechanics/ergonomics and assess and modify postural abnormalities to attain remaining goals. []  See Plan of Care  []  See progress note/recertification  []  See Discharge Summary         Progress towards goals / Updated goals:  Short Term Goals: STG- To be accomplished in 3 week(s):  1.  Pt will be independent with HEP to encourage prophylaxis. Eval:HEP dispensed  Last PN: Poor compliance   Current: continued poor compliance, education on importance reinforced with 3 exercises /day recommended      2. Pt will be able to stand > 15 minutes with rest break to wash dishes and complete ADLs with increased ease. Eval: 5-10 minutes  Last PN: 10 min before rest break  Current: no change       Long Term Goals: LTG- To be accomplished in 6 week(s):  1.  Pt will demonstrate ability to complete 5x sit to stands in >20 seconds without UE support to demonstrate improved LE strength. .  Eval:33.24 seconds with bilateral UE assist   Last PN:  heavy reliance on UEs still, 24 seconds today - progressing   Current:improved today with cueing to reinforce proper mechanics     2.  Pt FGA score will improve >/= 4 points to indicate improved functional balance and decrease fall risk  Eval:13/30  Last PN: 13/30   Current: no change      3.  Pt bilateral LE strength will improve to >/= 4+/5 to allow pt to complete transfers with increased ease. Eval: 4/5 bilateral LE  Last PN: 4/5 except 3+/5 hip flexion; performing sit to stand transfer with min use of hands,strength progressing slowly, progressing  Current: progressing      4.  New goal 3/27/18: Pt Pryor Balance score will improve by 6 points to demonstrate improved transfer ability and decreased risk for falls  Last PN :35/56 - progressing    Current: NT    PLAN  [x]  Upgrade activities as tolerated     [x]  Continue plan of care  []  Update interventions per flow sheet       []  Discharge due to:_  []  Other:_      Kristi Jensen, PTA 5/15/2018  1:46 PM    Future Appointments  Date Time Provider Niraj Light   5/31/2018 1:30 PM Heike Wiley, PT [facility]

## 2018-05-17 ENCOUNTER — APPOINTMENT (OUTPATIENT)
Dept: PHYSICAL THERAPY | Age: 83
End: 2018-05-17
Payer: MEDICARE

## 2018-05-31 ENCOUNTER — HOSPITAL ENCOUNTER (OUTPATIENT)
Dept: PHYSICAL THERAPY | Age: 83
Discharge: HOME | End: 2018-05-31
Payer: MEDICARE

## 2018-05-31 PROCEDURE — 97530 THERAPEUTIC ACTIVITIES: CPT

## 2018-05-31 PROCEDURE — G8978 MOBILITY CURRENT STATUS: HCPCS

## 2018-05-31 PROCEDURE — G8979 MOBILITY GOAL STATUS: HCPCS

## 2018-05-31 NOTE — PROGRESS NOTES
PT DAILY TREATMENT NOTE - [facility]     Patient Name: Jhonatan Blank  Date:2018  : 1925  [x]  Patient  Verified  Payor: VA MEDICARE / Plan: VA MEDICARE PART A & B / Product Type: Medicare /    In time:1:15  Out time:2:15  Total Treatment Time (min): 60  Total Timed Codes (min): 60  1:1 Treatment Time ([address] only): 35   Visit #: 23 of 25    Treatment Area: Bilateral leg weakness [R29.898]  Unstable gait [R26.81]    SUBJECTIVE  Pain Level (0-10 scale): 0  Any medication changes, allergies to medications, adverse drug reactions, diagnosis change, or new procedure performed?: [x] No    [] Yes (see summary sheet for update)  Subjective functional status/changes:   [] No changes reported  \"I'm tired. I didn't get home until 11 last night. I went to my grandYoicss graduation and did a lot of walking. \"    OBJECTIVE      25 min Therapeutic Exercise:  [x] See flow sheet :   Rationale: increase ROM and increase strength to improve the patients ability to perform daily activities with decreased pain and symptom levels    35 min Therapeutic Activity:  []  See flow sheet :   Rationale: increase ROM, increase strength, improve coordination, improve balance and increase proprioception  to improve the patients ability to perform daily activities with decreased pain and symptom levels       With   [] TE   [] TA   [] neuro   [] other: Patient Education: [x] Review HEP    [] Progressed/Changed HEP based on:   [] positioning   [] body mechanics   [] transfers   [] heat/ice application    [] other:      Other Objective/Functional Measures:   FOTO score 64/100   5x sit to stand 28 sec  Pryor Balance 32/56    Pain Level (0-10 scale) post treatment: 0    ASSESSMENT/Changes in Function: Pt is making progress towards goals with meeting FOTO score and improved walking and standing tolerance with attending grandson's graduation.   Ambulates with RW still with forward flexion needing cues to correct as well as not to drag left LE. No significant change in Pryor, FGA or 5x sit to stands test possibly due to increased fatigue today. Able to complete floor to stand transfer today with CGA however needing mod A to stand back up due to fatigue on second trial. Pt would beneift from continued skilled PT services for 2 more weeks then discharge to [facility]. Patient will continue to benefit from skilled PT services to modify and progress therapeutic interventions, address functional mobility deficits, address strength deficits, analyze and modify body mechanics/ergonomics, assess and modify postural abnormalities, address imbalance/dizziness and instruct in home and community integration to attain remaining goals. []  See Plan of Care  []  See progress note/recertification  []  See Discharge Summary         Progress towards goals / Updated goals:  Short Term Goals: STG- To be accomplished in 3 week(s):  1.  Pt will be independent with HEP to encourage prophylaxis. Eval:HEP dispensed  Last PN: Poor compliance   Current: continued poor compliance with only walking, education on importance reinforced at least 2x daily - progressing        2. Pt will be able to stand > 15 minutes with rest break to wash dishes and complete ADLs with increased ease. Eval: 5-10 minutes  Last PN: 10 min before rest break  Current: able to walk around graduation with rest break around 15-20 minutes goal Met      Long Term Goals: LTG- To be accomplished in 6 week(s):  1.  Pt will demonstrate ability to complete 5x sit to stands in >20 seconds without UE support to demonstrate improved LE strength. .  Eval:33.24 seconds with bilateral UE assist   Last PN:  heavy reliance on UEs still, 24 seconds today   Current: 28 seconds due to increased fatigue today, heavily UE use - progressing       2.  Pt FGA score will improve >/= 4 points to indicate improved functional balance and decrease fall risk  Eval:13/30  Last PN: 13/30   Current: no change - recommend DC goal      3.  Pt bilateral LE strength will improve to >/= 4+/5 to allow pt to complete transfers with increased ease. Eval: 4/5 bilateral LE  Last PN: 4/5 except 3+/5 hip flexion; performing sit to stand transfer with min use of hands,strength progressing slowly, progressing  Current: 4+/5 HS and quads , 4/5 hip flexion, mod A floor<>stand transfer progressing       4.  New goal 3/27/18: Pt Pryor Balance score will improve by 6 points to demonstrate improved transfer ability and decreased risk for falls  Last PN :35/56  Current: 32/56 - slight regression possibly due to increased fatigue today    PLAN  [x]  Upgrade activities as tolerated     [x]  Continue plan of care  []  Update interventions per flow sheet       []  Discharge due to:_  []  Other:_      Wallace Carranza 5/31/2018  1:25 PM    Future Appointments  Date Time Provider Niraj Light   5/31/2018 1:30 PM Ramsey Sanchez [facility]

## 2018-05-31 NOTE — PROGRESS NOTES
[facility] at [facility]. 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  Pt will be independent with HEP to encourage prophylaxis. Eval:HEP dispensed  Last PN: Poor compliance   Current: continued poor compliance with only walking, education on importance reinforced at least 2x daily - progressing       2. Pt will be able to stand > 15 minutes with rest break to wash dishes and complete ADLs with increased ease. Eval: 5-10 minutes  Last PN: 10 min before rest break  Current: able to walk around graduation with rest break around 15-20 minutes goal Met      Long Term Goals: LTG- To be accomplished in 6 week(s):  1.  Pt will demonstrate ability to complete 5x sit to stands in >20 seconds without UE support to demonstrate improved LE strength. .  Eval:33.24 seconds with bilateral UE assist   Last PN:  heavy reliance on UEs still, 24 seconds today   Current: 28 seconds due to increased fatigue today, heavily UE use - progressing       2.  Pt FGA score will improve >/= 4 points to indicate improved functional balance and decrease fall risk  Eval:  Last PN: 13/30   Current: no change - recommend DC goal      3.  Pt bilateral LE strength will improve to >/= 4+/5 to allow pt to complete transfers with increased ease. Eval: 4/5 bilateral LE  Last PN: 4/5 except 3+/5 hip flexion; performing sit to stand transfer with min use of hands,strength progressing slowly, progressing  Current: 4+/5 HS and quads , 4/5 hip flexion, mod A floor<>stand transfer progressing       4. New goal 3/27/18: Pt Pryor Balance score will improve by 6 points to demonstrate improved transfer ability and decreased risk for falls  Last PN :35/56  Current: 32/56 - slight regression possibly due to increased fatigue today    Key functional changes: improved LE strength, standing and walking tolerance      Problems/ barriers to goal attainment: cues to decreased use of UE and furniture crawl with ambulation without AD, decreased balance, decreased sit to stand and floor to stand transfer ability     Problem List: pain affecting function, decrease strength, impaired gait/ balance, decrease ADL/ functional abilitiies, decrease activity tolerance, decrease flexibility/ joint mobility and decrease transfer abilities    Treatment Plan: Therapeutic exercise, Therapeutic activities, Neuromuscular re-education, Gait/balance training, Patient education, Self Care training and Functional mobility training     Patient Goal (s) has been updated and includes: \"Be more secure. \"    Goals for this certification period to be accomplished in 2 weeks:  See unmet above    Frequency / Duration: Patient to be seen 2 times per week for 2 weeks:    Assessment / Recommendations:Pt is making progress towards goals with meeting FOTO score and improved walking and standing tolerance with attending grandson's graduation. Ambulates with RW still with forward flexion needing cues to correct as well as not to drag left LE. No significant change in Pryor, FGA or 5x sit to stands test possibly due to increased fatigue today.  Able to complete floor to stand transfer today with CGA however needing mod A to stand back up due to fatigue on second trial. Pt would beneift from continued skilled PT services for 2 more weeks then discharge to [facility]. G-Codes (GP)  Mobility  [id number] Current  CJ= 20-39%  [id number] Goal  CK= 40-59%    The severity rating is based on clinical judgment and the FOTO score. Certification Period: 5/30/18 - 6/29/18    Timur MARX Remesic 5/31/2018 3:14 PM    ________________________________________________________________________  I certify that the above Therapy Services are being furnished while the patient is under my care. I agree with the treatment plan and certify that this therapy is necessary. [] I have read the above and request that my patient continue as recommended. [] I have read the above report and request that my patient continue therapy with the following changes/special instructions: _______________________________________  [] I have read the above report and request that my patient be discharged from therapy    Physician's Signature:________________________________Date:___________Time:__________    Please sign and return to [facility] at [address] [place]  Phone: [phone] Fax: [phone]

## 2018-06-05 ENCOUNTER — HOSPITAL ENCOUNTER (OUTPATIENT)
Dept: PHYSICAL THERAPY | Age: 83
Discharge: HOME | End: 2018-06-05
Payer: MEDICARE

## 2018-06-05 PROCEDURE — 97112 NEUROMUSCULAR REEDUCATION: CPT

## 2018-06-05 PROCEDURE — 97530 THERAPEUTIC ACTIVITIES: CPT

## 2018-06-05 PROCEDURE — 97110 THERAPEUTIC EXERCISES: CPT

## 2018-06-05 NOTE — PROGRESS NOTES
PT DAILY TREATMENT NOTE - [facility]     Patient Name: Yousif Deng  Date:2018  : 1925  [x]  Patient  Verified  Payor: [place] MEDICARE / Plan: [place] MEDICARE PART A & B / Product Type: Medicare /    In time:1:30  Out time:2:30  Total Treatment Time (min): 60  Total Timed Codes (min): 60  1:1 Treatment Time ( [address] only): 60   Visit #: 24     Treatment Area: Bilateral leg weakness [R29.898]  Unstable gait [R26.81]    SUBJECTIVE  Pain Level (0-10 scale): 0/10  Any medication changes, allergies to medications, adverse drug reactions, diagnosis change, or new procedure performed?: [x] No    [] Yes (see summary sheet for update)  Subjective functional status/changes:   [] No changes reported  \"I don't have any pain, just stiff neck. \"    OBJECTIVE      30 min Therapeutic Exercise:  [x] See flow sheet :   Rationale: increase ROM, increase strength and improve coordination to improve the patients ability to return to prior level of physical activity. 12 min Therapeutic Activity:  [x]  See flow sheet :   Rationale: increase ROM, increase strength and improve coordination  to improve the patients ability to return to prior level of physical activity. 18 min Neuromuscular Re-education:  [x]  See flow sheet :   Rationale: increase ROM, increase strength and improve coordination  to improve the patients ability to return to prior level of physical activity. With   [] TE   [] TA   [] neuro   [] other: Patient Education: [x] Review HEP    [] Progressed/Changed HEP based on:   [] positioning   [] body mechanics   [] transfers   [] heat/ice application    [] other:      Other Objective/Functional Measures:      Pain Level (0-10 scale) post treatment: 0/10    ASSESSMENT/Changes in Function: Pt continues to be greatly fatigue by therex but no increased pain. Pt reported mild dizziness with supine to sit transfer from bed but resolved soon after. Pt required CGA/Min-a for correction during amb with SPC. Pt requires VC to amb with increased step length. Patient will continue to benefit from skilled PT services to modify and progress therapeutic interventions, address functional mobility deficits, address ROM deficits, address strength deficits, analyze and address soft tissue restrictions, analyze and cue movement patterns, analyze and modify body mechanics/ergonomics and assess and modify postural abnormalities to attain remaining goals. []  See Plan of Care  []  See progress note/recertification  []  See Discharge Summary         Progress towards goals / Updated goals:  Short Term Goals: STG- To be accomplished in 3 week(s):  1.  Pt will be independent with HEP to encourage prophylaxis. Eval:HEP dispensed  Last PN: Poor compliance   Current: continued poor compliance with only walking, education on importance reinforced at least 2x daily - progressing       2. Pt will be able to stand > 15 minutes with rest break to wash dishes and complete ADLs with increased ease. Eval: 5-10 minutes  Last PN: 10 min before rest break  Current: able to walk around graduation with rest break around 15-20 minutes goal Met      Long Term Goals: LTG- To be accomplished in 6 week(s):  1.  Pt will demonstrate ability to complete 5x sit to stands in >20 seconds without UE support to demonstrate improved LE strength. .  Eval:33.24 seconds with bilateral UE assist   Last PN:  heavy reliance on UEs still, 24 seconds today   Current: 28 seconds due to increased fatigue today, heavily UE use - progressing       2.  Pt FGA score will improve >/= 4 points to indicate improved functional balance and decrease fall risk  Eval:13/30  Last PN: 13/30   Current: no change - recommend DC goal      3.  Pt bilateral LE strength will improve to >/= 4+/5 to allow pt to complete transfers with increased ease.   Eval: 4/5 bilateral LE  Last PN: 4/5 except 3+/5 hip flexion; performing sit to stand transfer with min use of hands,strength progressing slowly, progressing  Current: 4+/5 HS and quads , 4/5 hip flexion, mod A floor<>stand transfer progressing       4.  New goal 3/27/18: Pt Pryor Balance score will improve by 6 points to demonstrate improved transfer ability and decreased risk for falls  Last PN :35/56  Current: 32/56 - slight regression possibly due to increased fatigue today       PLAN  [x]  Upgrade activities as tolerated     [x]  Continue plan of care  []  Update interventions per flow sheet       []  Discharge due to:_  []  Other:_      Dalton Gerardo, ASHU 6/5/2018  1:36 PM    Future Appointments  Date Time Provider Niraj Light   6/8/2018 1:00 PM Vero Yeboah, PT [facility]   6/12/2018 1:00 PM [facility] PT [id number] [facility]   6/14/2018 1:00 PM Geovanna Jung, ADRYL [facility]

## 2018-06-08 ENCOUNTER — APPOINTMENT (OUTPATIENT)
Dept: PHYSICAL THERAPY | Age: 83
End: 2018-06-08
Payer: MEDICARE

## 2018-06-13 ENCOUNTER — APPOINTMENT (OUTPATIENT)
Dept: PHYSICAL THERAPY | Age: 83
End: 2018-06-13
Payer: MEDICARE

## 2018-06-14 ENCOUNTER — HOSPITAL ENCOUNTER (OUTPATIENT)
Dept: PHYSICAL THERAPY | Age: 83
Discharge: HOME | End: 2018-06-14
Payer: MEDICARE

## 2018-06-14 PROCEDURE — 97112 NEUROMUSCULAR REEDUCATION: CPT

## 2018-06-14 PROCEDURE — 97110 THERAPEUTIC EXERCISES: CPT

## 2018-06-14 NOTE — PROGRESS NOTES
PT DAILY TREATMENT NOTE - [facility]     Patient Name: Martha Maloney  Date:2018  : 1925  [x]  Patient  Verified  Payor: [place] MEDICARE / Plan: [place] MEDICARE PART A & B / Product Type: Medicare /    In time:105  Out time:154  Total Treatment Time (min): 49  Total Timed Codes (min): 45  1:1 Treatment Time ( [address] only): 52   Visit #: 25  28    Treatment Area: Bilateral leg weakness [R29.898]  Unstable gait [R26.81]    SUBJECTIVE  Pain Level (0-10 scale): 0/10  Any medication changes, allergies to medications, adverse drug reactions, diagnosis change, or new procedure performed?: [x] No    [] Yes (see summary sheet for update)  Subjective functional status/changes:   [] No changes reported  Doing OK. I am tired    OBJECTIVE      20 min Therapeutic Exercise:  [x] See flow sheet :   Rationale: increase ROM, increase strength and improve coordination to improve the patients ability to return to prior level of physical activity. min Therapeutic Activity:  [x]  See flow sheet :   Rationale: increase ROM, increase strength and improve coordination  to improve the patients ability to return to prior level of physical activity. 29 min Neuromuscular Re-education:  [x]  See flow sheet :focus on ambulatory balance, large reciprocal movements including arm swing and steps to facilitate more normal walking pattern   Rationale: increase ROM, increase strength and improve coordination  to improve the patients ability to return to prior level of physical activity.        With   [] TE   [] TA   [] neuro   [] other: Patient Education: [x] Review HEP    [] Progressed/Changed HEP based on:   [] positioning   [] body mechanics   [] transfers   [] heat/ice application    [] other:      Other Objective/Functional Measures:   Needs constant encouragement and verbal cues for ex  External assist required with all ambulation without AD  Unable to maintain unsupported sitting position on swiss ball     Pain Level (0-10 scale) post treatment: 0/10    ASSESSMENT/Changes in Function: Pt continues to be greatly fatigue by therex but no increased pain. Pt reported mild dizziness with supine to sit transfer from bed but resolved soon after. Pt required CGA/Min-a for correction during amb with SPC. Pt requires VC to amb with increased step length. Patient will continue to benefit from skilled PT services to modify and progress therapeutic interventions, address functional mobility deficits, address ROM deficits, address strength deficits, analyze and address soft tissue restrictions, analyze and cue movement patterns, analyze and modify body mechanics/ergonomics and assess and modify postural abnormalities to attain remaining goals. [x]  See Plan of Care  []  See progress note/recertification  []  See Discharge Summary         Progress towards goals / Updated goals:  Short Term Goals: STG- To be accomplished in 3 week(s):  1.  Pt will be independent with HEP to encourage prophylaxis. Eval:HEP dispensed  Last PN: Poor compliance   Current: continued poor compliance with only walking, education on importance reinforced at least 2x daily - progressing       2. Pt will be able to stand > 15 minutes with rest break to wash dishes and complete ADLs with increased ease. Eval: 5-10 minutes  Last PN: 10 min before rest break  Current: able to walk around graduation with rest break around 15-20 minutes goal Met      Long Term Goals: LTG- To be accomplished in 6 week(s):  1.  Pt will demonstrate ability to complete 5x sit to stands in >20 seconds without UE support to demonstrate improved LE strength. .  Eval:33.24 seconds with bilateral UE assist   Last PN:  heavy reliance on UEs still, 24 seconds today   Current: 28 seconds due to increased fatigue today, heavily UE use - progressing       2.  Pt FGA score will improve >/= 4 points to indicate improved functional balance and decrease fall risk  Eval:13/30  Last PN: 13/30   Current: no change - recommend DC goal      3.  Pt bilateral LE strength will improve to >/= 4+/5 to allow pt to complete transfers with increased ease. Eval: 4/5 bilateral LE  Last PN: 4/5 except 3+/5 hip flexion; performing sit to stand transfer with min use of hands,strength progressing slowly, progressing  Current: 4+/5 HS and quads , 4/5 hip flexion, mod A floor<>stand transfer progressing       4. New goal 3/27/18: Pt Pryor Balance score will improve by 6 points to demonstrate improved transfer ability and decreased risk for falls  Last PN :35/56  Current: 32/56 - slight regression possibly due to increased fatigue today       PLAN  [x]  Upgrade activities as tolerated     [x]  Continue plan of care  []  Update interventions per flow sheet       []  Discharge due to:_  []  Other:_      Connie Kwon, PT 6/14/2018  1:36 PM    No future appointments.

## 2018-06-15 ENCOUNTER — APPOINTMENT (OUTPATIENT)
Dept: PHYSICAL THERAPY | Age: 83
End: 2018-06-15
Payer: MEDICARE

## 2018-06-21 ENCOUNTER — HOSPITAL ENCOUNTER (OUTPATIENT)
Dept: PHYSICAL THERAPY | Age: 83
Discharge: HOME | End: 2018-06-21
Payer: MEDICARE

## 2018-06-21 PROCEDURE — 97112 NEUROMUSCULAR REEDUCATION: CPT | Performed by: PHYSICAL THERAPIST

## 2018-06-21 PROCEDURE — 97110 THERAPEUTIC EXERCISES: CPT | Performed by: PHYSICAL THERAPIST

## 2018-06-21 NOTE — PROGRESS NOTES
PT DAILY TREATMENT NOTE - [facility]     Patient Name: Jhonatan Blank  Date:2018  : 1925  [x]  Patient  Verified  Payor: VA MEDICARE / Plan: VA MEDICARE PART A & B / Product Type: Medicare /    In time:1330  Out time:1432  Total Treatment Time (min): 62  Total Timed Codes (min): 62  1:1 Treatment Time ( [address] only): 54    Visit #: 26 of 27    Treatment Area: Bilateral leg weakness [R29.898]  Unstable gait [R26.81]    SUBJECTIVE  Pain Level (0-10 scale): 0  Any medication changes, allergies to medications, adverse drug reactions, diagnosis change, or new procedure performed?: [x] No    [] Yes (see summary sheet for update)  Subjective functional status/changes:   [x] No changes reported      OBJECTIVE  30 min Therapeutic Exercise:  [x] See flow sheet :   Rationale: increase ROM, increase strength and improve coordination to improve the patients ability to return to prior level of physical activity.          32 min Neuromuscular Re-education:  [x]  See flow sheet :focus on ambulatory balance, large reciprocal movements including arm swing and steps to facilitate more normal walking pattern   Rationale: increase ROM, increase strength and improve coordination  to improve the patients ability to return to prior level of physical activity.            With   [] TE   [] TA   [] neuro   [] other: Patient Education: [x] Review HEP    [] Progressed/Changed HEP based on:   [] positioning   [] body mechanics   [] transfers   [] heat/ice application    [] other:      Other Objective/Functional Measures: see ex performed      Pain Level (0-10 scale) post treatment: 0    ASSESSMENT/Changes in Function: cues for upright increase stride and stay close to walker , less fatigue today     Patient will continue to benefit from skilled PT services to modify and progress therapeutic interventions, address functional mobility deficits, address ROM deficits, address strength deficits, analyze and address soft tissue restrictions, analyze and cue movement patterns, analyze and modify body mechanics/ergonomics, assess and modify postural abnormalities and address imbalance/dizziness to attain remaining goals. [x]  See Plan of Care  []  See progress note/recertification  []  See Discharge Summary         Progress towards goals / Updated goals:  Short Term Goals: STG- To be accomplished in 3 week(s):  1.  Pt will be independent with HEP to encourage prophylaxis. Eval:HEP dispensed  Last PN: Poor compliance   Current: continued poor compliance with only walking, education on importance reinforced at least 2x daily - progressing       2. Pt will be able to stand > 15 minutes with rest break to wash dishes and complete ADLs with increased ease. Eval: 5-10 minutes  Last PN: 10 min before rest break  Current: able to walk around graduation with rest break around 15-20 minutes goal Met      Long Term Goals: LTG- To be accomplished in 6 week(s):  1.  Pt will demonstrate ability to complete 5x sit to stands in >20 seconds without UE support to demonstrate improved LE strength. .  Eval:33.24 seconds with bilateral UE assist   Last PN:  heavy reliance on UEs still, 24 seconds today   Current: 28 seconds due to increased fatigue today, heavily UE use - progressing       2.  Pt FGA score will improve >/= 4 points to indicate improved functional balance and decrease fall risk  Eval:13/30  Last PN: 13/30   Current: no change - recommend DC goal      3.  Pt bilateral LE strength will improve to >/= 4+/5 to allow pt to complete transfers with increased ease. Eval: 4/5 bilateral LE  Last PN: 4/5 except 3+/5 hip flexion; performing sit to stand transfer with min use of hands,strength progressing slowly, progressing  Current: 4+/5 HS and quads , 4/5 hip flexion, mod A floor<>stand transfer progressing       4.  New goal 3/27/18: Pt Pryor Balance score will improve by 6 points to demonstrate improved transfer ability and decreased risk for falls  Last PN :35/56  Current: 32/56 - slight regression possibly due to increased fatigue today    PLAN  [x]  Upgrade activities as tolerated     [x]  Continue plan of care  []  Update interventions per flow sheet       []  Discharge due to:_  []  Other:_      India Kim, PT 6/21/2018  5:27 PM    Future Appointments  Date Time Provider Niraj Light   6/25/2018 1:00 PM Nicol Meng, PT [facility]

## 2018-06-25 ENCOUNTER — HOSPITAL ENCOUNTER (OUTPATIENT)
Dept: PHYSICAL THERAPY | Age: 83
Discharge: HOME | End: 2018-06-25
Payer: MEDICARE

## 2018-06-25 PROCEDURE — 97530 THERAPEUTIC ACTIVITIES: CPT

## 2018-06-25 PROCEDURE — G8978 MOBILITY CURRENT STATUS: HCPCS

## 2018-06-25 PROCEDURE — G8979 MOBILITY GOAL STATUS: HCPCS

## 2018-06-25 NOTE — PROGRESS NOTES
PT DAILY TREATMENT NOTE - [facility]     Patient Name: Elvin Mancini  Date:2018  : 1925  [x]  Patient  Verified  Payor: [place] MEDICARE / Plan: VA MEDICARE PART A & B / Product Type: Medicare /    In time:1300  Out time:1400  Total Treatment Time (min): 60  Total Timed Codes (min): 60  1:1 Treatment Time ( [address] only): 30    Visit #: 27 of 27 + 8    Treatment Area: Bilateral leg weakness [R29.898]  Unstable gait [R26.81]    SUBJECTIVE  Pain Level (0-10 scale): 0  Any medication changes, allergies to medications, adverse drug reactions, diagnosis change, or new procedure performed?: [x] No    [] Yes (see summary sheet for update)  Subjective functional status/changes:   [x] No changes reported  I don't think I am improving. The problem is that I am not doing anything between the sessions      OBJECTIVE  30 min Therapeutic Exercise:  [x] See flow sheet :   Rationale: increase ROM, increase strength and improve coordination to improve the patients ability to return to prior level of physical activity.          30 min Therapeutic Activities:  [x]  See flow sheet :focus on ambulatory balance, large reciprocal movements including arm swing and steps to facilitate more normal walking pattern, reevaluation, discussed need to increase activity level at home in order to facilitate progress in function   Rationale: increase ROM, increase strength and improve coordination  to improve the patients ability to return to prior level of physical activity.            With   [] TE   [] TA   [] neuro   [] other: Patient Education: [x] Review HEP    [] Progressed/Changed HEP based on:   [] positioning   [] body mechanics   [] transfers   [] heat/ice application    [] other:      Other Objective/Functional Measures:   FOTO 55, regressed  Pryor Balance score 29/56, regressed     Pain Level (0-10 scale) post treatment: 0    ASSESSMENT/Changes in Function: cues for upright increase stride and stay close to walker , less fatigue today     Patient will continue to benefit from skilled PT services to modify and progress therapeutic interventions, address functional mobility deficits, address ROM deficits, address strength deficits, analyze and address soft tissue restrictions, analyze and cue movement patterns, analyze and modify body mechanics/ergonomics, assess and modify postural abnormalities and address imbalance/dizziness to attain remaining goals. [x]  See Plan of Care  [x]  See progress note/recertification  []  See Discharge Summary         Progress towards goals / Updated goals:  Short Term Goals: STG- To be accomplished in 3 week(s):  1.  Pt will be independent with HEP to encourage prophylaxis. Eval:HEP dispensed  Last PN: Poor compliance   Current: continued poor compliance with only limited walking, discussed need to increase daily activity level, if no improvement in 4 weeks I will recommend d/c at that time - no progress      2. Pt will be able to stand > 15 minutes with rest break to wash dishes and complete ADLs with increased ease. Eval: 5-10 minutes  Last PN: 10 min before rest break  Current: able to walk around graduation with rest break around 15-20 minutes goal Met      Long Term Goals: LTG- To be accomplished in 6 week(s):  1.  Pt will demonstrate ability to complete 5x sit to stands in >20 seconds without UE support to demonstrate improved LE strength. .  Eval:33.24 seconds with bilateral UE assist   Last PN:  heavy reliance on UEs still, 24 seconds today   Current: 45 seconds due to increased fatigue today, heavily UE use - no progress       2.  Pt FGA score will improve >/= 4 points to indicate improved functional balance and decrease fall risk  Eval:13/30  Last PN: 13/30   Current: no change - recommend DC goal      3.  Pt bilateral LE strength will improve to >/= 4+/5 to allow pt to complete transfers with increased ease.   Eval: 4/5 bilateral LE  Last PN: 4/5 except 3+/5 hip flexion; performing sit to stand transfer with min use of hands,strength progressing slowly,   Current: 4+/5 HS and quads , 4/5 hip flexion, mod A floor<>stand transfer progressing       4. New goal 3/27/18: Pt Pryor Balance score (32/56) will improve by 6 points to demonstrate improved transfer ability and decreased risk for falls  Last PN :35/56  Current: 29/56 -  regression possibly due to increased fatigue today, no progress    PLAN  [x]  Upgrade activities as tolerated     [x]  Continue plan of care for 4 weeks with focus on HEP, increased activity level and dynamic gait/balance activities  []  Update interventions per flow sheet       []  Discharge due to:_  []  Other:_      Connie Kwon, PT 6/25/2018  5:27 PM    No future appointments.

## 2018-06-26 NOTE — PROGRESS NOTES
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  Pt will be independent with HEP to encourage prophylaxis. Eval:HEP dispensed  Last PN: Poor compliance   Current: continued poor compliance with only limited walking, discussed need to increase daily activity level, if no improvement in 4 weeks I will recommend d/c at that time - no progress      2. Pt will be able to stand > 15 minutes with rest break to wash dishes and complete ADLs with increased ease. Eval: 5-10 minutes  Last PN: 10 min before rest break  Current: able to walk around graduation with rest break around 15-20 minutes goal Met      Long Term Goals: LTG- To be accomplished in 6 week(s):  1.  Pt will demonstrate ability to complete 5x sit to stands in >20 seconds without UE support to demonstrate improved LE strength. .  Eval:33.24 seconds with bilateral UE assist   Last PN:  heavy reliance on UEs still, 24 seconds today   Current: 45 seconds due to increased fatigue today, heavily UE use - no progress       2.  Pt FGA score will improve >/= 4 points to indicate improved functional balance and decrease fall risk  Eval:13/30  Last PN: 13/30   Current: no change - recommend DC goal      3.  Pt bilateral LE strength will improve to >/= 4+/5 to allow pt to complete transfers with increased ease. Eval: 4/5 bilateral LE  Last PN: 4/5 except 3+/5 hip flexion; performing sit to stand transfer with min use of hands,strength progressing slowly,   Current: 4+/5 HS and quads , 4/5 hip flexion, mod A floor<>stand transfer progressing       4. New goal 3/27/18: Pt Pryor Balance score (32/56) will improve by 6 points to demonstrate improved transfer ability and decreased risk for falls  Last PN :35/56  Current: 29/56 -  regression possibly due to increased fatigue today, no progress    Key functional changes: improved functional strength, increased ease with transfers      Problems/ barriers to goal attainment: fatigue, general weakness, balance deficit, fall risk     Problem List: decrease strength, impaired gait/ balance, decrease flexibility/ joint mobility and decrease transfer abilities    Treatment Plan: Therapeutic exercise, Therapeutic activities, Neuromuscular re-education, Physical agent/modality, Gait/balance training and Patient education         Frequency / Duration: Patient to be seen 2 times per week for 4 weeks:    Assessment / Recommendations:Mrs. Bora Erickson has been showing some progress in strength and standing tolerance but overall her progress has been extremely slow with recent regression of scores in regards to balance, '5 times sit to stand test ' and FOTO score. I have discussed the need to increase her activity level at home including daily ambulation to assure overall progress. I recommend to continue for 4 more weeks to address remaining goals and functional deficits.     G-Codes (GP)  Mobility  [id number] Current  CK= 40-59%  [id number] Goal  CK= 40-59%    The severity rating is based on clinical judgment and the [facility] score.    Certification Period: 6/30/18-7/29/18    Nicol Meng, PT 6/26/2018 9:51 AM    ________________________________________________________________________  I certify that the above Therapy Services are being furnished while the patient is under my care. I agree with the treatment plan and certify that this therapy is necessary. [] I have read the above and request that my patient continue as recommended. [] I have read the above report and request that my patient continue therapy with the following changes/special instructions: ______________________________________  [] I have read the above report and request that my patient be discharged from therapy    Physician's Signature:_______________________________Date:___________Time:__________    Please sign and return to   [facility] in [address]  Alena Schwarz [address] [place]  Phone: [phone]      Fax:  [phone]

## 2018-07-03 ENCOUNTER — APPOINTMENT (OUTPATIENT)
Dept: PHYSICAL THERAPY | Age: 83
End: 2018-07-03
Payer: MEDICARE

## 2018-07-05 ENCOUNTER — HOSPITAL ENCOUNTER (OUTPATIENT)
Dept: PHYSICAL THERAPY | Age: 83
Discharge: HOME | End: 2018-07-05
Payer: MEDICARE

## 2018-07-05 PROCEDURE — 97110 THERAPEUTIC EXERCISES: CPT

## 2018-07-05 PROCEDURE — 97116 GAIT TRAINING THERAPY: CPT

## 2018-07-05 PROCEDURE — 97530 THERAPEUTIC ACTIVITIES: CPT

## 2018-07-05 NOTE — PROGRESS NOTES
PT DAILY TREATMENT NOTE - [facility]     Patient Name: Norma Mccoy  Date:2018  : 1925  [x]  Patient  Verified  Payor: [place] MED[place]ARE / Plan: [place] MEDICARE PART A & B / Product Type: Medicare /    In time:1300  Out time:1400  Total Treatment Time (min): 60  Total Timed Codes (min): 60  1:1 Treatment Time ( [address] only): 30    Visit #: 27 of 27 + 8    Treatment Area: Bilateral leg weakness [R29.898]  Unstable gait [R26.81]    SUBJECTIVE  Pain Level (0-10 scale): 0  Any medication changes, allergies to medications, adverse drug reactions, diagnosis change, or new procedure performed?: [x] No    [] Yes (see summary sheet for update)  Subjective functional status/changes:   [x] No changes reported  I don't think I am improving. The problem is that I am not doing anything between the sessions      OBJECTIVE  15 min Therapeutic Exercise:  [x] See flow sheet :   Rationale: increase ROM, increase strength and improve coordination to improve the patients ability to return to prior level of physical activity.          20 min Therapeutic Activities:  [x]  See flow sheet :focus on static and dynamic  balance, transfer training with focus on WS, big amplitude movements, trunk Rotation, postural training, discussed need for increased ambulation on days without PT    Rationale: increase ROM, increase strength and improve coordination  to improve the patients ability to return to prior level of physical activity.     15 min gait training with focus on step length, reduced HAL, step height, arm swing           With   [] TE   [] TA   [] neuro   [] other: Patient Education: [x] Review HEP    [] Progressed/Changed HEP based on:   [] positioning   [] body mechanics   [] transfers   [] heat/ice application    [] other:      Other Objective/Functional Measures:   Fear of falling  Wide HAL  Needs external assist with ambulation when not using AD  Ability to perform Rhomberg for 10 sec with SBA     Pain Level (0-10 scale) post treatment: 0    ASSESSMENT/Changes in Function: cues for upright increase stride and stay close to walker , less fatigue today     Patient will continue to benefit from skilled PT services to modify and progress therapeutic interventions, address functional mobility deficits, address ROM deficits, address strength deficits, analyze and address soft tissue restrictions, analyze and cue movement patterns, analyze and modify body mechanics/ergonomics, assess and modify postural abnormalities and address imbalance/dizziness to attain remaining goals. [x]  See Plan of Care  [x]  See progress note/recertification  []  See Discharge Summary         Progress towards goals / Updated goals:  Short Term Goals: STG- To be accomplished in 3 week(s):  1.  Pt will be independent with HEP to encourage prophylaxis. Eval:HEP dispensed  Last PN: Poor compliance   Current: continued poor compliance with only limited walking, discussed need to increase daily activity level, if no improvement in 4 weeks I will recommend d/c at that time - no progress      2. Pt will be able to stand > 15 minutes with rest break to wash dishes and complete ADLs with increased ease. Eval: 5-10 minutes  Last PN: 10 min before rest break  Current: able to walk around graduation with rest break around 15-20 minutes goal Met      Long Term Goals: LTG- To be accomplished in 6 week(s):  1.  Pt will demonstrate ability to complete 5x sit to stands in >20 seconds without UE support to demonstrate improved LE strength. .  Eval:33.24 seconds with bilateral UE assist   Last PN:  heavy reliance on UEs still, 24 seconds today   Current: 45 seconds due to increased fatigue today, heavily UE use - no progress       2.  Pt FGA score will improve >/= 4 points to indicate improved functional balance and decrease fall risk  Eval:13/30  Last PN: 13/30   Current: no change - recommend DC goal      3.  Pt bilateral LE strength will improve to >/= 4+/5 to allow pt to complete transfers with increased ease. Eval: 4/5 bilateral LE  Last PN: 4/5 except 3+/5 hip flexion; performing sit to stand transfer with min use of hands,strength progressing slowly,   Current: 4+/5 HS and quads , 4/5 hip flexion, mod A floor<>stand transfer progressing       4.  New goal 3/27/18: Pt Pryor Balance score (32/56) will improve by 6 points to demonstrate improved transfer ability and decreased risk for falls  Last PN :35/56  Current: 29/56 -  regression possibly due to increased fatigue today, no progress    PLAN  [x]  Upgrade activities as tolerated     [x]  Continue plan of care for 4 weeks with focus on HEP, increased activity level and dynamic gait/balance activities  []  Update interventions per flow sheet       []  Discharge due to:_  []  Other:_      Billie He PT 7/5/2018  5:27 PM    Future Appointments  Date Time Provider Niraj Light   7/10/2018 11:00 AM DARYL Chi [facility]   7/12/2018 1:00 PM DARYL Burnham [facility]   7/17/2018 1:00 PM DARYL Burnham [facility]   7/19/2018 1:00 PM DARYL Chi THE [facility]

## 2018-07-10 ENCOUNTER — HOSPITAL ENCOUNTER (OUTPATIENT)
Dept: PHYSICAL THERAPY | Age: 83
Discharge: HOME | End: 2018-07-10
Payer: MEDICARE

## 2018-07-10 PROCEDURE — 97530 THERAPEUTIC ACTIVITIES: CPT

## 2018-07-11 NOTE — PROGRESS NOTES
PT DAILY TREATMENT NOTE - [facility]     Patient Name: Shira Candelaria  Date:7/10/2018  : 1925  [x]  Patient  Verified  Payor: [place] MEDICARE / Plan: [place] MEDICARE PART A & B / Product Type: Medicare /    In time:1104  Out time:1150  Total Treatment Time (min): 46  Total Timed Codes (min): 46  1:1 Treatment Time ( [address] only): 30    Visit #: 28 of 27 + 8    Treatment Area: Bilateral leg weakness [R29.898]  Unstable gait [R26.81]    SUBJECTIVE  Pain Level (0-10 scale): 0  Any medication changes, allergies to medications, adverse drug reactions, diagnosis change, or new procedure performed?: [x] No    [] Yes (see summary sheet for update)  Subjective functional status/changes:   [x] No changes reported  I have been walking more at home      OBJECTIVE  16 min Therapeutic Exercise:  [x] See flow sheet :   Rationale: increase ROM, increase strength and improve coordination to improve the patients ability to return to prior level of physical activity.          30 min Therapeutic Activities:  [x]  See flow sheet :focus on static and dynamic  balance, transfer training with focus on WS, big amplitude movements, trunk Rotation, postural training, gait activities with focus on increased step length and improved WS   Rationale: increase ROM, increase strength and improve coordination  to improve the patients ability to return to prior level of physical activity.                With   [] TE   [] TA   [] neuro   [] other: Patient Education: [x] Review HEP    [] Progressed/Changed HEP based on:   [] positioning   [] body mechanics   [] transfers   [] heat/ice application    [] other:      Other Objective/Functional Measures:   Fear of falling  Wide HAL  Needs external assist with ambulation when not using AD  Ability to perform Rhomberg for 10 sec with SBA     Pain Level (0-10 scale) post treatment: 0    ASSESSMENT/Changes in Function: cues for upright increase stride and stay close to walker , less fatigue today     Patient will continue to benefit from skilled PT services to modify and progress therapeutic interventions, address functional mobility deficits, address ROM deficits, address strength deficits, analyze and address soft tissue restrictions, analyze and cue movement patterns, analyze and modify body mechanics/ergonomics, assess and modify postural abnormalities and address imbalance/dizziness to attain remaining goals. [x]  See Plan of Care  [x]  See progress note/recertification  []  See Discharge Summary         Progress towards goals / Updated goals:  Short Term Goals: STG- To be accomplished in 3 week(s):  1.  Pt will be independent with HEP to encourage prophylaxis. Eval:HEP dispensed  Last PN: Poor compliance   Current: increased activity/walking at home, progressing      2. Pt will be able to stand > 15 minutes with rest break to wash dishes and complete ADLs with increased ease. Eval: 5-10 minutes  Last PN: 10 min before rest break  Current: able to walk around graduation with rest break around 15-20 minutes goal Met      Long Term Goals: LTG- To be accomplished in 6 week(s):  1.  Pt will demonstrate ability to complete 5x sit to stands in >20 seconds without UE support to demonstrate improved LE strength. .  Eval:33.24 seconds with bilateral UE assist   Last PN:  heavy reliance on UEs still, 24 seconds today   Current: 45 seconds due to increased fatigue today, heavily UE use - no progress       2.  Pt FGA score will improve >/= 4 points to indicate improved functional balance and decrease fall risk  Eval:13/30  Last PN: 13/30   Current: no change - recommend DC goal      3.  Pt bilateral LE strength will improve to >/= 4+/5 to allow pt to complete transfers with increased ease.   Eval: 4/5 bilateral LE  Last PN: 4/5 except 3+/5 hip flexion; performing sit to stand transfer with min use of hands,strength progressing slowly,   Current: 4+/5 HS and quads , 4/5 hip flexion, mod A floor<>stand transfer progressing     4. New goal 3/27/18: Pt Pryor Balance score (32/56) will improve by 6 points to demonstrate improved transfer ability and decreased risk for falls  Last PN :35/56  Current: 29/56 -  regression possibly due to increased fatigue today, no progress    PLAN  [x]  Upgrade activities as tolerated     [x]  Continue plan of care for 4 weeks with focus on HEP, increased activity level and dynamic gait/balance activities  []  Update interventions per flow sheet       []  Discharge due to:_  []  Other:_      Savannah Borja PT 7/10/2018  5:27 PM    Future Appointments  Date Time Provider Niraj Light   7/12/2018 1:00 PM Savannah Borja PT [facility]   7/17/2018 1:00 PM DARYL Lara [facility]   7/19/2018 1:00 PM DARYL Lara THE [facility]

## 2018-07-12 ENCOUNTER — HOSPITAL ENCOUNTER (OUTPATIENT)
Dept: PHYSICAL THERAPY | Age: 83
Discharge: HOME | End: 2018-07-12
Payer: MEDICARE

## 2018-07-12 PROCEDURE — 97530 THERAPEUTIC ACTIVITIES: CPT

## 2018-07-12 NOTE — PROGRESS NOTES
PT DAILY TREATMENT NOTE - [facility]     Patient Name: Naila Tillman  Date:2018  : 1925  [x]  Patient  Verified  Payor: [place] MEDICARE / Plan: [place] MEDICARE PART A & B / Product Type: Medicare /    In time:110  Out time:150  Total Treatment Time (min): 40  Total Timed Codes (min): 40  1:1 Treatment Time ( [address] only): 30    Visit #: 29 of 27 + 8    Treatment Area: Bilateral leg weakness [R29.898]  Unstable gait [R26.81]    SUBJECTIVE  Pain Level (0-10 scale): 0  Any medication changes, allergies to medications, adverse drug reactions, diagnosis change, or new procedure performed?: [x] No    [] Yes (see summary sheet for update)  Subjective functional status/changes:   [x] No changes reported  We got pulled over on our way to therapy      OBJECTIVE  15 min Therapeutic Exercise:  [x] See flow sheet :   Rationale: increase ROM, increase strength and improve coordination to improve the patients ability to return to prior level of physical activity.          25 min Therapeutic Activities:  [x]  See flow sheet :focus on static and dynamic  balance, transfer training with focus on WS, big amplitude movements, trunk Rotation, postural training, gait activities with focus on increased step length and improved WS   Rationale: increase ROM, increase strength and improve coordination  to improve the patients ability to return to prior level of physical activity.                With   [] TE   [] TA   [] neuro   [] other: Patient Education: [x] Review HEP    [] Progressed/Changed HEP based on:   [] positioning   [] body mechanics   [] transfers   [] heat/ice application    [] other:      Other Objective/Functional Measures:   Fear of falling  Wide HAL  Needs external assist with ambulation when not using AD  Ability to perform Rhomberg for 10 sec with SBA     Pain Level (0-10 scale) post treatment: 0    ASSESSMENT/Changes in Function: cues for upright increase stride and stay close to walker , less fatigue today Patient will continue to benefit from skilled PT services to modify and progress therapeutic interventions, address functional mobility deficits, address ROM deficits, address strength deficits, analyze and address soft tissue restrictions, analyze and cue movement patterns, analyze and modify body mechanics/ergonomics, assess and modify postural abnormalities and address imbalance/dizziness to attain remaining goals. [x]  See Plan of Care  [x]  See progress note/recertification  []  See Discharge Summary         Progress towards goals / Updated goals:  Short Term Goals: STG- To be accomplished in 3 week(s):  1.  Pt will be independent with HEP to encourage prophylaxis. Eval:HEP dispensed  Last PN: Poor compliance   Current: increased activity/walking at home, progressing      2. Pt will be able to stand > 15 minutes with rest break to wash dishes and complete ADLs with increased ease. Eval: 5-10 minutes  Last PN: 10 min before rest break  Current: able to walk around graduation with rest break around 15-20 minutes goal Met      Long Term Goals: LTG- To be accomplished in 6 week(s):  1.  Pt will demonstrate ability to complete 5x sit to stands in >20 seconds without UE support to demonstrate improved LE strength. .  Eval:33.24 seconds with bilateral UE assist   Last PN:  heavy reliance on UEs still, 24 seconds today   Current: 45 seconds due to increased fatigue today, heavily UE use - no progress       2.  Pt FGA score will improve >/= 4 points to indicate improved functional balance and decrease fall risk  Eval:13/30  Last PN: 13/30   Current: no change - recommend DC goal      3.  Pt bilateral LE strength will improve to >/= 4+/5 to allow pt to complete transfers with increased ease.   Eval: 4/5 bilateral LE  Last PN: 4/5 except 3+/5 hip flexion; performing sit to stand transfer with min use of hands,strength progressing slowly,   Current: 4+/5 HS and quads , 4/5 hip flexion, mod A floor<>stand transfer progressing       4.  New goal 3/27/18: Pt Pryor Balance score (32/56) will improve by 6 points to demonstrate improved transfer ability and decreased risk for falls  Last PN :35/56  Current: 29/56 -  regression possibly due to increased fatigue today, no progress    PLAN  [x]  Upgrade activities as tolerated     [x]  Continue plan of care for 4 weeks with focus on HEP, increased activity level and dynamic gait/balance activities  []  Update interventions per flow sheet       []  Discharge due to:_  []  Other:_      Ema Beth, PT 7/12/2018  5:27 PM    Future Appointments  Date Time Provider Niraj Light   7/17/2018 1:00 PM Ema Beth, PT [facility]   7/19/2018 1:00 PM Amanda Story, PT [facility]

## 2018-07-17 ENCOUNTER — APPOINTMENT (OUTPATIENT)
Dept: PHYSICAL THERAPY | Age: 83
End: 2018-07-17
Payer: MEDICARE

## 2018-07-19 ENCOUNTER — HOSPITAL ENCOUNTER (OUTPATIENT)
Dept: PHYSICAL THERAPY | Age: 83
Discharge: HOME | End: 2018-07-19
Payer: MEDICARE

## 2018-07-19 PROCEDURE — 97530 THERAPEUTIC ACTIVITIES: CPT

## 2018-07-19 PROCEDURE — 97110 THERAPEUTIC EXERCISES: CPT

## 2018-07-19 NOTE — PROGRESS NOTES
PT DAILY TREATMENT NOTE - [facility]     Patient Name: David Hancock  Date:2018  : 1925  [x]  Patient  Verified  Payor: [place] MEDICARE / Plan: [place] MEDICARE PART A & B / Product Type: Medicare /    In time:105  Out time:2  Total Treatment Time (min): 55  Total Timed Codes (min): 55  1:1 Treatment Time ( [address] only): 30    Visit #: 31 of 27 + 8    Treatment Area: Bilateral leg weakness [R29.898]  Unstable gait [R26.81]    SUBJECTIVE  Pain Level (0-10 scale): 0  Any medication changes, allergies to medications, adverse drug reactions, diagnosis change, or new procedure performed?: [x] No    [] Yes (see summary sheet for update)  Subjective functional status/changes:   [x] No changes reported  I am tired      OBJECTIVE  25 min Therapeutic Exercise:  [x] See flow sheet :   Rationale: increase ROM, increase strength and improve coordination to improve the patients ability to return to prior level of physical activity.          30 min Therapeutic Activities:  [x]  See flow sheet :focus on static and dynamic  balance, transfer training with focus on WS, big amplitude movements, trunk Rotation, postural training, gait activities with focus on increased step length and improved WS   Rationale: increase ROM, increase strength and improve coordination  to improve the patients ability to return to prior level of physical activity.                With   [] TE   [] TA   [] neuro   [] other: Patient Education: [x] Review HEP    [] Progressed/Changed HEP based on:   [] positioning   [] body mechanics   [] transfers   [] heat/ice application    [] other:      Other Objective/Functional Measures:   Fear of falling  Wide HAL  Needs external assist with ambulation when not using AD  Ability to perform Rhomberg for 10 sec with SBA     Pain Level (0-10 scale) post treatment: 0    ASSESSMENT/Changes in Function: cues for upright increase stride and stay close to walker , less fatigue today     Patient will continue to benefit from skilled PT services to modify and progress therapeutic interventions, address functional mobility deficits, address ROM deficits, address strength deficits, analyze and address soft tissue restrictions, analyze and cue movement patterns, analyze and modify body mechanics/ergonomics, assess and modify postural abnormalities and address imbalance/dizziness to attain remaining goals. [x]  See Plan of Care  [x]  See progress note/recertification  []  See Discharge Summary         Progress towards goals / Updated goals:  Short Term Goals: STG- To be accomplished in 3 week(s):  1.  Pt will be independent with HEP to encourage prophylaxis. Eval:HEP dispensed  Last PN: Poor compliance   Current: increased activity/walking at home, progressing      2. Pt will be able to stand > 15 minutes with rest break to wash dishes and complete ADLs with increased ease. Eval: 5-10 minutes  Last PN: 10 min before rest break  Current: able to walk around graduation with rest break around 15-20 minutes goal Met      Long Term Goals: LTG- To be accomplished in 6 week(s):  1.  Pt will demonstrate ability to complete 5x sit to stands in >20 seconds without UE support to demonstrate improved LE strength. .  Eval:33.24 seconds with bilateral UE assist   Last PN:  heavy reliance on UEs still, 24 seconds today   Current: 45 seconds due to increased fatigue today, heavily UE use - no progress       2.  Pt FGA score will improve >/= 4 points to indicate improved functional balance and decrease fall risk  Eval:13/30  Last PN: 13/30   Current: no change - recommend DC goal      3.  Pt bilateral LE strength will improve to >/= 4+/5 to allow pt to complete transfers with increased ease. Eval: 4/5 bilateral LE  Last PN: 4/5 except 3+/5 hip flexion; performing sit to stand transfer with min use of hands,strength progressing slowly,   Current: 4+/5 HS and quads , 4/5 hip flexion, mod A floor<>stand transfer progressing       4.  New goal 3/27/18: Pt Pryor Balance score (32/56) will improve by 6 points to demonstrate improved transfer ability and decreased risk for falls  Last PN :35/56  Current: 29/56 -  regression possibly due to increased fatigue today, no progress    PLAN  [x]  Upgrade activities as tolerated     [x]  Continue plan of care for 4 weeks with focus on HEP, increased activity level and dynamic gait/balance activities  []  Update interventions per flow sheet       []  Discharge due to:_  []  Other:_      Amanda Greer, PT 7/19/2018  5:27 PM    No future appointments.

## 2018-07-26 ENCOUNTER — HOSPITAL ENCOUNTER (OUTPATIENT)
Dept: PHYSICAL THERAPY | Age: 83
Discharge: HOME | End: 2018-07-26
Payer: MEDICARE

## 2018-07-26 PROCEDURE — 97116 GAIT TRAINING THERAPY: CPT

## 2018-07-26 PROCEDURE — G8978 MOBILITY CURRENT STATUS: HCPCS

## 2018-07-26 PROCEDURE — 97530 THERAPEUTIC ACTIVITIES: CPT

## 2018-07-26 PROCEDURE — G8979 MOBILITY GOAL STATUS: HCPCS

## 2018-07-26 NOTE — PROGRESS NOTES
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  Pt will be independent with HEP to encourage prophylaxis. Eval:HEP dispensed  Last PN: Poor compliance   Current: increased activity/walking at home, progressing      2. Pt will be able to stand > 15 minutes with rest break to wash dishes and complete ADLs with increased ease. Eval: 5-10 minutes  Last PN: 10 min before rest break  Current: able to walk around graduation with rest break around 15-20 minutes goal Met      Long Term Goals: LTG- To be accomplished in 6 week(s):  1.  Pt will demonstrate ability to complete 5x sit to stands in >20 seconds without UE support to demonstrate improved LE strength. .  Eval:33.24 seconds with bilateral UE assist   Last PN:  heavy reliance on UEs still, 24 seconds today   Current: 39 seconds due to increased fatigue today, heavily UE use - no progress       2.  Pt FGA score will improve >/= 4 points to indicate improved functional balance and decrease fall risk  Eval:  Last PN:    Current: no change - recommend DC goal      3.  Pt bilateral LE strength will improve to >/= 4+/5 to allow pt to complete transfers with increased ease. Eval: 4/5 bilateral LE  Last PN: Current: 4+/5 HS and quads , 4/5 hip flexion, mod A floor<>stand transfer   Current: Slight increase in strength in quads progressing      4. New goal 3/27/18: Pt Pryor Balance score (32/56) will improve by 6 points to demonstrate improved transfer ability and decreased risk for falls  Last PN :35/56  Current: 31/56 -  No progress  PLAN  Key functional changes: improved endurance with basic ADL      Problems/ barriers to goal attainment: deficit in strength and ambulatory balance     Problem List: decrease ROM, decrease strength, impaired gait/ balance, decrease flexibility/ joint mobility and decrease transfer abilities    Treatment Plan: Therapeutic exercise, Therapeutic activities, Neuromuscular re-education, Physical agent/modality, Gait/balance training and Patient education         Frequency / Duration: Patient to be seen 2 times per week for 1 weeks:    Assessment / Recommendations:Patient has been overall showing slow progress. She has made gains in strength, mobility and endurance with ambulation. I recommend continuation of therapy for 2 visits to establish independence with advanced HEP    G-Codes (GP)  Mobility  [id number] Current  CK= 40-59%   Goal  CK= 40-59%      The severity rating is based on clinical judgment and the FOTO score. Certification Period: 7/30/18-8/29/18    Carmelo Rogers, PT 7/26/2018 4:41 PM    ________________________________________________________________________  I certify that the above Therapy Services are being furnished while the patient is under my care. I agree with the treatment plan and certify that this therapy is necessary. [] I have read the above and request that my patient continue as recommended.   [] I have read the above report and request that my patient continue therapy with the following changes/special instructions: ______________________________________  [] I have read the above report and request that my patient be discharged from therapy    Physician's Signature:_______________________________Date:___________Time:__________    Please sign and return to   [facility] in [address]  [place] [address]  Phone: [phone]      Fax:  [phone]

## 2018-07-26 NOTE — PROGRESS NOTES
PT DAILY TREATMENT NOTE - [facility]     Patient Name: Diya Pimentel  Date:2018  : 1925  [x]  Patient  Verified  [place]or: [place] MED[place]ARE / Plan: [place] MEDICARE PART A & B / Product Type: Medicare /    In time:15:05  Out time: 15:55  Total Treatment Time (min): 50  Total Timed Codes (min): 50  1:1 Treatment Time ([facility] only): 50  Visit #: 32 of 27 + 8    Treatment Area: Bilateral leg weakness [R29.898]  Unstable gait [R26.81]    SUBJECTIVE  Pain Level (0-10 scale): 0  Any medication changes, allergies to medications, adverse drug reactions, diagnosis change, or new procedure performed?: [x] No    [] Yes (see summary sheet for update)  Subjective functional status/changes:   [x] No changes reported  \"I haven't been walking around as much as I should so I'm kind of slow today. \"      Bernadine segovia for safety  0 min Therapeutic Exercise:  [x] See flow sheet :   Rationale: increase ROM, increase strength and improve coordination to improve the patients ability to reach her highest level function possible.          40 min Therapeutic Activities:  [x]  See flow sheet :focus on static and dynamic  balance, transfer training with focus on WS, big amplitude movements; gait activities with focus on increased step length and upright posture   Rationale: increase posture and gait speed for household and community ambulation to improve the patients ability to reach her highest level function possible. 10 min Gait Training:  [x] See flow sheet : required VCs throughout session for upright posture   Rationale: increase ROM, increase strength and improve coordination to improve the patients ability to reach her highest level function possible.         With   [] TE   [x] TA   [] neuro   [] other: Patient Education: [x] Review HEP    [x] Progressed/Changed HEP based on:   [x] positioning   [x] body mechanics   [] transfers   [] heat/ice application    [x] other: increase active time at home and keep elbows at sides to prevent forward trunk lean during ambulation     Other Objective/Functional Measures:   Fear of falling  Wide HAL   Needs external assist with ambulation when not using AD and needs to use hands to rise from seated position  MMT:   Hip flexion: R: 4-/5, L: 3+/5  Hip abd: 4/5 bilaterally  Hip add: 3+ bilaterally  Knee extension: 5/5 bilaterally  Knee flexion: R: 5/5, L: 4/5  DF: R: 5/5, L: 4/5  Gait speed over 3m with rollater and SBA for safety: . 125 m/s indicating household ambulation speed  5x sit <> stand: 39s  Pryor Balance Test: 31/56 = MODERATE FALLS RISK      Pain Level (0-10 scale) post treatment: 0    ASSESSMENT/Changes in Function: Pt required verbal cues to increase upright positioning to increase stride and stay close to walker by bringing her elbows to her side. She was very fatigued today limiting her participation in therapy, but was able to complete Pryor Balance Test and 10m walk test to obtain her gait speed. Patient will continue to benefit from skilled PT services to modify and promote HEP preceding discharge. [x]  See Plan of Care  [x]  See progress note/recertification   []  See Discharge Summary         Progress towards goals / Updated goals:  Short Term Goals: STG- To be accomplished in 3 week(s):  1.  Pt will be independent with HEP to encourage prophylaxis. Eval:HEP dispensed  Last PN: Poor compliance   Current: increased activity/walking at home, progressing      2. Pt will be able to stand > 15 minutes with rest break to wash dishes and complete ADLs with increased ease. Eval: 5-10 minutes  Last PN: 10 min before rest break  Current: able to walk around graduation with rest break around 15-20 minutes goal Met      Long Term Goals: LTG- To be accomplished in 6 week(s):  1.  Pt will demonstrate ability to complete 5x sit to stands in >20 seconds without UE support to demonstrate improved LE strength. .  Eval:33.24 seconds with bilateral UE assist   Last PN: Aide Doyle reliance on UEs still, 24 seconds today   Current: 39 seconds due to increased fatigue today, heavily UE use - no progress       2.  Pt FGA score will improve >/= 4 points to indicate improved functional balance and decrease fall risk  Eval:13/30  Last PN: 13/30   Current: no change - recommend DC goal      3.  Pt bilateral LE strength will improve to >/= 4+/5 to allow pt to complete transfers with increased ease. Eval: 4/5 bilateral LE  Last PN: Current: 4+/5 HS and quads , 4/5 hip flexion, mod A floor<>stand transfer   Current: Slight increase in strength in quads progressing      4.  New goal 3/27/18: Pt Pryor Balance score (32/56) will improve by 6 points to demonstrate improved transfer ability and decreased risk for falls  Last PN :35/56  Current: 31/56 -  No progress  PLAN  [x]  Upgrade activities as tolerated     [x]  Continue plan of care for 2 visits for progression of independence with HEP to maintain gains made in PT  []  Update interventions per flow sheet       []  Discharge due to:_  []  Other:_      Rolo Hernandez, PT 7/26/2018  5:27 PM    Future Appointments  Date Time Provider Niraj Light   7/31/2018 1:30 PM Mai Gauthier, PT [facility]   8/2/2018 2:30 PM Amanda Dee, PT [facility]   8/6/2018 1:00 PM Mai Gauthier, PT [facility]

## 2018-07-31 ENCOUNTER — APPOINTMENT (OUTPATIENT)
Dept: PHYSICAL THERAPY | Age: 83
End: 2018-07-31
Payer: MEDICARE

## 2018-08-06 ENCOUNTER — HOSPITAL ENCOUNTER (OUTPATIENT)
Dept: PHYSICAL THERAPY | Age: 83
Discharge: HOME | End: 2018-08-06
Payer: MEDICARE

## 2018-08-06 PROCEDURE — 97116 GAIT TRAINING THERAPY: CPT

## 2018-08-06 PROCEDURE — 97530 THERAPEUTIC ACTIVITIES: CPT

## 2018-08-06 PROCEDURE — 97110 THERAPEUTIC EXERCISES: CPT

## 2018-08-06 NOTE — PROGRESS NOTES
PT DAILY TREATMENT NOTE - [facility]     Patient Name: Norma Mccoy  Date:2018  : 1925  [x]  Patient  Verified  Payor: [place] MEDICARE / Plan: [place] MEDICARE PART A & B / Product Type: Medicare /    In time:1  Out time: 2  Total Treatment Time (min): 60  Total Timed Codes (min): 50  1:1 Treatment Time ([facility] only): 60  Visit #: 34 of 27 + 8    Treatment Area: Bilateral leg weakness [R29.898]  Unstable gait [R26.81]    SUBJECTIVE  Pain Level (0-10 scale): 0  Any medication changes, allergies to medications, adverse drug reactions, diagnosis change, or new procedure performed?: [x] No    [] Yes (see summary sheet for update)  Subjective functional status/changes:   [x] No changes reported  'dialysis today. I am very tired. . That fall shook me up\"      OBJECTIVE   Gaitbelt donned for safety  10 min Therapeutic Exercise:  [x] See flow sheet :ROM   Rationale: increase ROM, increase strength and improve coordination to improve the patients ability to reach her highest level function possible.          35 min Therapeutic Activities:  [x]  See flow sheet :focus on static and dynamic  balance, transfer training with focus on WS, big amplitude movements; gait activities with focus on increased step length and upright posture   Rationale: increase posture and gait speed for household and community ambulation to improve the patients ability to reach her highest level function possible. 15 min Gait Training:  [x] See flow sheet : required VCs throughout session for upright posture   Rationale: increase ROM, increase strength and improve coordination to improve the patients ability to reach her highest level function possible.         With   [] TE   [x] TA   [] neuro   [] other: Patient Education: [x] Review HEP    [x] Progressed/Changed HEP based on:   [x] positioning   [x] body mechanics   [] transfers   [] heat/ice application    [x] other: increase active time at home and keep elbows at sides to prevent forward trunk lean during ambulation     Other Objective/Functional Measures:   Slowed gait , difficulty to maintain stance           Pain Level (0-10 scale) post treatment: 0    ASSESSMENT/Changes in Function: Pt required verbal cues to increase upright positioning to increase stride and stay close to walker by bringing her elbows to her side. She was very fatigued today limiting her participation in therapy, but was able to complete Pryor Balance Test and 10m walk test to obtain her gait speed. Patient will continue to benefit from skilled PT services to modify and promote HEP preceding discharge. By herself     [x]  See Plan of Care  [x]  See progress note/recertification   []  See Discharge Summary         Progress towards goals / Updated goals:  Short Term Goals: STG- To be accomplished in 3 week(s):  1.  Pt will be independent with HEP to encourage prophylaxis. Eval:HEP dispensed  Last PN: Poor compliance   Current: increased activity/walking at home, progressing      2. Pt will be able to stand > 15 minutes with rest break to wash dishes and complete ADLs with increased ease. Eval: 5-10 minutes  Last PN: 10 min before rest break  Current: able to walk around graduation with rest break around 15-20 minutes goal Met      Long Term Goals: LTG- To be accomplished in 6 week(s):  1.  Pt will demonstrate ability to complete 5x sit to stands in >20 seconds without UE support to demonstrate improved LE strength. .  Eval:33.24 seconds with bilateral UE assist   Last PN:  heavy reliance on UEs still, 24 seconds today   Current: 39 seconds due to increased fatigue today, heavily UE use - no progress       2.  Pt FGA score will improve >/= 4 points to indicate improved functional balance and decrease fall risk  Eval:13/30  Last PN: 13/30   Current: no change - recommend DC goal      3.  Pt bilateral LE strength will improve to >/= 4+/5 to allow pt to complete transfers with increased ease.   Eval: 4/5 bilateral LE  Last PN: Current: 4+/5 HS and quads , 4/5 hip flexion, mod A floor<>stand transfer   Current: Slight increase in strength in quads progressing      4.  New goal 3/27/18: Pt Pryor Balance score (32/56) will improve by 6 points to demonstrate improved transfer ability and decreased risk for falls  Last PN :35/56  Current: 31/56 -  No progress  PLAN  [x]  Upgrade activities as tolerated   , continue with current treatment for 1 additional session,   []  Update interventions per flow sheet       []  Discharge due to:_  []  Other:_      Alesia Story PT 8/6/2018  5:27 PM    Future Appointments  Date Time Provider Niraj Light   8/28/2018 2:00 PM Mukesh Quispe [facility]

## 2018-08-28 ENCOUNTER — APPOINTMENT (OUTPATIENT)
Dept: PHYSICAL THERAPY | Age: 83
End: 2018-08-28
Payer: MEDICARE

## 2018-09-06 NOTE — PROGRESS NOTES
[facility] in [place] [address] [place], [address] [place] [address] Phone: [phone]      Fax:  [phone] Discharge Summary Patient name: Tevin Moses Start of Care: 18 Referral source: Dragan Avery DO : 1925 Medical/Treatment Diagnosis: Bilateral leg weakness [R29.898] Unstable gait [R26.81] Onset Date:ongoing for years Prior Hospitalization: see medical history Provider#: [id number] Medications: Verified on Patient Summary List     
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 
Short Term Goals: STG- To be accomplished in 3 week(s): 1.  Pt will be independent with HEP to encourage prophylaxis. Eval:HEP dispensed Last PN: Poor compliance Current: increased activity/walking at home, progressing 
   
2. Pt will be able to stand > 15 minutes with rest break to wash dishes and complete ADLs with increased ease. Eval: 5-10 minutes Last PN: 10 min before rest break Current: able to walk around graduation with rest break around 15-20 minutes goal Met 
[address] [facility]- To be accomplished in 6 week(s): 1.  Pt will demonstrate ability to complete 5x sit to stands in >20 seconds without UE support to demonstrate improved LE strength. Mliss Cary Eval:33.24 seconds with bilateral UE assist  
Last PN:  heavy reliance on UEs still, 24 seconds today Current: 39 seconds due to increased fatigue today, heavily UE use - no progress  
   
2.  Pt FGA score will improve >/= 4 points to indicate improved functional balance and decrease fall risk Eval: Last PN:  Current: no change - recommend DC goal 
   
 3.  Pt bilateral LE strength will improve to >/= 4+/5 to allow pt to complete transfers with increased ease. Eval: 4/5 bilateral LE Last PN: Current: 4+/5 HS and quads , 4/5 hip flexion, mod A floor<>stand transfer  
Current: Slight increase in strength in quads progressing 
   
4. New goal 3/27/18: Pt Pryor Balance score (32/56) will improve by 6 points to demonstrate improved transfer ability and decreased risk for falls Last PN :35/56 Current: 31/56 -  No progress G-Codes (GP) Mobility  Goal  CK= 40-59%  D/C  CK= 40-59% The severity rating is based on clinical judgment and the FOTO score. Assessment/ Summary of Care: Mrs. Marii Espino will be staying in [place] for an extended period. Her  called our office and is requesting discharge at this time. Overall progress has been slow. She has met 1 of 6 goals. Please refer to above details. RECOMMENDATIONS: 
[x]Discontinue therapy: []Patient has reached or is progressing toward set goals []Patient is non-compliant or has abdicated 
    []Due to lack of appreciable progress towards set goals Mai Gauthier, PT 9/6/2018 8:03 AM

## 2020-01-28 ENCOUNTER — APPOINTMENT (OUTPATIENT)
Dept: PHYSICAL THERAPY | Age: 85
End: 2020-01-28

## 2020-01-30 ENCOUNTER — HOSPITAL ENCOUNTER (OUTPATIENT)
Dept: PHYSICAL THERAPY | Age: 85
Discharge: HOME | End: 2020-01-30
Payer: MEDICARE

## 2020-01-30 PROCEDURE — 97161 PT EVAL LOW COMPLEX 20 MIN: CPT

## 2020-01-30 PROCEDURE — 97110 THERAPEUTIC EXERCISES: CPT

## 2020-01-30 NOTE — PROGRESS NOTES
[facility] at [address]: [phone]   Fax: [phone]  PLAN OF CARE / [address] [place] [address] FOR PHYSICAL THERAPY SERVICES  Patient Name: Vivian Alexis : 1925   Medical   Diagnosis: Other abnormalities of gait and mobility [R26.89]  Right leg weakness [R29.898]  Left leg weakness [R29.898]  Right shoulder pain [M25.511] Treatment Diagnosis: Other abnormalities of Gait  Left Leg Weakness  Right Leg Weakness  Right Shoulder Pain   Onset Date: Chronic     Referral Source: Mai Garcia [facility]): 2020   Prior Hospitalization: See medical history Provider #: [id number]   Prior Level of Function: Sedentary, Ambulation with Rollator   Comorbidities:  Hearing impairment, Hx of colon cancer   Medications: Verified on Patient Summary List   The Plan of Care and following information is based on the information from the initial evaluation.   ===========================================================================================  Assessment / key information:  Vivian Alexis is a 80 y.o. female presents with  with c/o extended Hx of decreased balance and unsteadiness of gait resulting in reduce activity tolerance and participation. She also has c/o right shoulder pain for the last year that she attributes to supporting herself during ambulation and transferring. She has reduced strength in bilateral lower extremities left more than right. She is a high risk for falls. She requires minimal assistance for sit to supine and bed mobility. Patient ambulates with modified assistance utilizing a rollator demonstrating an shuffling gait pattern. She decreases her ivet when negotiating turns. Patient demonstrates reduced safety awareness with transfers. Patient presents with signs and symptom of unsteadiness of gait accompanied by generalized weakness.  Patient will continue to benefit from skilled PT services to modify and progress therapeutic interventions, address functional mobility deficits, address ROM deficits, address strength deficits, analyze and cue movement patterns, analyze and modify body mechanics/ergonomics, assess and modify postural abnormalities, address imbalance/dizziness and instruct in home and community integration to attain remaining goals. .    ===========================================================================================  Eval Complexity: History MEDIUM  Complexity : 1-2 comorbidities / personal factors will impact the outcome/ POC ;  Examination  LOW Complexity : 1-2 Standardized tests and measures addressing body structure, function, activity limitation and / or participation in recreation ; Presentation LOW Complexity : Stable, uncomplicated ;  Decision Making MEDIUM Complexity : FOTO score of 26-74; Overall Complexity LOW   Problem List: pain affecting function, decrease ROM, decrease strength, impaired gait/ balance, decrease ADL/ functional abilitiies, decrease activity tolerance, decrease flexibility/ joint mobility and decrease transfer abilities   Treatment Plan may include any combination of the following: Therapeutic exercise, Therapeutic activities, Neuromuscular re-education, Physical agent/modality, Gait/balance training, Manual therapy, Patient education, Self Care training, Functional mobility training, Home safety training and Stair training  Patient / Family readiness to learn indicated by: asking questions, trying to perform skills and interest  Persons(s) to be included in education: patient (P)  Barriers to Learning/Limitations: no  Measures taken if barriers to learning:   Patient Goal (s): Improve Strength and Balance   Patient self reported health status: good  Rehabilitation Potential: good  Goals:  Short Term Goals:  To be accomplished in 4 weeks:   Patient will report compliance with HEP at least 1x/day to aid in rehabilitation program.   Status at IE: Reviewed safe transfers with Rollator   Current: Same as IE     Patient will decrease TUG by 8 seconds to display MCID and progression to decreased falls risk. Status at IE: 28 seconds   Current: Same as IE       Long Term Goals: To be accomplished in 8 weeks:   Patient will increase strength to 4+/5 throughout B LEs to aid in return recreational activities and ADLs. Status at IE: 3+/5   Current: Same as IE     Patient will improve Tinetti score to 22 to demonstrate decreased risk for falls. Status at IE: 16   Current: Same as IE     Patient will perform 6 sit to stands in 30 seconds without UEA to promote safety with transfers. Status at IE:6 with UEA   Current:Same as IE     Patient will improve FOTO to 67 points overall to demonstrate improvement in functional ability. Status at IE:FOTO score = 44 (an established functional score where 100 = no disability)   Current: Same as IE    Frequency / Duration:   Patient to be seen 3  times per week for 12  weeks:  Patient / Caregiver education and instruction: self care and exercises      . Therapist Signature: Benitez Arvizu PT, DPT Date: 4/82/3677   Certification Period: 1/30/2020 - 4/29/2020 Time: 12:48 PM   ===========================================================================================  I certify that the above Physical Therapy Services are being furnished while the patient is under my care. I agree with the treatment plan and certify that this therapy is necessary. Physician Signature:        Date:       Time:     Please sign and return to [facility] or you may fax the signed copy to [phone]. Thank you.

## 2020-01-30 NOTE — PROGRESS NOTES
PT DAILY TREATMENT NOTE/NEURO EVAL 10-18    Patient Name: Basilio Bocanegra  Date:2020  : 1925  [x]  Patient  Verified  Payor: Nohemi Bhardwaj / Plan: [place] MED[place] & B / Product Type: Medicare /    In time:1100  Out time:1200  Total Treatment Time (min): 60  Visit #: 1 of 24    Medicare/BCBS Only   Total Timed Codes (min):  25 1:1 Treatment Time:  60     Treatment Area: Other abnormalities of gait and mobility [R26.89]  Right leg weakness [R29.898]  Left leg weakness [R29.898]    SUBJECTIVE  Pain Level (0-10 scale): 0  []constant [x]intermittent []improving []worsening []no change since onset    Any medication changes, allergies to medications, adverse drug reactions, diagnosis change, or new procedure performed?: [x] No    [] Yes (see summary sheet for update)  Subjective functional status/changes:     Patient presents with c/o extended Hx of decreased balance and unsteadiness of gait resulting in reduce activity tolerance and participation. She also has c/o right shoulder pain for the last year that she attributes to supporting herself during ambulation and transferring. Patient describes pain as soreness and ache. Patient reports a history of falls but has not fallen in the last 3 months. Denies numbness/tingling. Denies popping/clicking. Aggravating factors:lifting, reaching. Alleviating factors: rest, moving arm. Denies red flags: SOB, chest pain, dizziness/lightheadedness, blurred/double vision, HA, chills/fevers, night sweats, change in bowel/bladder control, abdominal pain, difficulty swallowing, slurred speech, unexplained weight gain/loss, nausea, vomiting. PMHx: Hearing impairment, Hx of colon cancer. Surgical Hx: cancer excision. Social Hx: Lives with  in a single story home, alcohol, work status: retired. PLOF: limited with ambulation, sedentary. CLOF: avoids activity due to fear of falling.   Diagnostic Imaging: None per patient      OBJECTIVE/EXAMINATION      35 min [x]Eval []Re-Eval       25 min Therapeutic Exercise:  [x]  See flow sheet :   Rationale: improve strength, improve balance to improve the patients ability to complete ADLs and decrease falls risk        With   [x] TE   [] TA   [] neuro   [] other: Patient Education: [x] Review HEP    [] Progressed/Changed HEP based on:   [] positioning   [] body mechanics   [] transfers   [] heat/ice application    [] other:        Physical Therapy Evaluation  Neurologic    Posture: [] Poor    [x] Fair    [] Good    Describe: Forward head and rounded shoulders  Gait: [] Normal    [x] Abnormal    Device:   Rollator  Describe: Patient ambulates with modified assistance utilizing a rollator demonstrating an shuffling gait pattern. She decreases her ivet when negotiating turns.      ROM:                             AROM     Shoulder Left Right   Flex WNL WNL   Ext WNL WNL   ABD WNL WNL   ER WNL WNL   IR WNL WNL         ROM/Strength        AROM             Strength (1-5)  Hip Left Right Left Right   Flexion [facility] [place] WFL 4- 4-   Extension [facility] [place] WFL 3 3   Abduction [facility] [place] WFL 3+ 3+   Adduction [facility] [place] WFL 4- 4-   Knee Left Right Left Right   Extension [facility] [place] [facility] [place] 4- 4   [facility] 3+ 4-   Ankle WFL WFL     Plantar Flexion WFL WFL 3+ 3+   Dorsiflexion WFL WFL 4 4   Inversion   4- 4-   Eversion    3+ 3+       Strength (MMT):  Shoulder L (1-5) R (1-5)   Shoulder Flexion 4- 4-   Shoulder Ext 4- 4-   Shoulder ABD 4- 4-   Shoulder ADD 4- 4-   Shoulder IR 4- 4-   Shoulder ER 3+ 4-                                               Motor Control: WNLs    Sensation: WNLs    Balance/ Equilibrium:                  Sitting Balance: Static:  [x] Good    [] Fair    [] Poor     Dynamic:   [] Good    [x] Fair    [] Poor        Standing Balance: Static:   [] Good    [] Fair    [x] Poor     Dynamic:   [] Good    [] Fair    [x] Poor  Other:       Impaired Judgement: [] Y    [x] N      Impaired Vision:  [] Y    [x] N      Safety Awareness Deficits  [x] Y    [] N      Impaired Hearing  [x] Y    [] N      Able to Express Needs [x] Y    [] N    Optional Tests:   Tinetti (28pt scale): 13    Other test /comments:  TU seconds  30 seconds Sit<>stand: 6 but required UEA  Required minimal assist for bed mobility     Pain Level (0-10 scale) post treatment: 0    ASSESSMENT/Changes in Function:     Patient presents with c/o extended Hx of decreased balance and unsteadiness of gait resulting in reduce activity tolerance and participation. She also has c/o right shoulder pain for the last year that she attributes to supporting herself during ambulation and transferring. She has reduced strength in bilateral lower extremities left more than right. She is a high risk for falls. She requires minimal assistance for sit to supine and bed mobility. Patient ambulates with modified assistance utilizing a rollator demonstrating an shuffling gait pattern. She decreases her ivet when negotiating turns. Patient demonstrates reduced safety awareness with transfers. Patient presents with signs and symptom of unsteadiness of gait accompanied by generalized weakness. Patient will continue to benefit from skilled PT services to modify and progress therapeutic interventions, address functional mobility deficits, address ROM deficits, address strength deficits, analyze and cue movement patterns, analyze and modify body mechanics/ergonomics, assess and modify postural abnormalities, address imbalance/dizziness and instruct in home and community integration to attain remaining goals.      [x]  See Plan of Care  []  See progress note/recertification  []  See Discharge Summary         Progress towards goals / Updated goals:  See POC    PLAN  []  Upgrade activities as tolerated     [x]  Continue plan of care  []  Update interventions per flow sheet       []  Discharge due to:_  []  Other:_      Devon Prieto, PT, DPT 2020  11:05 AM

## 2020-02-04 ENCOUNTER — HOSPITAL ENCOUNTER (OUTPATIENT)
Dept: PHYSICAL THERAPY | Age: 85
Discharge: HOME | End: 2020-02-04
Payer: MEDICARE

## 2020-02-04 PROCEDURE — 97110 THERAPEUTIC EXERCISES: CPT

## 2020-02-04 NOTE — PROGRESS NOTES
PT DAILY TREATMENT NOTE - [facility]     Patient Name: Eliud Brothesr  Date:2020  : 1925  [x]  Patient  Verified  Payor: Suellen Barajas / Plan: [place] MEDICARE PART A & B / Product Type: Medicare /    In time:300  Out time:330  Total Treatment Time (min): 30  Total Timed Codes (min): 30   1:1 Treatment Time ( [address] only): 30   Visit #: 2 of 24    Treatment Area: Other abnormalities of gait and mobility [R26.89]  Right leg weakness [R29.898]  Left leg weakness [R29.898]  Right shoulder pain [M25.511]    SUBJECTIVE  Pain Level (0-10 scale): 0  Any medication changes, allergies to medications, adverse drug reactions, diagnosis change, or new procedure performed?: [x] No    [] Yes (see summary sheet for update)  Subjective functional status/changes:   [] No changes reported  Patient reports no new changes since initial evaluation. OBJECTIVE    30 min Therapeutic Exercise:  [x] See flow sheet :   Rationale: increase ROM, increase strength and decrease pain to improve the patients ability to complete ADLs          With   [x] TE   [] TA   [] neuro   [] other: Patient Education: [x] Review HEP    [] Progressed/Changed HEP based on:   [] positioning   [] body mechanics   [] transfers   [] heat/ice application    [] other:      Other Objective/Functional Measures: Na     Pain Level (0-10 scale) post treatment: 0    ASSESSMENT/Changes in Function: Patient responds well to treatment session. Patient required cues for safety when using walker during transfers. Patient  required CGA during sit to stand for safety. She was challenged with exercises prescribed.  No adverse effects were noted from today's treatment session      Patient will continue to benefit from skilled PT services to modify and progress therapeutic interventions, address functional mobility deficits, address ROM deficits, address strength deficits, analyze and address soft tissue restrictions, analyze and cue movement patterns, analyze and modify body mechanics/ergonomics, assess and modify postural abnormalities, instruct in home and community integration to attain remaining goals. []  See Plan of Care  []  See progress note/recertification  []  See Discharge Summary         Progress towards goals / Updated goals:  Short Term Goals: To be accomplished in 4 weeks:                 Patient will report compliance with HEP at least 1x/day to aid in rehabilitation program.                 Status at IE: Reviewed safe transfers with Rollator                 Current: In-progress reviewed walker safety 2/4/2020                    Patient will decrease TUG by 8 seconds to display MCID and progression to decreased falls risk. Status at IE: 28 seconds                 Current: Same as IE    [address] be accomplished in 8 weeks:                 Patient will increase strength to 4+/5 throughout B LEs to aid in return recreational activities and ADLs. Status at IE: 3+/5                 Current: Same as IE                    Patient will improve Tinetti score to 22 to demonstrate decreased risk for falls. Status at IE: 16                 Current: Same as IE                    Patient will perform 6 sit to stands in 30 seconds without UEA to promote safety with transfers. Status at IE:6 with UEA                 Current:Same as IE                    Patient will improve FOTO to 67 points overall to demonstrate improvement in functional ability.                  Status at IE:FOTO score = 44 (an established functional score where 100 = no disability)                 Current: Same as IE    PLAN  []  Upgrade activities as tolerated     [x]  Continue plan of care  []  Update interventions per flow sheet       []  Discharge due to:_  []  Other:_      Buchanan Garry PT, DPT 2/4/2020  2:58 PM    Future Appointments   Date Time Provider Niraj Light   2/4/2020  3:00 PM Pastor Roca PT [facility] 2/6/2020  1:00 PM Jazmyne Diamond, PT, DPT [facility]   2/11/2020  4:00 PM Rhea Capone, PT [facility]   2/13/2020  3:00 PM Rhea Capone, PT [facility]   2/18/2020  2:00 PM Rhea Capone, PT [facility]   2/20/2020  2:00 PM Rhea Capone, PT [facility]   2/25/2020  2:00 PM Rhea Capone, PT [facility]   2/27/2020  2:00 PM Rhea Capone, PT [facility]   3/3/2020  2:00 PM Rhea Capone, PT [facility]   3/5/2020  2:00 PM Rhea Capone, PT [facility]   3/10/2020  2:00 PM Rhea Capone, PT [facility]   3/12/2020  2:00 PM Rhea Capone, PT [facility]

## 2020-02-06 ENCOUNTER — HOSPITAL ENCOUNTER (OUTPATIENT)
Dept: PHYSICAL THERAPY | Age: 85
Discharge: HOME | End: 2020-02-06
Payer: MEDICARE

## 2020-02-06 PROCEDURE — 97110 THERAPEUTIC EXERCISES: CPT | Performed by: PHYSICAL THERAPIST

## 2020-02-06 NOTE — PROGRESS NOTES
PT DAILY TREATMENT NOTE    Patient Name: Emil Pope  Date:2020  : 1925  [x]  Patient  Verified  Payor: Gilberto Signs / Plan: [place] MEDICARE PART A & B / Product Type: Medicare /    In time:12:00  Out time:12:42  Total Treatment Time (min): 42  Total Timed Codes (min): 42  1:1 Treatment Time ( [address] only): 42   Visit #: 3 of 24    Treatment Area: Other abnormalities of gait and mobility [R26.89]  Right leg weakness [R29.898]  Left leg weakness [R29.898]  Right shoulder pain [M25.511]    SUBJECTIVE  Pain Level (0-10 scale): 0  Any medication changes, allergies to medications, adverse drug reactions, diagnosis change, or new procedure performed?: [x] No    [] Yes (see summary sheet for update)  Subjective functional status/changes:   [] No changes reported  Feels okay. No new issues. OBJECTIVE    42 min Therapeutic Exercise:  [x] See flow sheet :   Rationale: increase ROM, increase strength and decrease pain to improve the patients ability to complete ADLs    With   [] TE   [] TA   [] neuro   [] other: Patient Education: [x] Review HEP    [] Progressed/Changed HEP based on:   [] positioning   [] body mechanics   [] transfers   [] heat/ice application    [] other:      Other Objective/Functional Measures: NA     Pain Level (0-10 scale) post treatment: 0    ASSESSMENT/Changes in Function: Patient responds well to treatment session. Patient has minimal difficulty with exercises as prescribed. Will progress as tolerated. . No adverse effects were noted from today's treatment session       Patient will continue to benefit from skilled PT services to modify and progress therapeutic interventions, address functional mobility deficits, address ROM deficits, address strength deficits, analyze and address soft tissue restrictions, analyze and cue movement patterns, analyze and modify body mechanics/ergonomics, assess and modify postural abnormalities, address imbalance/dizziness and instruct in home and community integration to attain remaining goals      []  See Plan of Care  []  See progress note/recertification  []  See Discharge Summary         Progress towards goals / Updated goals:  Short Term Goals: To be accomplished in 4 weeks:                 RVSURND will report compliance with HEP at least 1x/day to aid in rehabilitation program.                 Status at IE: Reviewed safe transfers with Rollator                 Current: In-progress reviewed walker safety 2/4/2020                    Patient will decrease TUG by 8 seconds to display MCID and progression to decreased falls risk.                 Status at IE: 28 seconds                 Current: Same as IE     Long Term Goals: To be accomplished in 8 weeks:                 BFGSAXL will increase strength to 4+/5 throughout B LEs to aid in return recreational activities and ADLs.                Status at IE: 3+/5                 Current: Same as IE                    Patient will improve Tinetti score to 22 to demonstrate decreased risk for falls.                 Status at IE: 16                 Current: Same as IE                    Patient will perform 6 sit to stands in 30 seconds without UEA to promote safety with transfers.                 Status at IE:6 with UEA                 Current:Same as IE                    Patient will improve FOTO to 67 points overall to demonstrate improvement in functional ability.                  Status at IE:FOTO score = 44 (an established functional score where 100 = no disability)                 Current: Same as IE    PLAN  []  Upgrade activities as tolerated     [x]  Continue plan of care  []  Update interventions per flow sheet       []  Discharge due to:_  []  Other:_      Fausto Montalvo, PT, DPT 2/6/2020  12:15 PM    Future Appointments   Date Time Provider Niraj Light   2/6/2020  1:00 PM Sukhjinder Maxwell, PT, DPT [facility]   2/11/2020  1:00 PM Hipolito Kawasaki, PT [facility]   2/13/2020  1:30 PM Tripp Jenkins C, PT [facility]   2/18/2020  1:00 PM Hepler Ubaldo, PT [facility]   2/20/2020  1:30 PM Hepler Ubaldo, PT [facility]   2/25/2020  1:00 PM Eric Ubaldo, PT [facility]   2/27/2020  1:30 PM Eric Ubaldo, PT [facility]   3/3/2020  1:00 PM Hepler Ubaldo, PT [facility]   3/5/2020  1:00 PM Hepler Ubaldo, PT [facility]   3/10/2020  1:00 PM Eric Ubaldo, PT [facility]   3/12/2020  1:00 PM Hepler Ubaldo, PT [facility]

## 2020-02-11 ENCOUNTER — HOSPITAL ENCOUNTER (OUTPATIENT)
Dept: PHYSICAL THERAPY | Age: 85
Discharge: HOME | End: 2020-02-11
Payer: MEDICARE

## 2020-02-11 PROCEDURE — 97110 THERAPEUTIC EXERCISES: CPT

## 2020-02-11 NOTE — PROGRESS NOTES
PT DAILY TREATMENT NOTE - [facility]     Patient Name: Wesley Domingo  Date:2020  : 1925  [x]  Patient  Verified  Payor: Lyn Linker / Plan: [place] MEDICARE PART A & B / Product Type: Medicare /    In time:112  Out time:135  Total Treatment Time (min): 23  Total Timed Codes (min): 23   1:1 Treatment Time ( [address] only): 23   Visit #: 4 of 24    Treatment Area: Other abnormalities of gait and mobility [R26.89]  Right leg weakness [R29.898]  Left leg weakness [R29.898]  Right shoulder pain [M25.511]    SUBJECTIVE  Pain Level (0-10 scale): 0  Any medication changes, allergies to medications, adverse drug reactions, diagnosis change, or new procedure performed?: [x] No    [] Yes (see summary sheet for update)  Subjective functional status/changes:   [] No changes reported    Patient reports that she did not want to come out in the rain as she feels that it is unsafe. OBJECTIVE    23 min Therapeutic Exercise:  [x] See flow sheet :   Rationale: increase ROM, increase strength and decrease pain to improve the patients ability to complete ADLs          With   [x] TE   [] TA   [] neuro   [] other: Patient Education: [x] Review HEP    [] Progressed/Changed HEP based on:   [] positioning   [] body mechanics   [] transfers   [] heat/ice application    [] other:      Other Objective/Functional Measures: NA     Pain Level (0-10 scale) post treatment: 0    ASSESSMENT/Changes in Function: Patient responds well to treatment session. Advised patient that if she feels unsafe to travel due to weather to call and reschedule her appointment. Progressed patient from seated activities to weight bearing in parallel bars. Provided close supervision for safety with weight bearing exercise. She was challenged with exercises prescribed.  No adverse effects were noted from today's treatment session    Patient will continue to benefit from skilled PT services to modify and progress therapeutic interventions, address functional mobility deficits, address ROM deficits, address strength deficits, analyze and address soft tissue restrictions, analyze and cue movement patterns, analyze and modify body mechanics/ergonomics, assess and modify postural abnormalities, address imbalance and instruct in home and community integration to attain remaining goals. []  See Plan of Care  []  See progress note/recertification  []  See Discharge Summary         Progress towards goals / Updated goals:  Short Term Goals: To be accomplished in 4 weeks:                 FVAPWEY will report compliance with HEP at least 1x/day to aid in rehabilitation program.                 Status at IE: Reviewed safe transfers with Rollator                 Current: In-progress reviewed walker safety 2/4/2020                    Patient will decrease TUG by 8 seconds to display MCID and progression to decreased falls risk.                 Status at IE: 28 seconds                 Current: Same as IE     Long Term Goals: To be accomplished in 8 weeks:                 Patient will increase strength to 4+/5 throughout B LEs to aid in return recreational activities and ADLs.                Status at IE: 3+/5                 Current: Same as IE                    Patient will improve Tinetti score to 22 to demonstrate decreased risk for falls.                 Status at IE: 16                 Current: Same as IE                    Patient will perform 6 sit to stands in 30 seconds without UEA to promote safety with transfers.                 Status at IE:6 with UEA                 Current:Same as IE                    Patient will improve FOTO to 67 points overall to demonstrate improvement in functional ability.                  Status at IE:FOTO score = 44 (an established functional score where 100 = no disability)                 Current: Same as IE    PLAN  []  Upgrade activities as tolerated     [x]  Continue plan of care  []  Update interventions per flow sheet       [] Discharge due to:_  []  Other:_      Kerry Vann, PT, DPT 2/11/2020  1:07 PM    Future Appointments   Date Time Provider Niraj Nadege   2/13/2020  1:30 PM Sonda Monas, PT [facility]   2/18/2020  1:00 PM Sonda Monas, PT [facility]   2/20/2020  1:30 PM Sonda Monas, PT [facility]   2/25/2020  1:00 PM Sonda Monas, PT [facility]   2/27/2020  1:30 PM Sonda Monas, PT [facility]   3/3/2020  1:00 PM Sonda Monas, PT [facility]   3/5/2020  1:00 PM Sonda Monas, PT [facility]   3/10/2020  1:00 PM Sonda Monas, PT [facility]   3/12/2020  1:00 PM Sonda Monas, PT [facility]

## 2020-02-13 ENCOUNTER — HOSPITAL ENCOUNTER (OUTPATIENT)
Dept: PHYSICAL THERAPY | Age: 85
Discharge: HOME | End: 2020-02-13
Payer: MEDICARE

## 2020-02-13 PROCEDURE — 97110 THERAPEUTIC EXERCISES: CPT

## 2020-02-13 NOTE — PROGRESS NOTES
PT DAILY TREATMENT NOTE - [facility]     Patient Name: Harjit Cuadra  Date:2020  : 1925  [x]  Patient  Verified  Payor: Medina Reynolds / Plan: [place] / Product Type: Medicare /    In time:135  Out time:200  Total Treatment Time (min): 25  Total Timed Codes (min): 25   1:1 Treatment Time ( [address] only): 25   Visit #: 4 of 24    Treatment Area: Other abnormalities of gait and mobility [R26.89]  Right leg weakness [R29.898]  Left leg weakness [R29.898]  Right shoulder pain [M25.511]    SUBJECTIVE  Pain Level (0-10 scale): 0  Any medication changes, allergies to medications, adverse drug reactions, diagnosis change, or new procedure performed?: [x] No    [] Yes (see summary sheet for update)  Subjective functional status/changes:   [] No changes reported    Patient reports that she was sore after last visit but expects to be. OBJECTIVE    25 min Therapeutic Exercise:  [x] See flow sheet :   Rationale: increase ROM, increase strength and decrease pain to improve the patients ability to complete ADLs          With   [x] TE   [] TA   [] neuro   [] other: Patient Education: [x] Review HEP    [] Progressed/Changed HEP based on:   [] positioning   [] body mechanics   [] transfers   [] heat/ice application    [] other:      Other Objective/Functional Measures: NA     Pain Level (0-10 scale) post treatment: 0    ASSESSMENT/Changes in Function: Patient responds well to treatment session as she demonstrated improved tolerance to weight bearing exercise. Provided close supervision for safety.  No adverse effects were noted from today's treatment session    Patient will continue to benefit from skilled PT services to modify and progress therapeutic interventions, address functional mobility deficits, address ROM deficits, address strength deficits, analyze and address soft tissue restrictions, analyze and cue movement patterns, analyze and modify body mechanics/ergonomics, assess and modify postural abnormalities, address imbalance and instruct in home and community integration to attain remaining goals. []  See Plan of Care  []  See progress note/recertification  []  See Discharge Summary         Progress towards goals / Updated goals:  Short Term Goals: To be accomplished in 4 weeks:                 CHELSEA will report compliance with HEP at least 1x/day to aid in rehabilitation program.                 Status at IE: Reviewed safe transfers with Rollator                 Current: In-progress reviewed walker safety 2/4/2020                    Patient will decrease TUG by 8 seconds to display MCID and progression to decreased falls risk.                 Status at IE: 28 seconds                 Current: Same as IE     Long Term Goals: To be accomplished in 8 weeks:                 Patient will increase strength to 4+/5 throughout B LEs to aid in return recreational activities and ADLs.                Status at IE: 3+/5                 Current: Same as IE                    Patient will improve Tinetti score to 22 to demonstrate decreased risk for falls.                 Status at IE: 16                 Current: Same as IE                    FIUMKML will perform 6 sit to stands in 30 seconds without UEA to promote safety with transfers.                 Status at IE:6 with UEA                 Current: In-progress performed 2x10 sit to stands with UEA 2/13/2020                    Patient will improve FOTO to 67 points overall to demonstrate improvement in functional ability.                  Status at IE:FOTO score = 44 (an established functional score where 100 = no disability)                 Current: Same as IE    PLAN  []  Upgrade activities as tolerated     [x]  Continue plan of care  []  Update interventions per flow sheet       []  Discharge due to:_  []  Other:_      Matty Pop, PT, DPT 2/13/2020  1:30 PM    Future Appointments   Date Time Provider Niraj Light   2/18/2020  1:00 PM Parmjit Comment, Nigel Underwood, PT [facility]   2/20/2020  1:30 PM Sabi Jest, PT [facility]   2/25/2020  1:00 PM Sabi Jest, PT [facility]   2/27/2020  1:30 PM Sabi Jest, PT [facility]   3/3/2020  1:00 PM Sabi Jest, PT [facility]   3/5/2020  1:00 PM Sabi Jest, PT [facility]   3/10/2020  1:00 PM Sabi Jest, PT [facility]   3/12/2020  1:00 PM Sabi Jest, PT [facility]

## 2020-02-18 ENCOUNTER — HOSPITAL ENCOUNTER (OUTPATIENT)
Dept: PHYSICAL THERAPY | Age: 85
Discharge: HOME | End: 2020-02-18
Payer: MEDICARE

## 2020-02-18 PROCEDURE — 97110 THERAPEUTIC EXERCISES: CPT

## 2020-02-18 NOTE — PROGRESS NOTES
PT DAILY TREATMENT NOTE - [facility]     Patient Name: Chula Barreto  Date:2020  : 1925  [x]  Patient  Verified  Payor: Alireza Solders / Plan: [place] MEDICARE PART A & B / Product Type: Medicare /    In time:125  Out time:150  Total Treatment Time (min): 25  Total Timed Codes (min): 25   1:1 Treatment Time ( [address] only): 25   Visit #: 5 of 24    Treatment Area: Other abnormalities of gait and mobility [R26.89]  Right leg weakness [R29.898]  Left leg weakness [R29.898]  Right shoulder pain [M25.511]    SUBJECTIVE  Pain Level (0-10 scale): 0  Any medication changes, allergies to medications, adverse drug reactions, diagnosis change, or new procedure performed?: [x] No    [] Yes (see summary sheet for update)  Subjective functional status/changes:   [] No changes reported  Patient apologized for being late for her appointment. Patient reports that she is doing well and new issues. OBJECTIVE    25 min Therapeutic Exercise:  [x] See flow sheet :   Rationale: increase ROM, increase strength and decrease pain to improve the patients ability to complete ADLs          With   [] TE   [] TA   [] neuro   [] other: Patient Education: [x] Review HEP    [] Progressed/Changed HEP based on:   [] positioning   [] body mechanics   [] transfers   [] heat/ice application    [] other:      Other Objective/Functional Measures: NA     Pain Level (0-10 scale) post treatment: 0    ASSESSMENT/Changes in Function: Patient responds well to treatment session. Patient demonstrated poor recall of walker safety. Provided close supervision and occasional CGA with all exercise activities for safety.  No adverse effects were noted from today's treatment session    Patient will continue to benefit from skilled PT services to modify and progress therapeutic interventions, address functional mobility deficits, address ROM deficits, address strength deficits, analyze and address soft tissue restrictions, analyze and cue movement patterns, analyze and modify body mechanics/ergonomics, assess and modify postural abnormalities,  instruct in home and community integration to attain remaining goals. []  See Plan of Care  []  See progress note/recertification  []  See Discharge Summary         Progress towards goals / Updated goals:  Short Term Goals: To be accomplished in 4 weeks:                 NITZA will report compliance with HEP at least 1x/day to aid in rehabilitation program.                 Status at IE: Reviewed safe transfers with Rollator                 Current: In-progress reviewed walker safety 2/4/2020                    Patient will decrease TUG by 8 seconds to display MCID and progression to decreased falls risk.                 Status at IE: 28 seconds                 Current: No change 36 seconds 2/18/2020     Long Term Goals: To be accomplished in 8 weeks:                 AAZYCZR will increase strength to 4+/5 throughout B LEs to aid in return recreational activities and ADLs.                Status at IE: 3+/5                 Current: Same as IE                    Patient will improve Tinetti score to 22 to demonstrate decreased risk for falls.                 Status at IE: 16                 Current: Same as IE                    AVFSFVH will perform 6 sit to stands in 30 seconds without UEA to promote safety with transfers.                 Status at IE:6 with UEA                 Current: In-progress performed 2x10 sit to stands with UEA 2/13/2020                    Patient will improve FOTO to 67 points overall to demonstrate improvement in functional ability.                  Status at IE:FOTO score = 44 (an established functional score where 100 = no disability)                 Current: Same as IE    PLAN  []  Upgrade activities as tolerated     [x]  Continue plan of care  []  Update interventions per flow sheet       []  Discharge due to:_  []  Other:_      Lorene Cm, PT, DPT 2/18/2020  1:25 PM    Future Appointments Date Time Provider Niraj Nadege   2/20/2020  1:30 PM Farzaneh Munoz, PT [facility]   2/25/2020  1:00 PM Farzaneh Munoz, PT [facility]   2/27/2020  1:30 PM Farzaneh Munoz, PT [facility]   3/3/2020  1:00 PM Farzaneh Munoz, PT [facility]   3/5/2020  1:00 PM Farzaneh Munoz, PT [facility]   3/10/2020  1:00 PM Farzaneh Munoz, PT [facility]   3/12/2020  1:00 PM Farzaneh Munoz, PT [facility]

## 2020-02-20 ENCOUNTER — APPOINTMENT (OUTPATIENT)
Dept: PHYSICAL THERAPY | Age: 85
End: 2020-02-20
Payer: MEDICARE

## 2020-02-25 ENCOUNTER — HOSPITAL ENCOUNTER (OUTPATIENT)
Dept: PHYSICAL THERAPY | Age: 85
Discharge: HOME | End: 2020-02-25
Payer: MEDICARE

## 2020-02-25 PROCEDURE — 97110 THERAPEUTIC EXERCISES: CPT

## 2020-02-25 NOTE — PROGRESS NOTES
PT DAILY TREATMENT NOTE - [facility]     Patient Name: Estrellita Benites  Date:2020  : 1925  [x]  Patient  Verified  Payor: Jeanie Lombardi / Plan: [place] MEDICARE PART A & B / Product Type: Medicare /    In time:110  Out time:140  Total Treatment Time (min): 30  Total Timed Codes (min): 30   1:1 Treatment Time ( [address] only): 25   Visit #: 6 of 24    Treatment Area: Other abnormalities of gait and mobility [R26.89]  Right leg weakness [R29.898]  Left leg weakness [R29.898]  Right shoulder pain [M25.511]    SUBJECTIVE  Pain Level (0-10 scale): 0  Any medication changes, allergies to medications, adverse drug reactions, diagnosis change, or new procedure performed?: [x] No    [] Yes (see summary sheet for update)  Subjective functional status/changes:   [] No changes reported    Patient reports that she is sorry for being let but she feels stressed from rushing around. OBJECTIVE    25 min Therapeutic Exercise:  [] See flow sheet :   Rationale: increase ROM, increase strength and decrease pain to improve the patients ability to complete ADLs        With   [x] TE   [] TA   [] neuro   [] other: Patient Education: [x] Review HEP    [] Progressed/Changed HEP based on:   [] positioning   [] body mechanics   [] transfers   [] heat/ice application    [] other:      Other Objective/Functional Measures: NA     Pain Level (0-10 scale) post treatment: 0    ASSESSMENT/Changes in Function: Patient responds well to treatment session. Patient was challenge with exercises prescribed. She requires cues to recall exercise parameters. Will reassess patient next visit.  No adverse effects were noted from today's treatment session    Patient will continue to benefit from skilled PT services to modify and progress therapeutic interventions, address functional mobility deficits, address ROM deficits, address strength deficits, analyze and address soft tissue restrictions, analyze and cue movement patterns, analyze and modify body mechanics/ergonomics, assess and modify postural abnormalities, address imbalance and instruct in home and community integration to attain remaining goals. []  See Plan of Care  []  See progress note/recertification  []  See Discharge Summary         Progress towards goals / Updated goals:  Short Term Goals: To be accomplished in 4 weeks:                 AEYYYGQ will report compliance with HEP at least 1x/day to aid in rehabilitation program.                 Status at IE: Reviewed safe transfers with Rollator                 Current: In-progress reviewed walker safety 2/4/2020                    Patient will decrease TUG by 8 seconds to display MCID and progression to decreased falls risk.                 Status at IE: 28 seconds                 Current: No change 36 seconds 2/18/2020     Long Term Goals: To be accomplished in 8 weeks:                 IVQQYNF will increase strength to 4+/5 throughout B LEs to aid in return recreational activities and ADLs.                Status at IE: 3+/5                 Current: In-progress 4-/5 2/25/2020                    Patient will improve Tinetti score to 22 to demonstrate decreased risk for falls.                 Status at IE: 16                 Current: Same as IE                    Patient will perform 6 sit to stands in 30 seconds without UEA to promote safety with transfers.                 Status at IE:6 with UEA                 Current: In-progress performed 2x10 sit to stands with UEA and CGA 2/25/2020                    Patient will improve FOTO to 67 points overall to demonstrate improvement in functional ability.                  Status at IE:FOTO score = 44 (an established functional score where 100 = no disability)                 Current: Same as IE    PLAN  []  Upgrade activities as tolerated     [x]  Continue plan of care  []  Update interventions per flow sheet       []  Discharge due to:_  []  Other:_      Matty Pop, PT, DPT 2/25/2020  1:20 PM    Future Appointments   Date Time Provider Niraj Light   2/27/2020  1:30 PM Thanh Bobby, PT [facility]   3/3/2020  1:00 PM Kensamy Bobby, PT [facility]   3/5/2020  1:00 PM Kensamy Bobby, PT [facility]   3/10/2020  1:00 PM Kensamy Bobby, PT [facility]   3/12/2020  1:00 PM Thanh Bobby, PT [facility]

## 2020-02-27 ENCOUNTER — APPOINTMENT (OUTPATIENT)
Dept: PHYSICAL THERAPY | Age: 85
End: 2020-02-27
Payer: MEDICARE

## 2020-03-03 ENCOUNTER — HOSPITAL ENCOUNTER (OUTPATIENT)
Dept: PHYSICAL THERAPY | Age: 85
Discharge: HOME | End: 2020-03-03
Payer: MEDICARE

## 2020-03-03 PROCEDURE — 97110 THERAPEUTIC EXERCISES: CPT

## 2020-03-03 NOTE — PROGRESS NOTES
PT DAILY TREATMENT NOTE - [facility]     Patient Name: Dario Shoemaker  Date:3/3/2020  : 1925  [x]  Patient  Verified  Payor: Eliza Zambrano / Plan: [place] MEDICARE PART A & B / Product Type: Medicare /    In time:108  Out time:140  Total Treatment Time (min): 32  Total Timed Codes (min): 32   1:1 Treatment Time ([facility] only): 32   Visit #: 8 of 24    Treatment Area: Other abnormalities of gait and mobility [R26.89]  Right leg weakness [R29.898]  Left leg weakness [R29.898]  Right shoulder pain [M25.511]    SUBJECTIVE  Pain Level (0-10 scale): 0  Any medication changes, allergies to medications, adverse drug reactions, diagnosis change, or new procedure performed?: [x] No    [] Yes (see summary sheet for update)  Subjective functional status/changes:   [] No changes reported  Patient reports that it is stressful coming to therapy as it is a lot of work to get to her appointment. OBJECTIVE    25 min Therapeutic Exercise:  [x] See flow sheet :   Rationale: increase ROM, increase strength and decrease pain to improve the patients ability to complete ADLs          With   [x] TE   [] TA   [] neuro   [] other: Patient Education: [x] Review HEP    [] Progressed/Changed HEP based on:   [] positioning   [] body mechanics   [] transfers   [] heat/ice application    [] other:      Other Objective/Functional Measures:   FOTO 44  MMT 4/5  7 sit to stands with UEA in 30 seconds     Pain Level (0-10 scale) post treatment: 0    ASSESSMENT/Changes in Function: Patient responds well to treatment session. No adverse effects were noted from today's treatment session. Patient is being discharged as she reports that she is stressed to make her appointment times due to the work it takes to come in for treatment. Discussed discharge options with patient and she agreed that it would be best to continue on her own with HEP. She met  STGs but did not progress toward her LTGs. She has developed confidence with exercise.  Provided HEP to encourage continued participation with exercise to reduce future episodes of care. []  See Plan of Care  []  See progress note/recertification  []  See Discharge Summary         Progress towards goals / Updated goals:  Short Term Goals: To be accomplished in 4 weeks:                 WXYOEBG will report compliance with HEP at least 1x/day to aid in rehabilitation program.                 Status at IE: Reviewed safe transfers with Rollator                 Current: Met 3/3/2020                    Patient will decrease TUG by 8 seconds to display MCID and progression to decreased falls risk.                 Status at IE: 28 seconds                 Current: Not Met, No change 28 seconds 3/3/2020     Long Term Goals: To be accomplished in 8 weeks:                 DYHVFNF will increase strength to 4+/5 throughout B LEs to aid in return recreational activities and ADLs.                Status at IE: 3+/5                 Current:  Met 4/5 3/3/2020                    Patient will improve Tinetti score to 22 to demonstrate decreased risk for falls.                 Status at IE: 16                 Current:  Not met non change 16 3/3/2020                    Patient will perform 6 sit to stands in 30 seconds without UEA to promote safety with transfers.                 Status at IE:6 with UEA                 Current: Not met performed 7 sit to stands with UEA in 30 seconds 3/3/2020                    Patient will improve FOTO to 67 points overall to demonstrate improvement in functional ability.                Status at IE:FOTO score = 44 (an established functional score where 100 = no disability)                 Current:  Not Met no change 44, patient goal not met secondary to severity of condition prior to current episode of care. 3/3/2020  PLAN  []  Upgrade activities as tolerated     []  Continue plan of care  []  Update interventions per flow sheet       [x]  Discharge due to:  Met max medical benefit from skilled physical therapy.    []  Other:_      Wero Horse, PT, DPT 3/3/2020  1:13 PM    Future Appointments   Date Time Provider Niraj Light   3/5/2020  1:00 PM DARYL [place] [facility]   3/10/2020  1:00 PM DARYL [place] [facility]   3/12/2020  1:00 PM DARYL [place] [facility]

## 2020-03-03 NOTE — PROGRESS NOTES
[facility] at [address]: [phone]   Fax: [phone]  Discharge Summary  Patient Name: Viji Estrada : 1925   Medical   Diagnosis: Other abnormalities of gait and mobility [R26.89]  Right leg weakness [R29.898]  Left leg weakness [R29.898]  Right shoulder pain [M25.511] Treatment Diagnosis: Other abnormalities of Gait  Left Leg Weakness  Right Leg Weakness  Right Shoulder Pain   Onset Date: Chronic     Referral Source: Cheryl Luz [facility]): 3/3/2020   Prior Hospitalization: See medical history Provider #: [id number]   Prior Level of Function: Sedentary, Ambulation with Rollator   Comorbidities: Hearing impairment, Hx of colon cancer   Medications: Verified on Patient Summary List      ===========================================================================================  Assessment / Summary of Care:  Viji Estrada is a 80 y.o. female with  with c/o extended Hx of decreased balance and unsteadiness of gait resulting in reduce activity tolerance and participation. Patient is being discharged as she reports that she is stressed to make her appointment times due to the work it takes to come in for treatment. Discussed discharge options with patient and she agreed that it would be best to continue on her own with HEP. She met 1/ STGs but did not progress toward her LTGs. She has developed confidence with exercise. Provided HEP to encourage continued participation with exercise to reduce future episodes of care.     ===========================================================================================    Plan: Discharge to independent [facility].      Goals:     Short Term Goals: To be accomplished in 4 weeks:                 ONNGEQK will report compliance with HEP at least 1x/day to aid in rehabilitation program.                 Status at IE: Reviewed safe transfers with Rollator                 Current: Met 3/3/2020                    Patient will decrease TUG by 8 seconds to display MCID and progression to decreased falls risk.                 Status at IE: 28 seconds                 Current: Not Met, No change 28 seconds 3/3/2020     Long Term Goals: To be accomplished in 8 weeks:                 Patient will increase strength to 4+/5 throughout B LEs to aid in return recreational activities and ADLs.                Status at IE: 3+/5                 Current:  Met 4/5 3/3/2020                    Patient will improve Tinetti score to 22 to demonstrate decreased risk for falls.                 Status at IE: 16                 Current:  Not met non change 16 3/3/2020                    Patient will perform 6 sit to stands in 30 seconds without UEA to promote safety with transfers.                 Status at IE:6 with UEA                 Current: Not met performed 7 sit to stands with UEA in 30 seconds 3/3/2020                    Patient will improve FOTO to 67 points overall to demonstrate improvement in functional ability.                Status at IE:FOTO score = 44 (an established functional score where 100 = no disability)                 Current:  Not Met no change 44, patient goal not met secondary to severity of condition prior to current episode of care.  3/3/2020    ===========================================================================================  Subjective: Patient reports that it is stressful coming to therapy as it is a lot of work to get to her appointment    Objective:   FOTO 44  MMT 4/5  7 sit to stands with UEA in 30 seconds      Therapist Signature: Benitez Arvizu PT, DPT Date: 3/3/2020     Time: 2:02 PM

## 2020-03-05 ENCOUNTER — APPOINTMENT (OUTPATIENT)
Dept: PHYSICAL THERAPY | Age: 85
End: 2020-03-05
Payer: MEDICARE

## 2020-03-10 ENCOUNTER — APPOINTMENT (OUTPATIENT)
Dept: PHYSICAL THERAPY | Age: 85
End: 2020-03-10
Payer: MEDICARE

## 2020-03-12 ENCOUNTER — APPOINTMENT (OUTPATIENT)
Dept: PHYSICAL THERAPY | Age: 85
End: 2020-03-12
Payer: MEDICARE

## 2021-03-29 ENCOUNTER — APPOINTMENT (OUTPATIENT)
Dept: GENERAL RADIOLOGY | Age: 86
End: 2021-03-29
Attending: EMERGENCY MEDICINE
Payer: MEDICARE

## 2021-03-29 ENCOUNTER — APPOINTMENT (OUTPATIENT)
Dept: CT IMAGING | Age: 86
End: 2021-03-29
Attending: EMERGENCY MEDICINE
Payer: MEDICARE

## 2021-03-29 ENCOUNTER — HOSPITAL ENCOUNTER (EMERGENCY)
Age: 86
Discharge: HOME | End: 2021-03-30
Attending: EMERGENCY MEDICINE
Payer: MEDICARE

## 2021-03-29 DIAGNOSIS — R53.1 GENERALIZED WEAKNESS: ICD-10-CM

## 2021-03-29 DIAGNOSIS — U07.1 COVID-19 VIRUS INFECTION: Primary | ICD-10-CM

## 2021-03-29 LAB
ALBUMIN SERPL-MCNC: 3.8 G/DL (ref 3.4–5)
ALBUMIN/GLOB SERPL: 1.2 {RATIO} (ref 0.8–1.7)
ALP SERPL-CCNC: 81 U/L (ref 45–117)
ALT SERPL-CCNC: 16 U/L (ref 13–56)
ANION GAP SERPL CALC-SCNC: 6 MMOL/L (ref 3–18)
APPEARANCE UR: CLEAR
AST SERPL-CCNC: 18 U/L (ref 10–38)
BACTERIA URNS QL MICRO: ABNORMAL /HPF
BASOPHILS # BLD: 0 K/UL (ref 0–0.1)
BASOPHILS NFR BLD: 0 % (ref 0–2)
BILIRUB SERPL-MCNC: 0.5 MG/DL (ref 0.2–1)
BILIRUB UR QL: NEGATIVE
BNP SERPL-MCNC: 288 PG/ML (ref 0–1800)
BUN SERPL-MCNC: 27 MG/DL (ref 7–18)
BUN/CREAT SERPL: 35 (ref 12–20)
CALCIUM SERPL-MCNC: 9.5 MG/DL (ref 8.5–10.1)
CHLORIDE SERPL-SCNC: 96 MMOL/L (ref 100–111)
CK MB CFR SERPL CALC: 5.6 % (ref 0–4)
CK MB SERPL-MCNC: 1.9 NG/ML (ref 5–25)
CK SERPL-CCNC: 34 U/L (ref 26–192)
CO2 SERPL-SCNC: 32 MMOL/L (ref 21–32)
COLOR UR: YELLOW
CREAT SERPL-MCNC: 0.78 MG/DL (ref 0.6–1.3)
D DIMER PPP FEU-MCNC: 3.05 UG/ML(FEU)
DIFFERENTIAL METHOD BLD: ABNORMAL
EOSINOPHIL # BLD: 0 K/UL (ref 0–0.4)
EOSINOPHIL NFR BLD: 1 % (ref 0–5)
EPITH CASTS URNS QL MICRO: ABNORMAL /LPF (ref 0–5)
ERYTHROCYTE [DISTWIDTH] IN BLOOD BY AUTOMATED COUNT: 13 % (ref 11.6–14.5)
GLOBULIN SER CALC-MCNC: 3.2 G/DL (ref 2–4)
GLUCOSE SERPL-MCNC: 103 MG/DL (ref 74–99)
GLUCOSE UR STRIP.AUTO-MCNC: NEGATIVE MG/DL
HCT VFR BLD AUTO: 38.5 % (ref 35–45)
HGB BLD-MCNC: 12.5 G/DL (ref 12–16)
HGB UR QL STRIP: NEGATIVE
KETONES UR QL STRIP.AUTO: ABNORMAL MG/DL
LEUKOCYTE ESTERASE UR QL STRIP.AUTO: ABNORMAL
LYMPHOCYTES # BLD: 1.2 K/UL (ref 0.9–3.6)
LYMPHOCYTES NFR BLD: 22 % (ref 21–52)
MCH RBC QN AUTO: 31.8 PG (ref 24–34)
MCHC RBC AUTO-ENTMCNC: 32.5 G/DL (ref 31–37)
MCV RBC AUTO: 98 FL (ref 74–97)
MONOCYTES # BLD: 0.5 K/UL (ref 0.05–1.2)
MONOCYTES NFR BLD: 8 % (ref 3–10)
NEUTS SEG # BLD: 3.8 K/UL (ref 1.8–8)
NEUTS SEG NFR BLD: 69 % (ref 40–73)
NITRITE UR QL STRIP.AUTO: NEGATIVE
PH UR STRIP: 5 [PH] (ref 5–8)
PLATELET # BLD AUTO: 338 K/UL (ref 135–420)
PMV BLD AUTO: 9.8 FL (ref 9.2–11.8)
POTASSIUM SERPL-SCNC: 4.1 MMOL/L (ref 3.5–5.5)
PROT SERPL-MCNC: 7 G/DL (ref 6.4–8.2)
PROT UR STRIP-MCNC: NEGATIVE MG/DL
RBC # BLD AUTO: 3.93 M/UL (ref 4.2–5.3)
RBC #/AREA URNS HPF: NEGATIVE /HPF (ref 0–5)
SODIUM SERPL-SCNC: 134 MMOL/L (ref 136–145)
SP GR UR REFRACTOMETRY: 1.02 (ref 1–1.03)
TROPONIN I SERPL-MCNC: <0.02 NG/ML (ref 0–0.04)
UROBILINOGEN UR QL STRIP.AUTO: 1 EU/DL (ref 0.2–1)
WBC # BLD AUTO: 5.5 K/UL (ref 4.6–13.2)
WBC URNS QL MICRO: ABNORMAL /HPF (ref 0–5)

## 2021-03-29 PROCEDURE — 80053 COMPREHEN METABOLIC PANEL: CPT

## 2021-03-29 PROCEDURE — 84484 ASSAY OF TROPONIN QUANT: CPT

## 2021-03-29 PROCEDURE — 74011250636 HC RX REV CODE- 250/636: Performed by: EMERGENCY MEDICINE

## 2021-03-29 PROCEDURE — 85025 COMPLETE CBC W/AUTO DIFF WBC: CPT

## 2021-03-29 PROCEDURE — 85379 FIBRIN DEGRADATION QUANT: CPT

## 2021-03-29 PROCEDURE — 81001 URINALYSIS AUTO W/SCOPE: CPT

## 2021-03-29 PROCEDURE — M0239 BAMLANIVIMAB-XXXX INFUSION: HCPCS

## 2021-03-29 PROCEDURE — 83880 ASSAY OF NATRIURETIC PEPTIDE: CPT

## 2021-03-29 PROCEDURE — 70450 CT HEAD/BRAIN W/O DYE: CPT

## 2021-03-29 PROCEDURE — 93005 ELECTROCARDIOGRAM TRACING: CPT

## 2021-03-29 PROCEDURE — 99285 EMERGENCY DEPT VISIT HI MDM: CPT

## 2021-03-29 PROCEDURE — 71045 X-RAY EXAM CHEST 1 VIEW: CPT

## 2021-03-29 RX ORDER — SODIUM CHLORIDE 9 MG/ML
500 INJECTION, SOLUTION INTRAVENOUS ONCE
Status: COMPLETED | OUTPATIENT
Start: 2021-03-29 | End: 2021-03-29

## 2021-03-29 RX ADMIN — SODIUM CHLORIDE 500 ML/HR: 900 INJECTION, SOLUTION INTRAVENOUS at 20:12

## 2021-03-29 NOTE — ED TRIAGE NOTES
Pt to ED for increased weakness, fall, increased confusion, pt w/ recent covid positive rapid test, per daughter. Pt has hx of colon CA, divertculitis, colon resection.

## 2021-03-29 NOTE — ED PROVIDER NOTES
EMERGENCY DEPARTMENT HISTORY AND PHYSICAL EXAM    Date: 3/29/2021  Patient Name: Miki Nieto    History of Presenting Illness     Chief Complaint   Patient presents with    Positive For Covid-19    Extremity Weakness         History Provided By: Patient and patient's daughter    Additional History (Context):   Miki Nieto is a 80 y.o. female with PMHX hypertension presents to the emergency department via vehicle with her daughter C/O increasing generalized weakness resulting in falls, difficulty walking, diarrhea, and after exposure by her caregiver to Covid, and a rapid Covid test that was positive today. Daughter reports an episode of diarrhea today. Pt denies pain, abdominal pain, nausea, vomiting, and any other sxs or complaints. No relieving or exacerbating factors identified. PCP: Lisa Hickey,     Current Outpatient Medications   Medication Sig Dispense Refill    amLODIPine (NORVASC) 2.5 mg tablet Take 2.5 mg by mouth daily.  oxybutynin chloride XL (DITROPAN XL) 10 mg CR tablet Take 10 mg by mouth daily.  hydrochlorothiazide (HYDRODIURIL) 25 mg tablet Take 25 mg by mouth daily.  benazepril (LOTENSIN) 20 mg tablet Take 1 Tab by mouth daily. (Patient taking differently: Take 40 mg by mouth daily.) 20 Tab 0    aspirin 81 mg chewable tablet Take 81 mg by mouth daily.  pravastatin (PRAVACHOL) 40 mg tablet Take 40 mg by mouth nightly. Past History     Past Medical History:  Past Medical History:   Diagnosis Date    Colon cancer ([facility] [address]     Hypertension        Past Surgical History:  Past Surgical History:   Procedure Laterality Date    HX TOTAL COLECTOMY         Family History:  History reviewed. No pertinent family history. Social History:  Social History     Tobacco Use    Smoking status: Former Smoker    Smokeless tobacco: Never Used   Substance Use Topics    Alcohol use:  Yes     Alcohol/week: 0.8 standard drinks     Types: 1 Glasses of wine per week     Comment: glass of wine every night    Drug use: No       Allergies:  No Known Allergies      Review of Systems   Review of Systems   Constitutional: Negative for chills and fever. HENT: Negative for congestion, ear pain, sinus pain and sore throat. Eyes: Negative for pain and visual disturbance. Respiratory: Negative for cough and shortness of breath. Cardiovascular: Negative for chest pain and leg swelling. Gastrointestinal: Positive for diarrhea. Negative for abdominal pain, constipation, nausea and vomiting. Genitourinary: Negative for dysuria, hematuria, vaginal bleeding and vaginal discharge. Musculoskeletal: Negative for back pain and neck pain. Skin: Negative for rash and wound. Neurological: Positive for weakness. Negative for dizziness, tremors, light-headedness and numbness. All other systems reviewed and are negative. Physical Exam     Vitals:    03/30/21 0345 03/30/21 0357 03/30/21 0413 03/30/21 0430   BP: 112/60 120/69 135/67 120/66   Pulse: 66 76 67 68   Resp:  16 16    Temp:  97.7 °F (36.5 °C) 97.7 °F (36.5 °C)    SpO2: 97% 97% 97% 98%   Weight:       Height:         Physical Exam    Nursing note and vitals reviewed    Constitutional: Elderly  female, no acute distress  Head: Normocephalic, Atraumatic  Eyes: Pupils are equal, round, and reactive to light, EOMI  Neck: Supple, non-tender  Cardiovascular: Regular rate and rhythm, no murmurs, rubs, or gallops, + 2 radial pulses bilaterally  Chest: Normal work of breathing and chest excursion bilaterally  Lungs: Clear to ausculation bilaterally, no wheezes, no rhonchi  Abdomen: Soft, non tender, non distended, normoactive bowel sounds  Back: No evidence of trauma or deformity  Extremities: No evidence of trauma or deformity, no LE edema.  No streaking erythema, vesicular lesions, ulcerations or bulla  Skin: Warm and dry, normal cap refill  Neuro: Alert and appropriate, CN intact, normal speech, moving all 4 extremities freely and symmetrically  Psychiatric: Normal mood and affect       Diagnostic Study Results     Labs -     Recent Results (from the past 12 hour(s))   EKG, 12 LEAD, INITIAL    Collection Time: 03/29/21  6:15 PM   Result Value Ref Range    Ventricular Rate 99 BPM    Atrial Rate 99 BPM    P-R Interval 180 ms    QRS Duration 112 ms    Q-T Interval 392 ms    QTC Calculation (Bezet) 503 ms    Calculated P Axis 71 degrees    Calculated R Axis 83 degrees    Calculated T Axis -35 degrees    Diagnosis       Normal sinus rhythm  Possible Left atrial enlargement  Incomplete right bundle branch block  Septal infarct , age undetermined  ST & T wave abnormality, consider inferior ischemia  ST & T wave abnormality, consider anterior ischemia  Abnormal ECG  When compared with ECG of 19-FEB-2016 18:22,  Significant changes have occurred     CBC WITH AUTOMATED DIFF    Collection Time: 03/29/21  7:45 PM   Result Value Ref Range    WBC 5.5 4.6 - 13.2 K/uL    RBC 3.93 (L) 4.20 - 5.30 M/uL    HGB 12.5 12.0 - 16.0 g/dL    HCT 38.5 35.0 - 45.0 %    MCV 98.0 (H) 74.0 - 97.0 FL    MCH 31.8 24.0 - 34.0 PG    MCHC 32.5 31.0 - 37.0 g/dL    RDW 13.0 11.6 - 14.5 %    PLATELET 117 636 - 648 K/uL    MPV 9.8 9.2 - 11.8 FL    NEUTROPHILS 69 40 - 73 %    LYMPHOCYTES 22 21 - 52 %    MONOCYTES 8 3 - 10 %    EOSINOPHILS 1 0 - 5 %    BASOPHILS 0 0 - 2 %    ABS. NEUTROPHILS 3.8 1.8 - 8.0 K/UL    ABS. LYMPHOCYTES 1.2 0.9 - 3.6 K/UL    ABS. MONOCYTES 0.5 0.05 - 1.2 K/UL    ABS. EOSINOPHILS 0.0 0.0 - 0.4 K/UL    ABS.  BASOPHILS 0.0 0.0 - 0.1 K/UL    DF AUTOMATED     METABOLIC PANEL, COMPREHENSIVE    Collection Time: 03/29/21  7:45 PM   Result Value Ref Range    Sodium 134 (L) 136 - 145 mmol/L    Potassium 4.1 3.5 - 5.5 mmol/L    Chloride 96 (L) 100 - 111 mmol/L    CO2 32 21 - 32 mmol/L    Anion gap 6 3.0 - 18 mmol/L    Glucose 103 (H) 74 - 99 mg/dL    BUN 27 (H) 7.0 - 18 MG/DL    Creatinine 0.78 0.6 - 1.3 MG/DL    BUN/Creatinine ratio 35 (H) 12 - 20      GFR est AA >60 >60 ml/min/1.73m2    GFR est non-AA >60 >60 ml/min/1.73m2    Calcium 9.5 8.5 - 10.1 MG/DL    Bilirubin, total 0.5 0.2 - 1.0 MG/DL    ALT (SGPT) 16 13 - 56 U/L    AST (SGOT) 18 10 - 38 U/L    Alk. phosphatase 81 45 - 117 U/L    Protein, total 7.0 6.4 - 8.2 g/dL    Albumin 3.8 3.4 - 5.0 g/dL    Globulin 3.2 2.0 - 4.0 g/dL    A-G Ratio 1.2 0.8 - 1.7     CARDIAC PANEL,(CK, CKMB & TROPONIN)    Collection Time: 03/29/21  7:45 PM   Result Value Ref Range    CK - MB 1.9 <3.6 ng/ml    CK-MB Index 5.6 (H) 0.0 - 4.0 %    CK 34 26 - 192 U/L    Troponin-I, QT <0.02 0.0 - 0.045 NG/ML   D DIMER    Collection Time: 03/29/21  7:45 PM   Result Value Ref Range    D DIMER 3.05 (H) <0.46 ug/ml(FEU)   NT-PRO BNP    Collection Time: 03/29/21  7:45 PM   Result Value Ref Range    NT pro- 0 - 1,800 PG/ML   URINALYSIS W/ RFLX MICROSCOPIC    Collection Time: 03/29/21 11:05 PM   Result Value Ref Range    Color YELLOW      Appearance CLEAR      Specific gravity 1.020 1.005 - 1.030      pH (UA) 5.0 5.0 - 8.0      Protein Negative NEG mg/dL    Glucose Negative NEG mg/dL    Ketone TRACE (A) NEG mg/dL    Bilirubin Negative NEG      Blood Negative NEG      Urobilinogen 1.0 0.2 - 1.0 EU/dL    Nitrites Negative NEG      Leukocyte Esterase SMALL (A) NEG     URINE MICROSCOPIC ONLY    Collection Time: 03/29/21 11:05 PM   Result Value Ref Range    WBC 4 to 10 0 - 5 /hpf    RBC Negative 0 - 5 /hpf    Epithelial cells 1+ 0 - 5 /lpf    Bacteria FEW (A) NEG /hpf       Radiologic Studies -   CTA CHEST W OR W WO CONT   Final Result      1. No evidence of pulmonary embolism. 2.  No active disease. CT HEAD WO CONT   Final Result      No acute intracranial abnormalities. XR CHEST PORT   Final Result   No acute process        CT Results  (Last 48 hours)               03/30/21 0200  CTA CHEST W OR W WO CONT Final result    Impression:      1. No evidence of pulmonary embolism. 2.  No active disease. Narrative:  EXAM: CTA chest       INDICATION: Pain. Shortness of breath. Covid 19 disease. COMPARISON: None       TECHNIQUE: Axial CT imaging from the thoracic inlet through the diaphragm with   intravenous contrast. Coronal and sagittal MIP reformats were generated. Dose   reduction techniques used: automated exposure control, adjustment of the mAs   and/or kVp according to patient size, and iterative reconstruction techniques. Digital imaging and communications in Medicine (DICOM) format image data are   available to nonaffiliated external healthcare facilities or entities on a   secure, media free, reciprocally searchable basis with patient authorization for   at least 12 months after this study. _______________       FINDINGS:       EXAM QUALITY: Adequate. PULMONARY ARTERIES: No evidence of pulmonary embolism. MEDIASTINUM: Normal heart size. No evidence of right heart strain. Aorta is   unremarkable. No pericardial effusion. LUNGS: There is scarring at the lung bases. No suspicious nodule or mass. No   abnormal opacities. PLEURA: Normal.       AIRWAY: Normal.       LYMPH NODES: No enlarged nodes. UPPER ABDOMEN: There is a moderate hiatal hernia. OTHER: There is curvature of the thoracic spine to the right. No acute or   aggressive osseous abnormalities identified. _______________           03/29/21 2047  CT HEAD WO CONT Final result    Impression:      No acute intracranial abnormalities. Narrative:  EXAM: CT head       INDICATION: Weakness fall increased confusion       COMPARISON: June 1, 2016       TECHNIQUE: Axial CT imaging of the head was performed without intravenous   contrast. One or more dose reduction techniques were used on this CT: automated   exposure control, adjustment of the mAs and/or kVp according to patient size,   and iterative reconstruction techniques.   The specific techniques used on this   CT exam have been documented in the patient's electronic medical record. Digital   Imaging and Communications in Medicine (DICOM) format image data are available   to nonaffiliated external healthcare facilities or entities on a secure, media   free, reciprocally searchable basis with patient authorization for at least a   12-month period after this study. _______________       FINDINGS:       BRAIN AND POSTERIOR FOSSA: There is age-appropriate atrophy. There is no   intracranial hemorrhage, mass effect, or midline shift. Moderate to significant   small vessel ischemic disease present. EXTRA-AXIAL SPACES AND MENINGES: There are no abnormal extra-axial fluid   collections. CALVARIUM: Intact. SINUSES: Clear. OTHER: None.       _______________               CXR Results  (Last 48 hours)               03/1934  XR CHEST PORT Final result    Impression:  No acute process       Narrative:  EXAM:  AP Portable Chest X-ray 1 view        INDICATION: Increased confusion, Covid positive       COMPARISON: February 19, 2016       _______________       FINDINGS:  Heart and mediastinal contours are within normal limits for portable   radiograph. Moderate emphysema. Lungs are clear of active disease. There are no   pleural effusions. No acute osseous findings. There is scoliosis of   thoracolumbar spine. Hiatal hernia present.       ________________                       Medical Decision Making   I am the first provider for this patient. I reviewed the vital signs, available nursing notes, past medical history, past surgical history, family history and social history. Vital Signs-Reviewed the patient's vital signs. Pulse Oximetry Analysis -99% on room air    Cardiac Monitor:  Rate: 81 bpm  Rhythm: Regular    EKG interpretation: (Preliminary)  7:59 PM   Normal sinus rhythm at 99 bpm.  CO interval 180 ms.  ms. QTc 503 ms.   No acute ST elevation    Records Reviewed: Nursing Notes and Old Medical Records    Provider Notes:   80 y.o. female presenting with generalized weakness, diarrhea, diagnosed with Covid today. On exam patient is afebrile, normotensive, not tachycardic. She is in no respiratory distress and saturating 99% on room air. She does not appear toxic or acutely ill. With no focal neurological deficits. With her reported history of frequent falls due to her generalized weakness, will obtain a CT scan of the head to ensure that she had no acute intracranial injury. Will obtain lab work to evaluate for any metabolic derangements. Also obtain a chest x-ray to rule out overlying bacterial pneumonia. If lab work and chest x-ray is reassuring, discussed with patient and daughter in regards to monoclonal antibody administration as patient would be an ideal candidate. Procedures:  Procedures    ED Course:   7:45 PM Initial assessment performed. The patients presenting problems have been discussed, and they are in agreement with the care plan formulated and outlined with them. I have encouraged them to ask questions as they arise throughout their visit. 5:48 AM  Troponin and BNP within normal limits. However D-dimer elevated. CTA was negative for PE. After discussion with the patient's daughter, they are in agreement with [facility] administration as the patient is not hypoxic does not meet indication for admission. Patient received the monoclonal antibody was observed for an hour with no adverse reaction. Patient's daughter request case management referral to discuss long-term care placement. Consult placed for case management to discuss options at 8 AM.         Diagnosis and Disposition       DISCHARGE NOTE:  5:48 AM    Lui sE Tran's  results have been reviewed with her. She has been counseled regarding her diagnosis, treatment, and plan. She verbally conveys understanding and agreement of the signs, symptoms, diagnosis, treatment and prognosis and additionally agrees to follow up as discussed.   She also agrees with the care-plan and conveys that all of her questions have been answered. I have also provided discharge instructions for her that include: educational information regarding their diagnosis and treatment, and list of reasons why they would want to return to the ED prior to their follow-up appointment, should her condition change. She has been provided with education for proper emergency department utilization. CLINICAL IMPRESSION:    1. COVID-19 virus infection    2. Generalized weakness        PLAN:  1. D/C Home  2. Current Discharge Medication List        3. Follow-up Information     Follow up With Specialties Details Why Contact Info    Gianna Hamilton, DO Family Medicine Schedule an appointment as soon as possible for a visit in 2 days  [address] [place] [id number] [phone]      [facility] EMERGENCY DEPT Emergency Medicine   [address]  [phone]        ____________________________________     Please note that this dictation was completed with [employer], the computer voice recognition software. Quite often unanticipated grammatical, syntax, homophones, and other interpretive errors are inadvertently transcribed by the computer software. Please disregard these errors. Please excuse any errors that have escaped final proofreading.

## 2021-03-30 ENCOUNTER — APPOINTMENT (OUTPATIENT)
Dept: CT IMAGING | Age: 86
End: 2021-03-30
Attending: EMERGENCY MEDICINE
Payer: MEDICARE

## 2021-03-30 ENCOUNTER — HOME HEALTH ADMISSION (OUTPATIENT)
Dept: HOME HEALTH SERVICES | Facility: HOME HEALTH | Age: 86
End: 2021-03-30
Payer: MEDICARE

## 2021-03-30 VITALS
HEART RATE: 68 BPM | SYSTOLIC BLOOD PRESSURE: 120 MMHG | RESPIRATION RATE: 16 BRPM | OXYGEN SATURATION: 98 % | BODY MASS INDEX: 21.6 KG/M2 | HEIGHT: 60 IN | DIASTOLIC BLOOD PRESSURE: 66 MMHG | WEIGHT: 110 LBS | TEMPERATURE: 97.7 F

## 2021-03-30 LAB
ATRIAL RATE: 99 BPM
CALCULATED P AXIS, ECG09: 71 DEGREES
CALCULATED R AXIS, ECG10: 83 DEGREES
CALCULATED T AXIS, ECG11: -35 DEGREES
DIAGNOSIS, 93000: NORMAL
P-R INTERVAL, ECG05: 180 MS
Q-T INTERVAL, ECG07: 392 MS
QRS DURATION, ECG06: 112 MS
QTC CALCULATION (BEZET), ECG08: 503 MS
VENTRICULAR RATE, ECG03: 99 BPM

## 2021-03-30 PROCEDURE — 74011000636 HC RX REV CODE- 636: Performed by: EMERGENCY MEDICINE

## 2021-03-30 PROCEDURE — 74011000258 HC RX REV CODE- 258: Performed by: EMERGENCY MEDICINE

## 2021-03-30 PROCEDURE — 71275 CT ANGIOGRAPHY CHEST: CPT

## 2021-03-30 RX ADMIN — SODIUM CHLORIDE 700 MG: 9 INJECTION, SOLUTION INTRAVENOUS at 03:02

## 2021-03-30 RX ADMIN — IOPAMIDOL 60 ML: 755 INJECTION, SOLUTION INTRAVENOUS at 02:03

## 2021-03-30 NOTE — ED NOTES
I have reviewed discharge instructions with the patient. The patient verbalized understanding. Assumed care for d/c only  Daughter to drive her home.   Cm has set up c

## 2021-03-30 NOTE — PROGRESS NOTES
Chart reviewed and noted Pt's require [place] services as not medically needed to be admitted. CELINE spoke with Thomas Tori [phone]. Per Miguel Ardon She is arranging for private duty to provide care to parents beginning tomorrow with [facility]. CM discussed supplementing [place] services to help offset some of the [facility] needs- Will send [place] nurse, therapy, and aid for additional support. [facility] for [address] [place] [address] [phone] who will start of care Wednesday. CM asked Nursing to provide pulse ox if criteria met at discharge.

## 2021-03-31 ENCOUNTER — PATIENT OUTREACH (OUTPATIENT)
Dept: CASE MANAGEMENT | Age: 86
End: 2021-03-31

## 2021-03-31 ENCOUNTER — HOME CARE VISIT (OUTPATIENT)
Dept: SCHEDULING | Facility: HOME HEALTH | Age: 86
End: 2021-03-31
Payer: MEDICARE

## 2021-03-31 PROCEDURE — 3331090001 HH PPS REVENUE CREDIT

## 2021-03-31 PROCEDURE — 400018 HH-NO PAY CLAIM PROCEDURE

## 2021-03-31 PROCEDURE — G0151 HHCP-SERV OF PT,EA 15 MIN: HCPCS

## 2021-03-31 PROCEDURE — 400013 HH SOC

## 2021-03-31 PROCEDURE — 3331090002 HH PPS REVENUE DEBIT

## 2021-03-31 NOTE — PROGRESS NOTES
Patient contacted regarding recent visit for viral symptoms. Outreach made within 2 business days of discharge: Yes  Patient's daughter Sandy Taylor assisted with consent of the patient.  contacted the family by telephone to perform post discharge call. Verified name and  with family as identifiers. Provided introduction to self, and reason for call due to viral symptoms of infection and/or exposure to COVID-19. Discussed COVID-19 related testing which was Patient was tested elsewhere at this time. Test results were positive. Patient informed of results, if available? Patient obtained results from location of testing. Advance Care Planning:   Does patient have an Advance Directive: currently not on file; education provided     Patient presented to emergency department/flu clinic with complaints of viral symptoms/exposure to COVID. Patient reports symptoms are the same. Due to no new or worsening symptoms the RN CTN/SHARON was not notified for escalation. Discussed exposure protocols and quarantine with CDC Guidelines What To Do If You Are Sick    Family was given an opportunity for questions and concerns. Stay home except to get medical care  Separate yourself from other people and animals in your home  Call ahead before visiting your doctor  Wear a facemask  Cover your coughs and sneezes  Clean your hands often  Avoid sharing personal household items  Clean all high-touch surfaces everyday    Monitor your symptoms  Seek prompt medical attention if your illness is worsening (e.g., difficulty breathing). Before seeking care, call your healthcare provider and tell them that you have, or are being evaluated for, COVID-19. Put on a facemask before you enter the facility. These steps will help the healthcare provider's office to keep other people in the office or waiting room from getting infected or exposed. Ask your healthcare provider to call the local or state health department.  Persons who are placed under If you have a medical emergency and need to call 911, notify the dispatch personnel that you have, or are being evaluated for COVID-19. If possible, put on a facemask before emergency medical services arrive. The family agrees to contact the [facility] exposure line [phone], [facility] [address]  [phone] and PCP office for questions related to their healthcare. Author provided contact information for future reference. Patient/family/caregiver given information for [facility] and agrees to enroll no  Patient preferred e-mail: n/a  Patient preferred phone contact: n/a   Based on Loop alert triggers, patient will be contacted by nurse care manager for worsening symptoms. The patient's daughter informed plans of moving the patient and spouse into a long term facility and is considering relocation to a different state.

## 2021-04-01 ENCOUNTER — HOME CARE VISIT (OUTPATIENT)
Dept: HOME HEALTH SERVICES | Facility: HOME HEALTH | Age: 86
End: 2021-04-01
Payer: MEDICARE

## 2021-04-01 VITALS
TEMPERATURE: 97.8 F | HEART RATE: 80 BPM | OXYGEN SATURATION: 93 % | RESPIRATION RATE: 16 BRPM | SYSTOLIC BLOOD PRESSURE: 123 MMHG | DIASTOLIC BLOOD PRESSURE: 74 MMHG

## 2021-04-01 PROCEDURE — 3331090002 HH PPS REVENUE DEBIT

## 2021-04-01 PROCEDURE — 3331090001 HH PPS REVENUE CREDIT

## 2021-04-02 PROCEDURE — 3331090002 HH PPS REVENUE DEBIT

## 2021-04-02 PROCEDURE — 3331090001 HH PPS REVENUE CREDIT

## 2021-04-03 ENCOUNTER — HOME CARE VISIT (OUTPATIENT)
Dept: SCHEDULING | Facility: HOME HEALTH | Age: 86
End: 2021-04-03
Payer: MEDICARE

## 2021-04-03 VITALS
RESPIRATION RATE: 16 BRPM | DIASTOLIC BLOOD PRESSURE: 72 MMHG | OXYGEN SATURATION: 96 % | SYSTOLIC BLOOD PRESSURE: 122 MMHG | HEART RATE: 78 BPM | TEMPERATURE: 98.8 F

## 2021-04-03 PROCEDURE — G0299 HHS/HOSPICE OF RN EA 15 MIN: HCPCS

## 2021-04-03 PROCEDURE — 3331090002 HH PPS REVENUE DEBIT

## 2021-04-03 PROCEDURE — 3331090001 HH PPS REVENUE CREDIT

## 2021-04-04 PROCEDURE — 3331090002 HH PPS REVENUE DEBIT

## 2021-04-04 PROCEDURE — 3331090001 HH PPS REVENUE CREDIT

## 2021-04-05 PROCEDURE — 3331090002 HH PPS REVENUE DEBIT

## 2021-04-05 PROCEDURE — 3331090001 HH PPS REVENUE CREDIT

## 2021-04-06 ENCOUNTER — HOME CARE VISIT (OUTPATIENT)
Dept: HOME HEALTH SERVICES | Facility: HOME HEALTH | Age: 86
End: 2021-04-06
Payer: MEDICARE

## 2021-04-06 PROCEDURE — 3331090002 HH PPS REVENUE DEBIT

## 2021-04-06 PROCEDURE — 3331090001 HH PPS REVENUE CREDIT

## 2021-04-07 PROCEDURE — 3331090002 HH PPS REVENUE DEBIT

## 2021-04-07 PROCEDURE — 3331090001 HH PPS REVENUE CREDIT

## 2021-04-08 PROCEDURE — 3331090002 HH PPS REVENUE DEBIT

## 2021-04-08 PROCEDURE — 3331090003 HH PPS REVENUE ADJ

## 2021-04-08 PROCEDURE — 3331090001 HH PPS REVENUE CREDIT

## 2021-04-09 PROCEDURE — 3331090001 HH PPS REVENUE CREDIT

## 2021-04-09 PROCEDURE — 3331090002 HH PPS REVENUE DEBIT

## 2021-04-10 PROCEDURE — 3331090001 HH PPS REVENUE CREDIT

## 2021-04-10 PROCEDURE — 3331090002 HH PPS REVENUE DEBIT

## 2021-04-11 PROCEDURE — 3331090001 HH PPS REVENUE CREDIT

## 2021-04-11 PROCEDURE — 3331090002 HH PPS REVENUE DEBIT

## 2021-04-12 ENCOUNTER — HOME CARE VISIT (OUTPATIENT)
Dept: HOME HEALTH SERVICES | Facility: HOME HEALTH | Age: 86
End: 2021-04-12
Payer: MEDICARE

## 2021-04-12 PROCEDURE — 3331090002 HH PPS REVENUE DEBIT

## 2021-04-12 PROCEDURE — 3331090001 HH PPS REVENUE CREDIT

## 2021-04-12 PROCEDURE — 3331090003 HH PPS REVENUE ADJ

## 2021-04-14 ENCOUNTER — PATIENT OUTREACH (OUTPATIENT)
Dept: CASE MANAGEMENT | Age: 86
End: 2021-04-14

## 2021-04-14 NOTE — PROGRESS NOTES
Unable to reach (14 day follow up)    Attempted to reach the patient by telephone. Left message on voicemail. Episode will be resolved. No further follow up will be required at this time.